# Patient Record
Sex: FEMALE | Race: BLACK OR AFRICAN AMERICAN | Employment: OTHER | URBAN - METROPOLITAN AREA
[De-identification: names, ages, dates, MRNs, and addresses within clinical notes are randomized per-mention and may not be internally consistent; named-entity substitution may affect disease eponyms.]

---

## 2017-01-11 ENCOUNTER — HOSPITAL ENCOUNTER (OUTPATIENT)
Dept: LAB | Age: 67
Discharge: HOME | End: 2017-01-11

## 2017-01-11 DIAGNOSIS — E78.1 HYPERTRIGLYCERIDEMIA WITHOUT HYPERCHOLESTEROLEMIA: ICD-10-CM

## 2017-01-11 PROCEDURE — 99001 SPECIMEN HANDLING PT-LAB: CPT | Performed by: NURSE PRACTITIONER

## 2017-01-12 LAB
CHOLEST SERPL-MCNC: 168 MG/DL (ref 100–199)
HDLC SERPL-MCNC: 46 MG/DL
INTERPRETATION, 910389: NORMAL
LDLC SERPL CALC-MCNC: 94 MG/DL (ref 0–99)
TRIGL SERPL-MCNC: 141 MG/DL (ref 0–149)
VLDLC SERPL CALC-MCNC: 28 MG/DL (ref 5–40)

## 2017-01-17 ENCOUNTER — OFFICE VISIT (OUTPATIENT)
Dept: INTERNAL MEDICINE CLINIC | Age: 67
End: 2017-01-17

## 2017-01-17 VITALS
SYSTOLIC BLOOD PRESSURE: 140 MMHG | HEIGHT: 65 IN | BODY MASS INDEX: 32.02 KG/M2 | DIASTOLIC BLOOD PRESSURE: 73 MMHG | TEMPERATURE: 98.7 F | OXYGEN SATURATION: 96 % | RESPIRATION RATE: 18 BRPM | HEART RATE: 74 BPM | WEIGHT: 192.2 LBS

## 2017-01-17 DIAGNOSIS — F32.A ANXIETY AND DEPRESSION: ICD-10-CM

## 2017-01-17 DIAGNOSIS — Z23 ENCOUNTER FOR IMMUNIZATION: ICD-10-CM

## 2017-01-17 DIAGNOSIS — Z13.820 ENCOUNTER FOR SCREENING FOR OSTEOPOROSIS: ICD-10-CM

## 2017-01-17 DIAGNOSIS — F41.9 ANXIETY AND DEPRESSION: ICD-10-CM

## 2017-01-17 DIAGNOSIS — Z12.11 COLON CANCER SCREENING: ICD-10-CM

## 2017-01-17 DIAGNOSIS — E78.1 HYPERTRIGLYCERIDEMIA WITHOUT HYPERCHOLESTEROLEMIA: Primary | ICD-10-CM

## 2017-01-17 DIAGNOSIS — I10 ESSENTIAL HYPERTENSION WITH GOAL BLOOD PRESSURE LESS THAN 140/90: ICD-10-CM

## 2017-01-17 RX ORDER — ALPRAZOLAM 1 MG/1
1 TABLET ORAL
Qty: 45 TAB | Refills: 0 | Status: SHIPPED | OUTPATIENT
Start: 2017-01-17 | End: 2017-02-17 | Stop reason: SDUPTHER

## 2017-01-17 NOTE — PROGRESS NOTES
Pt is here for   Chief Complaint   Patient presents with    Cholesterol Problem     follow up. .. pt states she had the labs done last tuesday and would like to go over the results    Medication Refill     orders pending    Referral Request     pt states she's been going to [facility] on [facility] and they are closed      Pt denies pain at this time. ..     1. Have you been to the ER, urgent care clinic since your last visit? Hospitalized since your last visit? No    2. Have you seen or consulted any other health care providers outside of the [address] [place] [address] since your last visit? Include any pap smears or colon screening.  No

## 2017-01-17 NOTE — PROGRESS NOTES
Maria Isabel Meredith is a 73 y.o. female and presents with Cholesterol Problem (follow up. .. pt states she had the labs done last tuesday and would like to go over the results); Medication Refill (orders pending); and Referral Request (pt states she's been going to the [facility] on [facility] and they are closed )    Subjective:  Pt presents to f/u elevated triglycerides. Changed diet as suggested last OV, eating more salads and baked food. Avoiding eggs and fried chicken. Still taking meds daily as prescribed. Also needs med refill of Xanax, went to Dr. Leon Sevilla office twice last week, but close without notice. Usually takes BID with Zoloft, but trying to space out to make meds last. Noticed hand shakes since. Very frustrated and feeling down since provider left, needs new referral for MH. Hypertension Review:  The patient has hypertension  Diet and Lifestyle: generally does try to follow a  low sodium diet, exercises sporadically   Home BP Monitoring: is not measured at home. Pertinent ROS: taking medications as instructed, no medication side effects noted. No TIA's, chest pain on exertion, dyspnea on exertion, or swelling of ankles.    BP Readings from Last 3 Encounters:   01/17/17 140/73   11/15/16 126/71   10/18/16 136/79     Review of Systems  Constitutional: negative for fevers, chills, anorexia and weight loss  Respiratory:  negative for hemoptysis, dyspnea, and wheezing  CV:   negative for chest pain, palpitations, and lower extremity edema  GI:   negative for nausea, vomiting, diarrhea, abdominal pain, and melena  Endo:               negative for polyuria,polydipsia,polyphagia, and heat intolerance  Genitourinary: negative for frequency, urgency, dysuria, retention, and hematuria  Integument:  negative for rash, ulcerations, and pruritus  Hematologic:  negative for easy bruising and bleeding  Musculoskel: negative for arthralgias, muscle weakness,and joint pain/swelling  Neurological:  negative for headaches, dizziness, vertigo,and memory/gait problems  Behavl/Psych: negative for feelings of suicide, and mood changes    Past Medical History   Diagnosis Date    Anxiety     Arthritis     Hypertension     Psychiatric disorder      History reviewed. No pertinent past surgical history. Social History     Social History    Marital status:      Spouse name: N/A    Number of children: N/A    Years of education: N/A     Social History Main Topics    Smoking status: Current Every Day Smoker     Packs/day: 0.50    Smokeless tobacco: None      Comment: down to 1/2 ppd from 1 ppd    Alcohol use No    Drug use: No    Sexual activity: Not Asked     Other Topics Concern    None     Social History Narrative     Family History   Problem Relation Age of Onset    No Known Problems Mother     No Known Problems Father      Current Outpatient Prescriptions   Medication Sig Dispense Refill    ALPRAZolam (XANAX) 1 mg tablet Take 1 Tab by mouth two (2) times daily as needed for Anxiety. Max Daily Amount: 2 mg. Indications: ANXIETY WITH DEPRESSION 45 Tab 0    pneumococcal 13 kyra conj dip (PREVNAR-13) 0.5 mL syrg injection 0.5 mL by IntraMUSCular route once for 1 dose. 0.5 mL 0    icosapent ethyl (VASCEPA) 1 gram capsule Take 2 Caps by mouth two (2) times a day. 120 Cap 11    amLODIPine-atorvastatin (CADUET) 10-80 mg per tablet Take 1 Tab by mouth daily. 30 Tab 11    sertraline (ZOLOFT) 25 mg tablet Take 1 Tab by mouth daily. 30 Tab 11    ipratropium (ATROVENT) 0.06 % nasal spray 2 Sprays by Both Nostrils route four (4) times daily. 15 mL 0    naproxen (NAPROSYN) 500 mg tablet Take 500 mg by mouth two (2) times daily (with meals).  aspirin delayed-release 81 mg tablet Take 1 Tab by mouth daily. 30 Tab 11    traZODone (DESYREL) 100 mg tablet Take 150 mg by mouth nightly.        Allergies   Allergen Reactions    Citalopram Other (comments)     Not effective    Prozac [Fluoxetine] Anxiety Irritable, restless       Objective:  Visit Vitals    /73 (BP 1 Location: Right arm, BP Patient Position: Sitting)    Pulse 74    Temp 98.7 °F (37.1 °C) (Oral)    Resp 18    Ht 5' 5\" (1.651 m)    Wt 192 lb 3.2 oz (87.2 kg)    SpO2 96%    BMI 31.98 kg/m2     Physical Exam:   General appearance - alert, well appearing, and in no distress. Mental status - A/O x 4, normal mood and affect. Neck -Supple ,normal CSP. FROM, non-tender. No significant adenopathy/thyromegaly. No JVD. Chest - CTA. Symmetric chest rise. No wheezing, rales or rhonchi. Heart - Normal rate, regular rhythm. Normal S1, S2. No MGR or clicks. Abdomen - Soft,non-distended. Normoactive BS in all quadrants. NT, no mass or HSM. Ext- Radial, DP pulses, 2+ bilaterally. No pedal edema, clubbing, or cyanosis. Skin-Warm and dry. No hyperpigmentation, ulcerations, or suspicious lesions. Neuro - Normal speech, no focal findings or movement disorder. Normal strength, gait, and muscle tone. Assessment/Plan:  The current medical regimen is effective;  continue present plan and medications. Dietary choices reviewed although pt doing much better. FOBT, DXA scan, and PCV 13 ordered. See below for other orders   Follow-up Disposition:  Return in about 3 months (around 4/17/2017) for 3 month f/u. ICD-10-CM ICD-9-CM    1. Hypertriglyceridemia without hypercholesterolemia E78.1 272.1    2. Anxiety and depression F41.9 300.00 ALPRAZolam (XANAX) 1 mg tablet    F32.9 311 REFERRAL TO PSYCHIATRY   3. Encounter for immunization Z23 V03.89 pneumococcal 13 kyra conj dip (PREVNAR-13) 0.5 mL syrg injection   4. Colon cancer screening Z12.11 V76.51 OCCULT BLOOD, IMMUNOASSAY (FIT)   5. Encounter for screening for osteoporosis  Z13.820 V82.81 DEXA BONE DENSITY STUDY AXIAL   6.  Essential hypertension with goal blood pressure less than 140/90 I10 401.9      Orders Placed This Encounter    DEXA BONE DENSITY STUDY AXIAL     Standing Status:   Future Standing Expiration Date:   2018     Order Specific Question:   Reason for Exam     Answer:   osteopenia, osteoporosis screening.  OCCULT BLOOD, IMMUNOASSAY (FIT)    REFERRAL TO PSYCHIATRY     Referral Priority:   Routine     Referral Type:   Behavioral Health     Referral Reason:   Specialty Services Required     Referred to Provider:   MD Laura Soto 1 mg tablet     Sig: Take 1 Tab by mouth two (2) times daily as needed for Anxiety. Max Daily Amount: 2 mg. Indications: ANXIETY WITH DEPRESSION     Dispense:  45 Tab     Refill:  0    pneumococcal 13 kyra conj dip (PREVNAR-13) 0.5 mL syrg injection     Si.5 mL by IntraMUSCular route once for 1 dose. Dispense:  0.5 mL     Refill:  0       Arabella Lieberman expressed understanding of plan. An After Visit Summary was offered/printed and given to the patient.

## 2017-01-17 NOTE — PATIENT INSTRUCTIONS
Pneumococcal Polysaccharide Vaccine: Care Instructions  Your Care Instructions  The pneumococcal polysaccharide vaccine (PPSV) can prevent some of the serious complications of pneumonia. This includes infection in the bloodstream (bacteremia) or throughout the body (septicemia). PPSV is recommended for people ages 72 years and older. People ages 3 to 59 who have a long-term illness should also get the vaccine. This includes people with diabetes, heart disease, liver disease, or lung disease. PPSV can also help people who have a weakened immune system. This includes cancer patients and people who don't have a spleen. The immune system helps your body fight infection and other illnesses. PPSV is given as a shot. It's usually given in the arm. Healthy older adults get the shot once. Other people may need to have a second dose. The shot may cause pain and redness at the site. It may also cause a mild fever for a short time. Follow-up care is a key part of your treatment and safety. Be sure to make and go to all appointments, and call your doctor if you are having problems. It's also a good idea to know your test results and keep a list of the medicines you take. How can you care for yourself at home? · Take an over-the-counter pain medicine, such as acetaminophen (Tylenol), ibuprofen (Advil, Motrin), or naproxen (Aleve), if your arm is sore after the shot. Be safe with medicines. Read and follow all instructions on the label. · Give acetaminophen (Tylenol) or ibuprofen (Advil, Motrin) to your child for pain or fussiness after the shot. Read and follow all instructions on the label. Do not give aspirin to anyone younger than 20. It has been linked to Reye syndrome, a serious illness. · Put ice or a cold pack on the sore area for 10 to 20 minutes at a time. Put a thin cloth between the ice and your skin. When should you call for help? Call 911 anytime you think you may need emergency care.  For example, call if:  · You have severe problems breathing or swallowing. · You have a seizure. Call your doctor now or seek immediate medical care if:  · You get hives. · You have a high fever. · You have any unusual reaction after getting the shot. Watch closely for changes in your health, and be sure to contact your doctor if you have any problems. Where can you learn more? Go to http://shraddha-giles.info/. Enter T225 in the search box to learn more about \"Pneumococcal Polysaccharide Vaccine: Care Instructions. \"  Current as of: February 9, 2016  Content Version: 11.1  © 3709-4858 [employer]. Care instructions adapted under license by [employer] (which disclaims liability or warranty for this information). If you have questions about a medical condition or this instruction, always ask your healthcare professional. [address] any warranty or liability for your use of this information. Mediterranean Diet: Care Instructions  Your Care Instructions  The Mediterranean diet features foods eaten in [place], [place], [place] and [place], and other countries that border the [facility]. It emphasizes eating a diet rich in fruits, vegetables, nuts, and high-fiber grains, and limits meat, cheese, and sweets. The Mediterranean diet may:  · Prevent heart disease and lower the risk of a heart attack or stroke. · Prevent type 2 diabetes. · Prevent Alzheimer's disease and other dementia. · Prevent depression. · Prevent Parkinson's disease. This diet contains more fat than other heart-healthy diets. But the fats are mainly from nuts, unsaturated oils, such as fish oils, olive oil, and certain nut or seed oils (such as canola, soybean, or flaxseed oil). These types of oils may help protect the heart and blood vessels. Follow-up care is a key part of your treatment and safety.  Be sure to make and go to all appointments, and call your doctor if you are having problems. It's also a good idea to know your test results and keep a list of the medicines you take. How can you care for yourself at home? What to eat  · Eat a variety of fruits and vegetables each day, such as grapes, blueberries, tomatoes, broccoli, peppers, figs, olives, spinach, eggplant, beans, lentils, and chickpeas. · Eat a variety of whole-grain foods each day, such as oats, brown rice, and whole wheat bread, pasta, and couscous. · Eat fish at least 2 times a week. Try tuna, salmon, mackerel, lake trout, herring, or sardines. · Eat moderate amounts of low-fat dairy products each day or weekly, such as milk, cheese, or yogurt. · Eat moderate amounts of poultry and eggs every 2 days or weekly. · Choose healthy (unsaturated) fats, such as nuts, olive oil and certain nut or seed oils like canola, soybean, and flaxseed. · Limit unhealthy (saturated) fats, such as butter, palm oil, and coconut oil. And limit fats found in animal products, such as meat and dairy products made with whole milk. Try to eat red meat only a few times a month in very small amounts. · Limit sweets and desserts to only a few times a week. This includes sugar-sweetened drinks like soda. The Mediterranean diet may also include red wine with your meal--1 glass each day for women and up to 2 glasses a day for men. Tips for changing your diet  · Dip bread in a mix of olive oil and fresh herbs instead of using butter. · Add avocado slices to your sandwich instead of luna. · Have fish for lunch or dinner instead of red meat. Brush the fish with olive oil, and broil or grill it. · Sprinkle your salad with seeds or nuts instead of cheese. · Cook with olive or canola oil instead of butter or oils that are high in saturated fat. · Switch from 2% milk or whole milk to 1% or fat-free milk.   · Dip raw vegetables in a vinaigrette dressing or hummus instead of dips made from mayonnaise or sour cream.  · Have a piece of fruit for dessert instead of a piece of cake. Try baked apples, or have some dried fruit. Part of the Mediterranean diet is being active. Get at least 30 minutes of exercise on most days of the week. Walking is a good choice. You also may want to do other activities, such as running, swimming, cycling, or playing tennis or team sports. Where can you learn more? Go to http://shraddha-giles.info/. Enter O407 in the search box to learn more about \"Mediterranean Diet: Care Instructions. \"  Current as of: July 26, 2016  Content Version: 11.1  © 6439-7315 [employer]. Care instructions adapted under license by [employer] (which disclaims liability or warranty for this information). If you have questions about a medical condition or this instruction, always ask your healthcare professional. [address] any warranty or liability for your use of this information. Dual-Energy X-Ray Absorptiometry (DXA) Test: About This Test  What is it? Dual-energy X-ray absorptiometry (DXA) is a test that uses two different X-ray beams to check bone thickness (density) in your spine and hip. This information is used to estimate the strength of your bones. Why is this test done? A DXA test is done to check for bone thinning and weakness, which can make it easier for you to break a bone. It is often done for:  · People who are at risk for osteoporosis, including:  ¨ Women who are age 72 and older. ¨ Older men. ¨ People who take some medications, such as corticosteroids. ¨ People who have certain medical conditions, such as hyperparathyroidism. · People who have osteoporosis, to see how well treatment is working. What happens before the test?  · Women who are pregnant should not have this test. Let your doctor know if you are or might be pregnant.   · You may be able to leave your clothes on for the test, but you will remove any metal buttons or sushil for the test.  What happens during the test?  · You will lie down on your back on a padded table. · You may need to lie with your legs straight or with your lower legs resting on a platform built into the table. · The machine will scan your bones and measure the amount of radiation they absorb. During this test you are exposed to a very low dose of radiation. How long does the test take? · The test will take about 20 minutes. What happens after the test?  · You will probably be able to go home right away. · You can go back to your usual activities right away. Follow-up care is a key part of your treatment and safety. Be sure to make and go to all appointments, and call your doctor if you are having problems. It's also a good idea to keep a list of the medicines you take. Ask your doctor when you can expect to have your test results. Where can you learn more? Go to http://shraddha-giles.info/. Enter L817 in the search box to learn more about \"Dual-Energy X-Ray Absorptiometry (DXA) Test: About This Test.\"  Current as of: August 4, 2016  Content Version: 11.1  © 3069-0138 [employer]. Care instructions adapted under license by [employer] (which disclaims liability or warranty for this information). If you have questions about a medical condition or this instruction, always ask your healthcare professional. [address] any warranty or liability for your use of this information.

## 2017-01-17 NOTE — MR AVS SNAPSHOT
Visit Information Date & Time Provider Department Dept. Phone Encounter #  
 1/17/2017  9:00 AM Hang Piper NP [address] [facility] [phone] [id number] Follow-up Instructions Return in about 3 months (around 4/17/2017) for 3 month f/u. Upcoming Health Maintenance Date Due  
 BREAST CANCER SCRN MAMMOGRAM 6/1/2017 MEDICARE YEARLY EXAM 6/2/2017 GLAUCOMA SCREENING Q2Y 7/1/2017 Pneumococcal 65+ Low/Medium Risk (2 of 2 - PPSV23) 1/17/2018 FOBT Q 1 YEAR AGE 50-75 1/17/2018 DTaP/Tdap/Td series (2 - Td) 6/26/2025 Allergies as of 1/17/2017  Review Complete On: 1/17/2017 By: Jay Hartman. RJ Rosales Severity Noted Reaction Type Reactions Citalopram  04/20/2016   Intolerance Other (comments) Not effective Prozac [Fluoxetine]  04/20/2016   Intolerance Anxiety Irritable, restless Current Immunizations  Never Reviewed No immunizations on file. Not reviewed this visit You Were Diagnosed With   
  
 Codes Comments Hypertriglyceridemia without hypercholesterolemia    -  Primary ICD-10-CM: E78.1 ICD-9-CM: 272.1 Anxiety and depression     ICD-10-CM: F41.9, F32.9 ICD-9-CM: 300.00, 311 Encounter for immunization     ICD-10-CM: H50 ICD-9-CM: V03.89 Colon cancer screening     ICD-10-CM: Z12.11 ICD-9-CM: V76.51 Encounter for screening for osteoporosis     ICD-10-CM: Z13.820 ICD-9-CM: V82.81 Essential hypertension with goal blood pressure less than 140/90     ICD-10-CM: I10 
ICD-9-CM: 401.9 Vitals BP Pulse Temp Resp Height(growth percentile) Weight(growth percentile) 140/73 (BP 1 Location: Right arm, BP Patient Position: Sitting) 74 98.7 °F (37.1 °C) (Oral) 18 5' 5\" (1.651 m) 192 lb 3.2 oz (87.2 kg) SpO2 BMI OB Status Smoking Status 96% 31.98 kg/m2 Postmenopausal Current Every Day Smoker Vitals History BMI and BSA Data Body Mass Index Body Surface Area  31.98 kg/m 2 2 m 2  
  
 Preferred Pharmacy Pharmacy Name Phone [facility] [email or web address] [facility], [address] [phone] Your Updated Medication List  
  
   
This list is accurate as of: 17 10:01 AM.  Always use your most recent med list.  
  
  
  
  
 ALPRAZolam 1 mg tablet Commonly known as:  Melene Reels Take 1 Tab by mouth two (2) times daily as needed for Anxiety. Max Daily Amount: 2 mg. Indications: ANXIETY WITH DEPRESSION  
  
 amLODIPine-atorvastatin 10-80 mg per tablet Commonly known as:  CADUET Take 1 Tab by mouth daily. aspirin delayed-release 81 mg tablet Take 1 Tab by mouth daily. icosapent ethyl 1 gram capsule Commonly known as:  VASCEPA Take 2 Caps by mouth two (2) times a day. ipratropium 0.06 % nasal spray Commonly known as:  ATROVENT  
2 Sprays by Both Nostrils route four (4) times daily. naproxen 500 mg tablet Commonly known as:  NAPROSYN Take 500 mg by mouth two (2) times daily (with meals). pneumococcal 13 kyra conj dip 0.5 mL Syrg injection Commonly known as:  PREVNAR-13  
0.5 mL by IntraMUSCular route once for 1 dose. sertraline 25 mg tablet Commonly known as:  ZOLOFT Take 1 Tab by mouth daily. traZODone 100 mg tablet Commonly known as:  Ankur Merary Take 150 mg by mouth nightly. Prescriptions Printed Refills ALPRAZolam (XANAX) 1 mg tablet 0 Sig: Take 1 Tab by mouth two (2) times daily as needed for Anxiety. Max Daily Amount: 2 mg. Indications: ANXIETY WITH DEPRESSION Class: Print Route: Oral  
  
Prescriptions Sent to Pharmacy Refills  
 pneumococcal 13 kyra conj dip (PREVNAR-13) 0.5 mL syrg injection 0 Si.5 mL by IntraMUSCular route once for 1 dose. Class: Normal  
 Pharmacy: [email or web address] [place], [address] Ph #: [phone] Route: IntraMUSCular We Performed the Following OCCULT BLOOD, IMMUNOASSAY (FIT) [id number] CPT(R)] REFERRAL TO PSYCHIATRY [REF91 Custom] Comments:  
 Please evaluate patient for YULY and depression. Follow-up Instructions Return in about 3 months (around 4/17/2017) for 3 month f/u. To-Do List   
 01/17/2017 Imaging:  DEXA BONE DENSITY STUDY AXIAL Referral Information Referral ID Referred By Referred To  
  
 [id number] Siddhartha Mckeon MD   
   [address] [facility] [address], [address] Phone: [phone] Fax: [phone] Visits Status Start Date End Date 1 New Request 1/17/17 1/17/18 If your referral has a status of pending review or denied, additional information will be sent to support the outcome of this decision. Patient Instructions Pneumococcal Polysaccharide Vaccine: Care Instructions Your Care Instructions The pneumococcal polysaccharide vaccine (PPSV) can prevent some of the serious complications of pneumonia. This includes infection in the bloodstream (bacteremia) or throughout the body (septicemia). PPSV is recommended for people ages 72 years and older. People ages 3 to 59 who have a long-term illness should also get the vaccine. This includes people with diabetes, heart disease, liver disease, or lung disease. PPSV can also help people who have a weakened immune system. This includes cancer patients and people who don't have a spleen. The immune system helps your body fight infection and other illnesses. PPSV is given as a shot. It's usually given in the arm. Healthy older adults get the shot once. Other people may need to have a second dose. The shot may cause pain and redness at the site. It may also cause a mild fever for a short time. Follow-up care is a key part of your treatment and safety. Be sure to make and go to all appointments, and call your doctor if you are having problems. It's also a good idea to know your test results and keep a list of the medicines you take. How can you care for yourself at home? · Take an over-the-counter pain medicine, such as acetaminophen (Tylenol), ibuprofen (Advil, Motrin), or naproxen (Aleve), if your arm is sore after the shot. Be safe with medicines. Read and follow all instructions on the label. · Give acetaminophen (Tylenol) or ibuprofen (Advil, Motrin) to your child for pain or fussiness after the shot. Read and follow all instructions on the label. Do not give aspirin to anyone younger than 20. It has been linked to Reye syndrome, a serious illness. · Put ice or a cold pack on the sore area for 10 to 20 minutes at a time. Put a thin cloth between the ice and your skin. When should you call for help? Call 911 anytime you think you may need emergency care. For example, call if: 
· You have severe problems breathing or swallowing. · You have a seizure. Call your doctor now or seek immediate medical care if: 
· You get hives. · You have a high fever. · You have any unusual reaction after getting the shot. Watch closely for changes in your health, and be sure to contact your doctor if you have any problems. Where can you learn more? Go to http://shraddha-giles.info/. Enter T225 in the search box to learn more about \"Pneumococcal Polysaccharide Vaccine: Care Instructions. \" Current as of: February 9, 2016 Content Version: 11.1 © 0949-1133 [employer]Senas[employer]. Care instructions adapted under license by [employer] (which disclaims liability or warranty for this information). If you have questions about a medical condition or this instruction, always ask your healthcare professional. [place] [address] any warranty or liability for your use of this information. Mediterranean Diet: Care Instructions Your Care Instructions The Mediterranean diet features foods eaten in [place], [place], [place] and [place], and other countries that border the [facility]. It emphasizes eating a diet rich in fruits, vegetables, nuts, and high-fiber grains, and limits meat, cheese, and sweets. The Mediterranean diet may: · Prevent heart disease and lower the risk of a heart attack or stroke. · Prevent type 2 diabetes. · Prevent Alzheimer's disease and other dementia. · Prevent depression. · Prevent Parkinson's disease. This diet contains more fat than other heart-healthy diets. But the fats are mainly from nuts, unsaturated oils, such as fish oils, olive oil, and certain nut or seed oils (such as canola, soybean, or flaxseed oil). These types of oils may help protect the heart and blood vessels. Follow-up care is a key part of your treatment and safety. Be sure to make and go to all appointments, and call your doctor if you are having problems. It's also a good idea to know your test results and keep a list of the medicines you take. How can you care for yourself at home? What to eat · Eat a variety of fruits and vegetables each day, such as grapes, blueberries, tomatoes, broccoli, peppers, figs, olives, spinach, eggplant, beans, lentils, and chickpeas. · Eat a variety of whole-grain foods each day, such as oats, brown rice, and whole wheat bread, pasta, and couscous. · Eat fish at least 2 times a week. Try tuna, salmon, mackerel, lake trout, herring, or sardines. · Eat moderate amounts of low-fat dairy products each day or weekly, such as milk, cheese, or yogurt. · Eat moderate amounts of poultry and eggs every 2 days or weekly. · Choose healthy (unsaturated) fats, such as nuts, olive oil and certain nut or seed oils like canola, soybean, and flaxseed. · Limit unhealthy (saturated) fats, such as butter, palm oil, and coconut oil. And limit fats found in animal products, such as meat and dairy products made with whole milk. Try to eat red meat only a few times a month in very small amounts. · Limit sweets and desserts to only a few times a week.  This includes sugar-sweetened drinks like soda. The Mediterranean diet may also include red wine with your meal1 glass each day for women and up to 2 glasses a day for men. Tips for changing your diet · Dip bread in a mix of olive oil and fresh herbs instead of using butter. · Add avocado slices to your sandwich instead of luna. · Have fish for lunch or dinner instead of red meat. Brush the fish with olive oil, and broil or grill it. · Sprinkle your salad with seeds or nuts instead of cheese. · Cook with olive or canola oil instead of butter or oils that are high in saturated fat. · Switch from 2% milk or whole milk to 1% or fat-free milk. · Dip raw vegetables in a vinaigrette dressing or hummus instead of dips made from mayonnaise or sour cream. 
· Have a piece of fruit for dessert instead of a piece of cake. Try baked apples, or have some dried fruit. Part of the Mediterranean diet is being active. Get at least 30 minutes of exercise on most days of the week. Walking is a good choice. You also may want to do other activities, such as running, swimming, cycling, or playing tennis or team sports. Where can you learn more? Go to http://shraddha-giles.info/. Enter O407 in the search box to learn more about \"Mediterranean Diet: Care Instructions. \" Current as of: July 26, 2016 Content Version: 11.1 © 2584-9895 Whotever. Care instructions adapted under license by [employer] (which disclaims liability or warranty for this information). If you have questions about a medical condition or this instruction, always ask your healthcare professional. [place] [address] any warranty or liability for your use of this information. Dual-Energy X-Ray Absorptiometry (DXA) Test: About This Test 
What is it? Dual-energy X-ray absorptiometry (DXA) is a test that uses two different X-ray beams to check bone thickness (density) in your spine and hip.  This information is used to estimate the strength of your bones. Why is this test done? A DXA test is done to check for bone thinning and weakness, which can make it easier for you to break a bone. It is often done for: 
· People who are at risk for osteoporosis, including: ¨ Women who are age 72 and older. ¨ Older men. ¨ People who take some medications, such as corticosteroids. ¨ People who have certain medical conditions, such as hyperparathyroidism. · People who have osteoporosis, to see how well treatment is working. What happens before the test? 
· Women who are pregnant should not have this test. Let your doctor know if you are or might be pregnant. · You may be able to leave your clothes on for the test, but you will remove any metal buttons or sushil for the test. 
What happens during the test? 
· You will lie down on your back on a padded table. · You may need to lie with your legs straight or with your lower legs resting on a platform built into the table. · The machine will scan your bones and measure the amount of radiation they absorb. During this test you are exposed to a very low dose of radiation. How long does the test take? · The test will take about 20 minutes. What happens after the test? 
· You will probably be able to go home right away. · You can go back to your usual activities right away. Follow-up care is a key part of your treatment and safety. Be sure to make and go to all appointments, and call your doctor if you are having problems. It's also a good idea to keep a list of the medicines you take. Ask your doctor when you can expect to have your test results. Where can you learn more? Go to http://shraddha-giles.info/. Enter L643 in the search box to learn more about \"Dual-Energy X-Ray Absorptiometry (DXA) Test: About This Test.\" Current as of: August 4, 2016 Content Version: 11.1 © 2606-0348 [employer].  Care instructions adapted under license by [address] (which disclaims liability or warranty for this information). If you have questions about a medical condition or this instruction, always ask your healthcare professional. [address] any warranty or liability for your use of this information. Introducing \[facility] [place] [facility]\"" & HEALTH SERVICES! Fariha Anderson introduces Medialets patient portal. Now you can access parts of your medical record, email your doctor's office, and request medication refills online. 1. In your internet browser, go to [email or web address] 2. Click on the First Time User? Click Here link in the Sign In box. You will see the New Member Sign Up page. 3. Enter your Medialets Access Code exactly as it appears below. You will not need to use this code after youve completed the sign-up process. If you do not sign up before the expiration date, you must request a new code. · Medialets Access Code: [email or web address] Expires: 2/13/2017 10:59 AM 
 
4. Enter the last four digits of your Social Security Number (xxxx) and Date of Birth (mm/dd/yyyy) as indicated and click Submit. You will be taken to the next sign-up page. 5. Create a Medialets ID. This will be your Medialets login ID and cannot be changed, so think of one that is secure and easy to remember. 6. Create a Medialets password. You can change your password at any time. 7. Enter your Password Reset Question and Answer. This can be used at a later time if you forget your password. 8. Enter your e-mail address. You will receive e-mail notification when new information is available in [address] 9. Click Sign Up. You can now view and download portions of your medical record. 10. Click the Download Summary menu link to download a portable copy of your medical information. If you have questions, please visit the Frequently Asked Questions section of the Medialets website.  Remember, Medialets is NOT to be used for urgent needs. For medical emergencies, dial 911. Now available from your iPhone and Android! Please provide this summary of care documentation to your next provider. Your primary care clinician is listed as CHRISTEN Portillo. If you have any questions after today's visit, please call [phone].

## 2017-02-08 ENCOUNTER — TELEPHONE (OUTPATIENT)
Dept: INTERNAL MEDICINE CLINIC | Age: 67
End: 2017-02-08

## 2017-02-08 NOTE — TELEPHONE ENCOUNTER
PT STATES  She is scheduled for bone density test  2/15/17 @ 10:30 a m  @ [facility]. She has [employer] ins.  Pt #  [phone]

## 2017-02-14 ENCOUNTER — TELEPHONE (OUTPATIENT)
Dept: INTERNAL MEDICINE CLINIC | Age: 67
End: 2017-02-14

## 2017-02-14 NOTE — TELEPHONE ENCOUNTER
Ashley Macias @ [employer] states the dx code for the bone density is not passing with medicare. She could not give me  another code. You have to put another one in.  The pt is scheduled for tomorrow @ 19:89XT. Rosalee's # [phone]

## 2017-02-15 ENCOUNTER — TELEPHONE (OUTPATIENT)
Dept: INTERNAL MEDICINE CLINIC | Age: 67
End: 2017-02-15

## 2017-02-15 DIAGNOSIS — Z91.89 AT RISK FOR OSTEOPOROSIS/OSTEOPENIA: Primary | ICD-10-CM

## 2017-02-15 NOTE — TELEPHONE ENCOUNTER
Gianfranco See states you have to put another order in   with the selected dx code please so she can reschedule the dexa scan

## 2017-02-15 NOTE — TELEPHONE ENCOUNTER
They can try Z91.89, at risk for osteopenia/osteoporosis instead. Just as an FYI, jus, via the ABN tab MADE me select an alternate diagnosis and the one chosen was from the list of APPROVED diagnosis for DEXA scan.

## 2017-02-17 ENCOUNTER — OFFICE VISIT (OUTPATIENT)
Dept: INTERNAL MEDICINE CLINIC | Age: 67
End: 2017-02-17

## 2017-02-17 VITALS
RESPIRATION RATE: 18 BRPM | BODY MASS INDEX: 31.16 KG/M2 | DIASTOLIC BLOOD PRESSURE: 65 MMHG | HEIGHT: 65 IN | OXYGEN SATURATION: 97 % | TEMPERATURE: 98.3 F | WEIGHT: 187 LBS | SYSTOLIC BLOOD PRESSURE: 130 MMHG | HEART RATE: 74 BPM

## 2017-02-17 DIAGNOSIS — E78.1 HYPERTRIGLYCERIDEMIA WITHOUT HYPERCHOLESTEROLEMIA: ICD-10-CM

## 2017-02-17 DIAGNOSIS — F32.A ANXIETY AND DEPRESSION: Primary | ICD-10-CM

## 2017-02-17 DIAGNOSIS — F41.9 ANXIETY AND DEPRESSION: Primary | ICD-10-CM

## 2017-02-17 LAB
CHOLEST SERPL-MCNC: 183 MG/DL
HDLC SERPL-MCNC: 60 MG/DL
LDL CHOLESTEROL POC: NORMAL
NON-HDL GOAL (POC): 123
TCHOL/HDL RATIO (POC): 3.1
TRIGL SERPL-MCNC: 540 MG/DL

## 2017-02-17 RX ORDER — ALPRAZOLAM 1 MG/1
1 TABLET ORAL
Qty: 45 TAB | Refills: 1 | Status: SHIPPED | OUTPATIENT
Start: 2017-02-17 | End: 2017-05-15 | Stop reason: SDUPTHER

## 2017-02-17 NOTE — PROGRESS NOTES
Lu Dao is a 78 y.o. female and presents with Medication Refill (orders pending)    Subjective:  Patient is seen for followup of anxiety and depression. Treatment includes Zoloft, Trazodone, and Xanax and no other therapies. Ongoing symptoms include depressed mood, insomnia, psychomotor agitation, hand shaking, fatigue, difficulty concentrating and hopelessness. Concerned for 's condition with Alzheimer's, recently became aggressive, seen at [facility] psych x 6-7 days. Nervous now since unsure if he will become aggressive, wishing to harm anyone again. The patient denies recurrent thoughts of death and suicidal thoughts without plan. The patient experiences the following side effects from the treatment: none. PHQ 2 / 9, over the last two weeks 2/17/2017   Little interest or pleasure in doing things Not at all   Feeling down, depressed or hopeless Several days   Total Score PHQ 2 1      Review of Systems  Constitutional: negative for fevers, chills, anorexia and weight loss  Respiratory:  negative for hemoptysis, dyspnea, and wheezing  CV:   + lipids. negative for chest pain, palpitations, and lower extremity edema  GI:   negative for nausea, vomiting, diarrhea, abdominal pain, and melena  Endo:               negative for polyuria,polydipsia,polyphagia, and heat intolerance  Genitourinary: negative for frequency, urgency, dysuria, retention, and hematuria  Integument:  negative for rash, ulcerations, and pruritus  Hematologic:  negative for easy bruising and bleeding  Musculoskel: negative for arthralgias, muscle weakness,and joint pain/swelling  Neurological:  negative for headaches, dizziness, vertigo,and memory/gait problems  Behavl/Psych: negative for feelings of suicide, and mood changes    Past Medical History   Diagnosis Date    Anxiety     Arthritis     Hypertension     Psychiatric disorder      History reviewed. No pertinent past surgical history.   Social History     Social History    Marital status:      Spouse name: N/A    Number of children: N/A    Years of education: N/A     Social History Main Topics    Smoking status: Current Every Day Smoker     Packs/day: 0.50    Smokeless tobacco: None      Comment: down to 1/2 ppd from 1 ppd    Alcohol use No    Drug use: No    Sexual activity: Not Asked     Other Topics Concern    None     Social History Narrative     Family History   Problem Relation Age of Onset    No Known Problems Mother     No Known Problems Father      Current Outpatient Prescriptions   Medication Sig Dispense Refill    ALPRAZolam (XANAX) 1 mg tablet Take 1 Tab by mouth two (2) times daily as needed for Anxiety. Max Daily Amount: 2 mg. Indications: ANXIETY WITH DEPRESSION 45 Tab 1    icosapent ethyl (VASCEPA) 1 gram capsule Take 2 Caps by mouth two (2) times a day. 120 Cap 11    amLODIPine-atorvastatin (CADUET) 10-80 mg per tablet Take 1 Tab by mouth daily. 30 Tab 11    sertraline (ZOLOFT) 25 mg tablet Take 1 Tab by mouth daily. 30 Tab 11    ipratropium (ATROVENT) 0.06 % nasal spray 2 Sprays by Both Nostrils route four (4) times daily. 15 mL 0    naproxen (NAPROSYN) 500 mg tablet Take 500 mg by mouth two (2) times daily (with meals).  aspirin delayed-release 81 mg tablet Take 1 Tab by mouth daily. 30 Tab 11    traZODone (DESYREL) 100 mg tablet Take 150 mg by mouth nightly. Allergies   Allergen Reactions    Citalopram Other (comments)     Not effective    Prozac [Fluoxetine] Anxiety     Irritable, restless       Objective:  Visit Vitals    /65 (BP 1 Location: Right arm, BP Patient Position: Sitting)    Pulse 74    Temp 98.3 °F (36.8 °C) (Oral)    Resp 18    Ht 5' 5\" (1.651 m)    Wt 187 lb (84.8 kg)    SpO2 97%    BMI 31.12 kg/m2     Physical Exam:   General appearance - alert, well appearing, and in no distress. Mental status - A/O x 4, normal mood and affect. Neck -Supple ,normal CSP. FROM, non-tender.  No significant adenopathy/thyromegaly. No JVD. Chest - CTA. Symmetric chest rise. No wheezing, rales or rhonchi. Heart - Normal rate, regular rhythm. Normal S1, S2. No MGR or clicks. Abdomen - Soft,non-distended. Normoactive BS in all quadrants. NT, no mass or HSM. Ext- Radial, DP pulses, 2+ bilaterally. No pedal edema, clubbing, or cyanosis. Skin-Warm and dry. No hyperpigmentation, ulcerations, or suspicious lesions. Neuro - Normal speech, no focal findings or movement disorder. Normal strength, gait, and muscle tone. Assessment/Plan:  Anxiety- Refilled Xanax (#45 tabs).  was reviewed while planning for pain management, no indications of drug diversion suspected. Prescription history is not suspicious for controlled substance overuse. See below for other orders   Follow-up Disposition:  Return for keep appt as scheduled for lipids. ICD-10-CM ICD-9-CM    1. Anxiety and depression F41.9 300.00 ALPRAZolam (XANAX) 1 mg tablet    F32.9 311    2. Hypertriglyceridemia without hypercholesterolemia E78.1 272.1 AMB POC LIPID PROFILE     Orders Placed This Encounter    AMB POC LIPID PROFILE    ALPRAZolam (XANAX) 1 mg tablet     Sig: Take 1 Tab by mouth two (2) times daily as needed for Anxiety. Max Daily Amount: 2 mg. Indications: ANXIETY WITH DEPRESSION     Dispense:  39 Tab     Refill:  1       Adithya Leung expressed understanding of plan. An After Visit Summary was offered/printed and given to the patient.

## 2017-02-17 NOTE — PROGRESS NOTES
1. Have you been to the ER, urgent care clinic since your last visit? Hospitalized since your last visit? No    2. Have you seen or consulted any other health care providers outside of the [address] [place] [address] since your last visit? Include any pap smears or colon screening. No     Pt is here for   Chief Complaint   Patient presents with    Medication Refill     orders pending     Pt denies pain at this time. ...

## 2017-02-17 NOTE — MR AVS SNAPSHOT
Visit Information Date & Time Provider Department Dept. Phone Encounter #  
 2/17/2017 10:40 AM Angelo Hermosillo NP [address] [facility] [phone] [id number] Follow-up Instructions Return for keep appt as scheduled for lipids. Your Appointments 4/10/2017  9:30 AM  
New Patient with Kurt Carrera MD  
[facility] Appt Note: new pt for anxiety and depression referred by Angelo Hermosillo NP  
 [address] [facility] [address] [facility] [address]  
  
   
 [address] [facility] [address] [address] 4/17/2017  9:00 AM  
ROUTINE CARE with Angelo Hermosillo NP  
[address] [facility]) Appt Note: 3 month fu  
 [address] [address]  
[phone]  
  
   
 [address] [facility] Upcoming Health Maintenance Date Due  
 BREAST CANCER SCRN MAMMOGRAM 6/1/2017 MEDICARE YEARLY EXAM 6/2/2017 GLAUCOMA SCREENING Q2Y 7/1/2017 Pneumococcal 65+ Low/Medium Risk (2 of 2 - PPSV23) 1/17/2018 FOBT Q 1 YEAR AGE 50-75 1/17/2018 DTaP/Tdap/Td series (2 - Td) 6/26/2025 Allergies as of 2/17/2017  Review Complete On: 2/17/2017 By: Angelo Hermosillo NP Severity Noted Reaction Type Reactions Citalopram  04/20/2016   Intolerance Other (comments) Not effective Prozac [Fluoxetine]  04/20/2016   Intolerance Anxiety Irritable, restless Current Immunizations  Never Reviewed No immunizations on file. Not reviewed this visit You Were Diagnosed With   
  
 Codes Comments Anxiety and depression    -  Primary ICD-10-CM: F41.9, F32.9 ICD-9-CM: 300.00, 311 Hypertriglyceridemia without hypercholesterolemia     ICD-10-CM: E78.1 ICD-9-CM: 272.1 Vitals BP Pulse Temp Resp Height(growth percentile) Weight(growth percentile)  130/65 (BP 1 Location: Right arm, BP Patient Position: Sitting) 74 98.3 °F (36.8 °C) (Oral) 18 5' 5\" (1.651 m) 187 lb (84.8 kg) SpO2 BMI OB Status Smoking Status 97% 31.12 kg/m2 Postmenopausal Current Every Day Smoker Vitals History BMI and BSA Data Body Mass Index Body Surface Area  
 31.12 kg/m 2 1.97 m 2 Preferred Pharmacy Pharmacy Name Phone  [facility] [address] [address] [facility] [phone] Your Updated Medication List  
  
   
This list is accurate as of: 2/17/17 11:39 AM.  Always use your most recent med list.  
  
  
  
  
 ALPRAZolam 1 mg tablet Commonly known as:  Melene Reels Take 1 Tab by mouth two (2) times daily as needed for Anxiety. Max Daily Amount: 2 mg. Indications: ANXIETY WITH DEPRESSION  
  
 amLODIPine-atorvastatin 10-80 mg per tablet Commonly known as:  CADUET Take 1 Tab by mouth daily. aspirin delayed-release 81 mg tablet Take 1 Tab by mouth daily. icosapent ethyl 1 gram capsule Commonly known as:  VASCEPA Take 2 Caps by mouth two (2) times a day. ipratropium 0.06 % nasal spray Commonly known as:  ATROVENT  
2 Sprays by Both Nostrils route four (4) times daily. naproxen 500 mg tablet Commonly known as:  NAPROSYN Take 500 mg by mouth two (2) times daily (with meals). sertraline 25 mg tablet Commonly known as:  ZOLOFT Take 1 Tab by mouth daily. traZODone 100 mg tablet Commonly known as:  Ankur Merary Take 150 mg by mouth nightly. Prescriptions Printed Refills ALPRAZolam (XANAX) 1 mg tablet 1 Sig: Take 1 Tab by mouth two (2) times daily as needed for Anxiety. Max Daily Amount: 2 mg. Indications: ANXIETY WITH DEPRESSION Class: Print Route: Oral  
  
We Performed the Following AMB POC LIPID PROFILE [77713 CPT(R)] Follow-up Instructions Return for keep appt as scheduled for lipids. Patient Instructions Anxiety Disorder: Care Instructions Your Care Instructions Anxiety is a normal reaction to stress. Difficult situations can cause you to have symptoms such as sweaty palms and a nervous feeling. In an anxiety disorder, the symptoms are far more severe. Constant worry, muscle tension, trouble sleeping, nausea and diarrhea, and other symptoms can make normal daily activities difficult or impossible. These symptoms may occur for no reason, and they can affect your work, school, or social life. Medicines, counseling, and self-care can all help. Follow-up care is a key part of your treatment and safety. Be sure to make and go to all appointments, and call your doctor if you are having problems. It's also a good idea to know your test results and keep a list of the medicines you take. How can you care for yourself at home? · Take medicines exactly as directed. Call your doctor if you think you are having a problem with your medicine. · Go to your counseling sessions and follow-up appointments. · Recognize and accept your anxiety. Then, when you are in a situation that makes you anxious, say to yourself, \"This is not an emergency. I feel uncomfortable, but I am not in danger. I can keep going even if I feel anxious. \" · Be kind to your body: ¨ Relieve tension with exercise or a massage. ¨ Get enough rest. 
¨ Avoid alcohol, caffeine, nicotine, and illegal drugs. They can increase your anxiety level and cause sleep problems. ¨ Learn and do relaxation techniques. See below for more about these techniques. · Engage your mind. Get out and do something you enjoy. Go to a funny movie, or take a walk or hike. Plan your day. Having too much or too little to do can make you anxious. · Keep a record of your symptoms. Discuss your fears with a good friend or family member, or join a support group for people with similar problems. Talking to others sometimes relieves stress. · Get involved in social groups, or volunteer to help others.  Being alone sometimes makes things seem worse than they are. · Get at least 30 minutes of exercise on most days of the week to relieve stress. Walking is a good choice. You also may want to do other activities, such as running, swimming, cycling, or playing tennis or team sports. Relaxation techniques Do relaxation exercises 10 to 20 minutes a day. You can play soothing, relaxing music while you do them, if you wish. · Tell others in your house that you are going to do your relaxation exercises. Ask them not to disturb you. · Find a comfortable place, away from all distractions and noise. · Lie down on your back, or sit with your back straight. · Focus on your breathing. Make it slow and steady. · Breathe in through your nose. Breathe out through either your nose or mouth. · Breathe deeply, filling up the area between your navel and your rib cage. Breathe so that your belly goes up and down. · Do not hold your breath. · Breathe like this for 5 to 10 minutes. Notice the feeling of calmness throughout your whole body. As you continue to breathe slowly and deeply, relax by doing the following for another 5 to 10 minutes: · Tighten and relax each muscle group in your body. You can begin at your toes and work your way up to your head. · Imagine your muscle groups relaxing and becoming heavy. · Empty your mind of all thoughts. · Let yourself relax more and more deeply. · Become aware of the state of calmness that surrounds you. · When your relaxation time is over, you can bring yourself back to alertness by moving your fingers and toes and then your hands and feet and then stretching and moving your entire body. Sometimes people fall asleep during relaxation, but they usually wake up shortly afterward. · Always give yourself time to return to full alertness before you drive a car or do anything that might cause an accident if you are not fully alert. Never play a relaxation tape while you drive a car. When should you call for help? Call 911 anytime you think you may need emergency care. For example, call if: 
· You feel you cannot stop from hurting yourself or someone else. Keep the numbers for these national suicide hotlines: 9-978-416-TALK (5-703.697.2537) and 9-575-FCLABQY (2-624.501.6188). If you or someone you know talks about suicide or feeling hopeless, get help right away. Watch closely for changes in your health, and be sure to contact your doctor if: 
· You have anxiety or fear that affects your life. · You have symptoms of anxiety that are new or different from those you had before. Where can you learn more? Go to http://shraddha-giles.info/. Enter P754 in the search box to learn more about \"Anxiety Disorder: Care Instructions. \" Current as of: July 26, 2016 Content Version: 11.1 © 0277-5500 [employer]. Care instructions adapted under license by [employer] (which disclaims liability or warranty for this information). If you have questions about a medical condition or this instruction, always ask your healthcare professional. [address] any warranty or liability for your use of this information. Introducing [facility]! Rubén Mccloud introduces makerSQR patient portal. Now you can access parts of your medical record, email your doctor's office, and request medication refills online. 1. In your internet browser, go to [email or web address] 2. Click on the First Time User? Click Here link in the Sign In box. You will see the New Member Sign Up page. 3. Enter your makerSQR Access Code exactly as it appears below. You will not need to use this code after youve completed the sign-up process. If you do not sign up before the expiration date, you must request a new code. · makerSQR Access Code: [email or web address] Expires: 5/15/2017 11:16 AM 
 
 4. Enter the last four digits of your Social Security Number (xxxx) and Date of Birth (mm/dd/yyyy) as indicated and click Submit. You will be taken to the next sign-up page. 5. Create a Algramo ID. This will be your Algramo login ID and cannot be changed, so think of one that is secure and easy to remember. 6. Create a Algramo password. You can change your password at any time. 7. Enter your Password Reset Question and Answer. This can be used at a later time if you forget your password. 8. Enter your e-mail address. You will receive e-mail notification when new information is available in [address] 9. Click Sign Up. You can now view and download portions of your medical record. 10. Click the Download Summary menu link to download a portable copy of your medical information. If you have questions, please visit the Frequently Asked Questions section of the Algramo website. Remember, Algramo is NOT to be used for urgent needs. For medical emergencies, dial 911. Now available from your iPhone and Android! Please provide this summary of care documentation to your next provider. Your primary care clinician is listed as CHRISTEN Samson. If you have any questions after today's visit, please call [phone].

## 2017-02-17 NOTE — PATIENT INSTRUCTIONS

## 2017-03-02 DIAGNOSIS — Z12.39 SCREENING FOR MALIGNANT NEOPLASM OF BREAST: Primary | ICD-10-CM

## 2017-04-10 ENCOUNTER — OFFICE VISIT (OUTPATIENT)
Dept: BEHAVIORAL/MENTAL HEALTH CLINIC | Age: 67
End: 2017-04-10

## 2017-04-10 VITALS
HEIGHT: 65 IN | WEIGHT: 190 LBS | OXYGEN SATURATION: 98 % | HEART RATE: 71 BPM | DIASTOLIC BLOOD PRESSURE: 84 MMHG | SYSTOLIC BLOOD PRESSURE: 138 MMHG | BODY MASS INDEX: 31.65 KG/M2

## 2017-04-10 DIAGNOSIS — F41.8 DEPRESSION WITH ANXIETY: Primary | ICD-10-CM

## 2017-04-10 RX ORDER — BUPROPION HYDROCHLORIDE 150 MG/1
150 TABLET ORAL
Qty: 30 TAB | Refills: 1 | Status: SHIPPED | OUTPATIENT
Start: 2017-04-10 | End: 2017-05-15 | Stop reason: SDUPTHER

## 2017-04-11 NOTE — PROGRESS NOTES
Ambulatory Initial Psychiatric Evaluation     Chief Complaint:   Chief Complaint   Patient presents with    New Patient     New pt for anxiety and depression, Referred by NP Kiko Miranda        History of Present Illness: Bob Méndez is a 78 y.o. , AA female who presents with long h/o MDD, Anxiety, remote h/o THC use and multiple medical problems was referrec by her primary care NP to establish her [place] [address] care here as her last psychiatrist, Dr. Pop Milton retired recently. Pt is currently stable on trazodone and Xanax for about a year. Her NP gave her Zoloft recently which she did not tolerate and is not taking it. Pt reports that she got into [place] [address] treatment long time ago with Dr. Sidra Franco, whom she saw for years here in [place]. After him she saw Dr. Amauri Denny for years and after his halfway few years ago she saw Dr. Pop Milton. In the past she was on lorazepam for years which was changed to Xanax by Dr. Amauri Denny and has worked well. She does not recall any other psych medications. She has never been hospitalized for MH reasons. Denies any long term illicit drugs or alcohol use d/o. Pt's current stressors include 's dementia related aggressive behaviors towards her. He has been suffering from dementia for last few years and also has multiple medical problems. She has some support from her cousin and grand son in providing 24/7 care for him. Pt reports she stays depressed, anxious and tired all the time. Her smoking has gone up. Sleep is OK. Eating fairly well. Denies any SI or HI. No psychosis or arlene. Denies any nightmares or flashbacks. Denies any obsession or compulsions. She is obese ( BMI=32) and has stable BP. MOCA: 28/30  GDS:5/15     Past Psychiatric History:     Previous psychiatric care: admits  As per HPI    Previous suicide attempts: none    Previous Hospitalizations: denies  none    Previous psychiatric medications/ECT/TMS: admits  As per HPI.  No ECT or TMS          History of rehab, detox, withdrawal: none    Social History:   Social History     Social History    Marital status:      Spouse name: N/A    Number of children: N/A    Years of education: N/A     Social History Main Topics    Smoking status: Current Every Day Smoker     Packs/day: 0.50    Smokeless tobacco: None      Comment: down to 1/2 ppd from 1 ppd    Alcohol use No    Drug use: No    Sexual activity: Not Asked     Other Topics Concern    None     Social History Narrative        Ethnic:   Relationship Status:  x 51 years. Has 2 adult children  Living Situation: With spouse   Employment: retired  Tobacco/Caffeine/Alchol use: smokes 1/2 ppd  Illicit Drug Social History:  Remote h/o THC use  Hobbies:  TV  Sexual:  not sexually active    Family History:   Family History   Problem Relation Age of Onset    No Known Problems Mother     No Known Problems Father         Past Medical History:   Past Medical History:   Diagnosis Date    Anxiety     Arthritis     Hypertension     Psychiatric disorder          Allergies: Allergies   Allergen Reactions    Citalopram Other (comments)     Not effective    Prozac [Fluoxetine] Anxiety     Irritable, restless        Medication List:   Current Outpatient Prescriptions   Medication Sig Dispense Refill    buPROPion XL (WELLBUTRIN XL) 150 mg tablet Take 1 Tab by mouth daily (after breakfast). Indications: ANXIETY WITH DEPRESSION 30 Tab 1    ALPRAZolam (XANAX) 1 mg tablet Take 1 Tab by mouth two (2) times daily as needed for Anxiety. Max Daily Amount: 2 mg. Indications: ANXIETY WITH DEPRESSION 45 Tab 1    icosapent ethyl (VASCEPA) 1 gram capsule Take 2 Caps by mouth two (2) times a day. 120 Cap 11    amLODIPine-atorvastatin (CADUET) 10-80 mg per tablet Take 1 Tab by mouth daily. 30 Tab 11    ipratropium (ATROVENT) 0.06 % nasal spray 2 Sprays by Both Nostrils route four (4) times daily.  15 mL 0    naproxen (NAPROSYN) 500 mg tablet Take 500 mg by mouth two (2) times daily (with meals).  aspirin delayed-release 81 mg tablet Take 1 Tab by mouth daily. 30 Tab 11    traZODone (DESYREL) 100 mg tablet Take 150 mg by mouth nightly.  sertraline (ZOLOFT) 25 mg tablet Take 1 Tab by mouth daily. 30 Tab 11      ROS:  Constitutional: positive for obese  Respiratory: negative for sputum or wheezing  Cardiovascular: negative for chest pain, palpitations  Gastrointestinal: negative for reflux symptoms and constipation  Genitourinary:negative for dysuria and urinary incontinence  Neurological: negative for seizures and memory problems  Behavioral/Psych: positive for anxiety and depression, negative for SI/HI    Psychiatric/Mental Status Examination:     MENTAL STATUS EXAM:  Sensorium  oriented to time, place and person   Orientation person, place, time/date, situation, day of week, month of year and year   Relations cooperative and passive   Eye Contact appropriate   Appearance:  age appropriate, casually dressed and overweight   Motor Behavior:  within normal limits   Speech:  normal pitch and normal volume   Vocabulary average   Thought Process: goal directed and logical   Thought Content free of delusions and free of hallucinations   Suicidal ideations none   Homicidal ideations none   Mood:  euthymic   Affect:  anxious   Memory recent  adequate   Memory remote:  adequate   Concentration:  adequate   Abstraction:  concrete   Insight:  good   Reliability fair   Judgment:  fair       Assessment & Diagnoses: This is a 78years old MBF with long h/o depression and anxiety is currently overwhelmed by her caregiver status for her  with dementia related behavioral problems.      Axis I: Depression with Anxiety, Chronic Adjustment d/o   Axis II: Deferred  Axis III: Arthritis, Hypertension, Obesity  Axis IV: Problems with primary support group, Problems related to social environment and Other psychosocial or environmental problems  Axis V:51-60 moderate symptoms    Treatment Plan:   1. Medication: begin Wellbutrin , 1 PO daily, continue Xanax and trazodone in the current dosages. 2. Discussed: the potential medication side effects  dry mouth, GI disturbance, insomnia, weight loss, somnolence  patient given opportunity to ask questions  3. Psychotherapy: none recommended at this time  4. Medical: with PCP  5. Return to Clinic: Follow-up Disposition:  Return in about 4 weeks (around 5/8/2017). 6. Discussed smoking cessation as she was started on Wellbutrin. The risk versus benefits of treatment were discussed and side effects explained. Patient agreed with plan. Patient instructed to call with any side effects.      Time spent with Patient:  30 to 74 minutes    Radha [place] MD  4/11/2017

## 2017-04-25 ENCOUNTER — OFFICE VISIT (OUTPATIENT)
Dept: INTERNAL MEDICINE CLINIC | Age: 67
End: 2017-04-25

## 2017-04-25 VITALS
DIASTOLIC BLOOD PRESSURE: 79 MMHG | HEART RATE: 92 BPM | WEIGHT: 190 LBS | OXYGEN SATURATION: 97 % | HEIGHT: 65 IN | SYSTOLIC BLOOD PRESSURE: 131 MMHG | BODY MASS INDEX: 31.65 KG/M2 | TEMPERATURE: 98.2 F | RESPIRATION RATE: 18 BRPM

## 2017-04-25 DIAGNOSIS — Z71.89 ADVANCE CARE PLANNING: ICD-10-CM

## 2017-04-25 DIAGNOSIS — I10 ESSENTIAL HYPERTENSION WITH GOAL BLOOD PRESSURE LESS THAN 140/90: ICD-10-CM

## 2017-04-25 DIAGNOSIS — F41.8 DEPRESSION WITH ANXIETY: ICD-10-CM

## 2017-04-25 DIAGNOSIS — F17.210 CIGARETTE NICOTINE DEPENDENCE WITHOUT COMPLICATION: ICD-10-CM

## 2017-04-25 DIAGNOSIS — Z00.00 MEDICARE ANNUAL WELLNESS VISIT, SUBSEQUENT: ICD-10-CM

## 2017-04-25 DIAGNOSIS — E78.1 HYPERTRIGLYCERIDEMIA WITHOUT HYPERCHOLESTEROLEMIA: Primary | ICD-10-CM

## 2017-04-25 NOTE — PROGRESS NOTES
Mack Velasquez is a 78 y.o. female and presents with Cholesterol Problem (follow up)    Subjective:  Pt presents to f/u elevated triglycerides. Continues modified diet, avoiding eggs and fried chicken. Taking meds daily as prescribed. Denies myalgias, slurring speech, unilateral weakness, and chest pain. A repeat non-fasting lipid profile was done. The patient does NOT use medications that may worsen dyslipidemias (corticosteroids, progestins, anabolic steroids, diuretics, beta-blockers, amiodarone, cyclosporine, olanzapine). The patient does try to exercise regularly. The patient is NOT known to have coexisting coronary artery disease. Taking Aspirin daily as prescribed/advised    Hypertension Review:  The patient has hypertension  Diet and Lifestyle: generally does try to follow a  low sodium diet, exercises sporadically   Home BP Monitoring: is not measured at home. Pertinent ROS: taking medications as instructed, no medication side effects noted. No TIA's, chest pain on exertion, dyspnea on exertion, or swelling of ankles. Smoking more lately due to increased stress r/t 's neck surgery and recovery. He also has alzheimers with agitation while at NH reported. BP Readings from Last 3 Encounters:   04/25/17 131/79   04/10/17 138/84   02/17/17 130/65     Also seen and treated by Dr. Jacinta Alfonso for anxiety and depression. Taking wellbutrin at this time. No reported side effects. Still with awakening early mornings to clean and feeling anxious about 's health, since he is NOT cooperating well. Tries to visit him as often as possible, when she is able to secure transportation.      Review of Systems  Constitutional: negative for fevers, chills, anorexia and weight loss  Respiratory:  negative for hemoptysis, dyspnea, and wheezing  CV:   negative for chest pain, palpitations, and lower extremity edema  GI:   negative for nausea, vomiting, diarrhea, abdominal pain, and melena  Endo:               negative for polyuria,polydipsia,polyphagia, and heat intolerance  Genitourinary: negative for frequency, urgency, dysuria, retention, and hematuria  Integument:  negative for rash, ulcerations, and pruritus  Hematologic:  negative for easy bruising and bleeding  Musculoskel: negative for arthralgias, muscle weakness,and joint pain/swelling  Neurological:  negative for headaches, dizziness, vertigo,and memory/gait problems  Behavl/Psych: negative for feelings of suicide, and mood changes    Past Medical History:   Diagnosis Date    Anxiety     Arthritis     Hypertension     Psychiatric disorder      History reviewed. No pertinent surgical history. Social History     Social History    Marital status:      Spouse name: N/A    Number of children: N/A    Years of education: N/A     Social History Main Topics    Smoking status: Current Every Day Smoker     Packs/day: 0.50    Smokeless tobacco: None      Comment: down to 1/2 ppd from 1 ppd    Alcohol use No    Drug use: No    Sexual activity: Not Asked     Other Topics Concern    None     Social History Narrative     Family History   Problem Relation Age of Onset    No Known Problems Mother     No Known Problems Father      Current Outpatient Prescriptions   Medication Sig Dispense Refill    buPROPion XL (WELLBUTRIN XL) 150 mg tablet Take 1 Tab by mouth daily (after breakfast). Indications: ANXIETY WITH DEPRESSION 30 Tab 1    ALPRAZolam (XANAX) 1 mg tablet Take 1 Tab by mouth two (2) times daily as needed for Anxiety. Max Daily Amount: 2 mg. Indications: ANXIETY WITH DEPRESSION 45 Tab 1    icosapent ethyl (VASCEPA) 1 gram capsule Take 2 Caps by mouth two (2) times a day. 120 Cap 11    amLODIPine-atorvastatin (CADUET) 10-80 mg per tablet Take 1 Tab by mouth daily. 30 Tab 11    ipratropium (ATROVENT) 0.06 % nasal spray 2 Sprays by Both Nostrils route four (4) times daily.  15 mL 0    naproxen (NAPROSYN) 500 mg tablet Take 500 mg by mouth two (2) times daily (with meals).  aspirin delayed-release 81 mg tablet Take 1 Tab by mouth daily. 30 Tab 11    traZODone (DESYREL) 100 mg tablet Take 150 mg by mouth nightly. Allergies   Allergen Reactions    Citalopram Other (comments)     Not effective    Prozac [Fluoxetine] Anxiety     Irritable, restless       Objective:  Visit Vitals    /79 (BP 1 Location: Left arm, BP Patient Position: Sitting)    Pulse 92    Temp 98.2 °F (36.8 °C) (Oral)    Resp 18    Ht 5' 5\" (1.651 m)    Wt 190 lb (86.2 kg)    SpO2 97%    BMI 31.62 kg/m2     Physical Exam:   General appearance - alert, well appearing, and in no distress. Mental status - A/O x 4, normal mood and affect. Neck -Supple ,normal CSP. FROM, non-tender. No significant adenopathy/thyromegaly. No JVD. Chest - CTA. Symmetric chest rise. No wheezing, rales or rhonchi. Heart - Normal rate, regular rhythm. Normal S1, S2. No MGR or clicks. Abdomen - Soft,non-distended. Normoactive BS in all quadrants. NT, no mass or HSM. Ext- Radial, DP pulses, 2+ bilaterally. No pedal edema, clubbing, or cyanosis. Skin-Warm and dry. No hyperpigmentation, ulcerations, or suspicious lesions. Neuro - Normal speech, no focal findings or movement disorder. Normal strength, gait, and muscle tone. Assessment of cognitive impairment: Alert and oriented x 4    Depression Screen:   PHQ 2 / 9, over the last two weeks 2/17/2017   Little interest or pleasure in doing things Not at all   Feeling down, depressed or hopeless Several days   Total Score PHQ 2 1       Fall Risk Assessment:    Fall Risk Assessment, last 12 mths 4/25/2017   Able to walk? Yes   Fall in past 12 months? No       Abuse Assessment: Patient NOT domesticly abuse (verbal, physical, and financial)    Current Alcohol use: no   reports that she does not drink alcohol. Functional Ability:   Does the patient exhibit a steady gait?  Yes   How long did it take the patient to get up and walk from a sitting position? 4 seconds   Is the patient self reliant?  (ie can do own laundry, meals, household chores) yes     Does the patient handle his/her own medications? yes     Does the patient handle his/her own money? Yes     Is the patients home safe (ie good lighting, handrails on stairs and bath, etc.)? Yes   Did you notice or did patient express any hearing difficulties? no   Did you notice of did patient express any vision difficulties? No, wears glasses   Were distance and reading eye charts used? n/a     Advance Care Planning:   Patient was offered the opportunity to discuss advance care planning:  Yes   Does patient have an Advance Directive: No   If no, did you provide information and forms? yes     Health Maintenance   Topic Date Due    BREAST CANCER SCRN MAMMOGRAM  06/01/2017    GLAUCOMA SCREENING Q2Y  07/01/2017    Pneumococcal 65+ Low/Medium Risk (2 of 2 - PPSV23) 01/17/2018    FOBT Q 1 YEAR AGE 50-75  01/17/2018    MEDICARE YEARLY EXAM  04/26/2018    DTaP/Tdap/Td series (2 - Td) 06/26/2025    Hepatitis C Screening  Completed    OSTEOPOROSIS SCREENING (DEXA)  Completed    ZOSTER VACCINE AGE 60>  Addressed    INFLUENZA AGE 9 TO ADULT  Addressed     Assessment/Plan:  The current medical regimen is effective;  continue present plan and medications. Labs ordered. See below for other orders   Follow-up Disposition:  Return in about 3 months (around 7/25/2017) for lipids, HTN.      ICD-10-CM ICD-9-CM    1. Hypertriglyceridemia without hypercholesterolemia E78.1 272.1 LIPID PANEL   2. Advance care planning Z71.89 V65.49    3. Medicare annual wellness visit, subsequent Z00.00 V70.0    4. Essential hypertension with goal blood pressure less than 140/90 I10 401.9    5. Cigarette nicotine dependence without complication O78.113 300.1    6. Depression with anxiety F41.8 300.4      Orders Placed This Encounter   Hina Wall expressed understanding of plan.  An After Visit Summary was offered/printed and given to the patient.

## 2017-04-25 NOTE — PATIENT INSTRUCTIONS
Advance Directives: Care Instructions  Your Care Instructions  An advance directive is a legal way to state your wishes at the end of your life. It tells your family and your doctor what to do if you can no longer say what you want. There are two main types of advance directives. You can change them any time that your wishes change. · A living will tells your family and your doctor your wishes about life support and other treatment. · A durable power of  for health care lets you name a person to make treatment decisions for you when you can't speak for yourself. This person is called a health care agent. If you do not have an advance directive, decisions about your medical care may be made by a doctor or a  who doesn't know you. It may help to think of an advance directive as a gift to the people who care for you. If you have one, they won't have to make tough decisions by themselves. Follow-up care is a key part of your treatment and safety. Be sure to make and go to all appointments, and call your doctor if you are having problems. It's also a good idea to know your test results and keep a list of the medicines you take. How can you care for yourself at home? · Discuss your wishes with your loved ones and your doctor. This way, there are no surprises. · Many states have a unique form. Or you might use a universal form that has been approved by many states. This kind of form can sometimes be completed and stored online. Your electronic copy will then be available wherever you have a connection to the Internet. In most cases, doctors will respect your wishes even if you have a form from a different state. · You don't need a  to do an advance directive. But you may want to get legal advice. · Think about these questions when you prepare an advance directive:  ¨ Who do you want to make decisions about your medical care if you are not able to?  Many people choose a family member or close friend. ¨ Do you know enough about life support methods that might be used? If not, talk to your doctor so you understand. ¨ What are you most afraid of that might happen? You might be afraid of having pain, losing your independence, or being kept alive by machines. ¨ Where would you prefer to die? Choices include your home, a hospital, or a nursing home. ¨ Would you like to have information about hospice care to support you and your family? ¨ Do you want to donate organs when you die? ¨ Do you want certain [employer] practices performed before you die? If so, put your wishes in the advance directive. · Read your advance directive every year, and make changes as needed. When should you call for help? Be sure to contact your doctor if you have any questions. Where can you learn more? Go to http://shraddha-giles.info/. Enter R264 in the search box to learn more about \"Advance Directives: Care Instructions. \"  Current as of: November 17, 2016  Content Version: 11.2  © 1606-9052 [employer]. Care instructions adapted under license by [employer] (which disclaims liability or warranty for this information). If you have questions about a medical condition or this instruction, always ask your healthcare professional. [address] any warranty or liability for your use of this information. Learning About High Cholesterol  What is high cholesterol? Cholesterol is a type of fat in your blood. It is needed for many body functions, such as making new cells. Cholesterol is made by your body. It also comes from food you eat. If you have too much cholesterol, it starts to build up in your arteries. This is called hardening of the arteries, or atherosclerosis. High cholesterol raises your risk of a heart attack and stroke. There are different types of cholesterol. LDL is the \"bad\" cholesterol.  High LDL can raise your risk for heart disease, heart attack, and stroke. HDL is the \"good\" cholesterol. High HDL is linked with a lower risk for heart disease, heart attack, and stroke. Your cholesterol levels help your doctor find out your risk for having a heart attack or stroke. How can you prevent high cholesterol? A heart-healthy lifestyle can help you prevent high cholesterol. This lifestyle helps lower your risk for a heart attack and stroke. · Eat heart-healthy foods. ¨ Eat fruits, vegetables, whole grains (like oatmeal), dried beans and peas, nuts and seeds, soy products (like tofu), and fat-free or low-fat dairy products. ¨ Replace butter, margarine, and hydrogenated or partially hydrogenated oils with olive and canola oils. (Canola oil margarine without trans fat is fine.)  ¨ Replace red meat with fish, poultry, and soy protein (like tofu). ¨ Limit processed and packaged foods like chips, crackers, and cookies. · Be active. Exercise can improve your cholesterol level. Get at least 30 minutes of exercise on most days of the week. Walking is a good choice. You also may want to do other activities, such as running, swimming, cycling, or playing tennis or team sports. · Stay at a healthy weight. Lose weight if you need to. · Don't smoke. If you need help quitting, talk to your doctor about stop-smoking programs and medicines. These can increase your chances of quitting for good. How is high cholesterol treated? The goal of treatment is to reduce your chances of having a heart attack or stroke. The goal is not to lower your cholesterol numbers only. · You may make lifestyle changes, such as eating healthy foods, not smoking, losing weight, and being more active. · You may have to take medicine. Follow-up care is a key part of your treatment and safety. Be sure to make and go to all appointments, and call your doctor if you are having problems. It's also a good idea to know your test results and keep a list of the medicines you take.   Where can you learn more? Go to http://shraddha-giles.info/. Enter O590 in the search box to learn more about \"Learning About High Cholesterol. \"  Current as of: January 27, 2016  Content Version: 11.2  © 9669-5984 [employer]. Care instructions adapted under license by [employer] (which disclaims liability or warranty for this information). If you have questions about a medical condition or this instruction, always ask your healthcare professional. [place] [address] any warranty or liability for your use of this information.

## 2017-04-25 NOTE — MR AVS SNAPSHOT
Visit Information Date & Time Provider Department Dept. Phone Encounter #  
 4/25/2017  8:40 AM Jairon Grace NP [address] [facility] [phone] [id number] Follow-up Instructions Return in about 3 months (around 7/25/2017) for lipids, HTN. Your Appointments 5/15/2017  9:20 AM  
ESTABLISHED PATIENT with Jasen Hunter MD  
[facility]) Appt Note: 4 week follow-up [address] [facility] Rusuhail Salazar [address]  
  
   
 [address] [facility] [address] [address] Upcoming Health Maintenance Date Due  
 BREAST CANCER SCRN MAMMOGRAM 6/1/2017 MEDICARE YEARLY EXAM 6/2/2017 GLAUCOMA SCREENING Q2Y 7/1/2017 Pneumococcal 65+ Low/Medium Risk (2 of 2 - PPSV23) 1/17/2018 FOBT Q 1 YEAR AGE 50-75 1/17/2018 DTaP/Tdap/Td series (2 - Td) 6/26/2025 Allergies as of 4/25/2017  Review Complete On: 4/25/2017 By: Mita Desai. RJ Rosales Severity Noted Reaction Type Reactions Citalopram  04/20/2016   Intolerance Other (comments) Not effective Prozac [Fluoxetine]  04/20/2016   Intolerance Anxiety Irritable, restless Current Immunizations  Never Reviewed No immunizations on file. Not reviewed this visit You Were Diagnosed With   
  
 Codes Comments Hypertriglyceridemia without hypercholesterolemia    -  Primary ICD-10-CM: E78.1 ICD-9-CM: 272.1 Advance care planning     ICD-10-CM: Z71.89 ICD-9-CM: V65.49 Medicare annual wellness visit, subsequent     ICD-10-CM: Z00.00 ICD-9-CM: V70.0 Vitals BP Pulse Temp Resp Height(growth percentile) Weight(growth percentile) 131/79 (BP 1 Location: Left arm, BP Patient Position: Sitting) 92 98.2 °F (36.8 °C) (Oral) 18 5' 5\" (1.651 m) 190 lb (86.2 kg) SpO2 BMI OB Status Smoking Status 97% 31.62 kg/m2 Postmenopausal Current Every Day Smoker Vitals History BMI and BSA Data Body Mass Index Body Surface Area  
 31.62 kg/m 2 1.99 m 2 Preferred Pharmacy Pharm[facility] Name Phone [facility] [address] [facility] [phone] Your Updated Medication List  
  
   
This list is accurate as of: 4/25/17  9:23 AM.  Always use your most recent med list.  
  
  
  
  
 ALPRAZolam 1 mg tablet Commonly known as:  Naomy Mannheim Take 1 Tab by mouth two (2) times daily as needed for Anxiety. Max Daily Amount: 2 mg. Indications: ANXIETY WITH DEPRESSION  
  
 amLODIPine-atorvastatin 10-80 mg per tablet Commonly known as:  CADUET Take 1 Tab by mouth daily. aspirin delayed-release 81 mg tablet Take 1 Tab by mouth daily. buPROPion  mg tablet Commonly known as:  [place] Stammer Take 1 Tab by mouth daily (after breakfast). Indications: ANXIETY WITH DEPRESSION  
  
 icosapent ethyl 1 gram capsule Commonly known as:  VASCEPA Take 2 Caps by mouth two (2) times a day. ipratropium 0.06 % nasal spray Commonly known as:  ATROVENT  
2 Sprays by Both Nostrils route four (4) times daily. naproxen 500 mg tablet Commonly known as:  NAPROSYN Take 500 mg by mouth two (2) times daily (with meals). sertraline 25 mg tablet Commonly known as:  ZOLOFT Take 1 Tab by mouth daily. traZODone 100 mg tablet Commonly known as:  Merleen Cables Take 150 mg by mouth nightly. We Performed the Following LIPID PANEL [16048 CPT(R)] Follow-up Instructions Return in about 3 months (around 7/25/2017) for lipids, HTN. Patient Instructions Advance Directives: Care Instructions Your Care Instructions An advance directive is a legal way to state your wishes at the end of your life. It tells your family and your doctor what to do if you can no longer say what you want. There are two main types of advance directives. You can change them any time that your wishes change. · A living will tells your family and your doctor your wishes about life support and other treatment. · A durable power of  for health care lets you name a person to make treatment decisions for you when you can't speak for yourself. This person is called a health care agent. If you do not have an advance directive, decisions about your medical care may be made by a doctor or a  who doesn't know you. It may help to think of an advance directive as a gift to the people who care for you. If you have one, they won't have to make tough decisions by themselves. Follow-up care is a key part of your treatment and safety. Be sure to make and go to all appointments, and call your doctor if you are having problems. It's also a good idea to know your test results and keep a list of the medicines you take. How can you care for yourself at home? · Discuss your wishes with your loved ones and your doctor. This way, there are no surprises. · Many states have a unique form. Or you might use a universal form that has been approved by many states. This kind of form can sometimes be completed and stored online. Your electronic copy will then be available wherever you have a connection to the Internet. In most cases, doctors will respect your wishes even if you have a form from a different state. · You don't need a  to do an advance directive. But you may want to get legal advice. · Think about these questions when you prepare an advance directive: ¨ Who do you want to make decisions about your medical care if you are not able to? Many people choose a family member or close friend. ¨ Do you know enough about life support methods that might be used? If not, talk to your doctor so you understand. ¨ What are you most afraid of that might happen? You might be afraid of having pain, losing your independence, or being kept alive by machines. ¨ Where would you prefer to die? Choices include your home, a hospital, or a nursing home. ¨ Would you like to have information about hospice care to support you and your family? ¨ Do you want to donate organs when you die? ¨ Do you want certain Mormonism practices performed before you die? If so, put your wishes in the advance directive. · Read your advance directive every year, and make changes as needed. When should you call for help? Be sure to contact your doctor if you have any questions. Where can you learn more? Go to http://shraddha-giles.info/. Enter R264 in the search box to learn more about \"Advance Directives: Care Instructions. \" Current as of: November 17, 2016 Content Version: 11.2 © 0440-4467 [employer]. Care instructions adapted under license by [employer] (which disclaims liability or warranty for this information). If you have questions about a medical condition or this instruction, always ask your healthcare professional. [place] [address] any warranty or liability for your use of this information. Learning About High Cholesterol What is high cholesterol? Cholesterol is a type of fat in your blood. It is needed for many body functions, such as making new cells. Cholesterol is made by your body. It also comes from food you eat. If you have too much cholesterol, it starts to build up in your arteries. This is called hardening of the arteries, or atherosclerosis. High cholesterol raises your risk of a heart attack and stroke. There are different types of cholesterol. LDL is the \"bad\" cholesterol. High LDL can raise your risk for heart disease, heart attack, and stroke. HDL is the \"good\" cholesterol. High HDL is linked with a lower risk for heart disease, heart attack, and stroke. Your cholesterol levels help your doctor find out your risk for having a heart attack or stroke. How can you prevent high cholesterol? A heart-healthy lifestyle can help you prevent high cholesterol. This lifestyle helps lower your risk for a heart attack and stroke. · Eat heart-healthy foods. ¨ Eat fruits, vegetables, whole grains (like oatmeal), dried beans and peas, nuts and seeds, soy products (like tofu), and fat-free or low-fat dairy products. ¨ Replace butter, margarine, and hydrogenated or partially hydrogenated oils with olive and canola oils. (Canola oil margarine without trans fat is fine.) ¨ Replace red meat with fish, poultry, and soy protein (like tofu). ¨ Limit processed and packaged foods like chips, crackers, and cookies. · Be active. Exercise can improve your cholesterol level. Get at least 30 minutes of exercise on most days of the week. Walking is a good choice. You also may want to do other activities, such as running, swimming, cycling, or playing tennis or team sports. · Stay at a healthy weight. Lose weight if you need to. · Don't smoke. If you need help quitting, talk to your doctor about stop-smoking programs and medicines. These can increase your chances of quitting for good. How is high cholesterol treated? The goal of treatment is to reduce your chances of having a heart attack or stroke. The goal is not to lower your cholesterol numbers only. · You may make lifestyle changes, such as eating healthy foods, not smoking, losing weight, and being more active. · You may have to take medicine. Follow-up care is a key part of your treatment and safety. Be sure to make and go to all appointments, and call your doctor if you are having problems. It's also a good idea to know your test results and keep a list of the medicines you take. Where can you learn more? Go to http://shraddha-giles.info/. Enter D208 in the search box to learn more about \"Learning About High Cholesterol. \" Current as of: January 27, 2016 Content Version: 11.2 © 5815-8746 Healthwise[employer] Incorporated. Care instructions adapted under license by [employer] (which disclaims liability or warranty for this information). If you have questions about a medical condition or this instruction, always ask your healthcare professional. [address] any warranty or liability for your use of this information. Introducing [facility]! [facility] introduces [employer] patient portal. Now you can access parts of your medical record, email your doctor's office, and request medication refills online. 1. In your internet browser, go to [email or web address] 2. Click on the First Time User? Click Here link in the Sign In box. You will see the New Member Sign Up page. 3. Enter your [employer] Access Code exactly as it appears below. You will not need to use this code after youve completed the sign-up process. If you do not sign up before the expiration date, you must request a new code. · [employer] Access Code: [email or web address] Expires: 5/15/2017 12:16 PM 
 
4. Enter the last four digits of your Social Security Number (xxxx) and Date of Birth (mm/dd/yyyy) as indicated and click Submit. You will be taken to the next sign-up page. 5. Create a Om[employer] ID. This will be your [employer] login ID and cannot be changed, so think of one that is secure and easy to remember. 6. Create a [employer] password. You can change your password at any time. 7. Enter your Password Reset Question and Answer. This can be used at a later time if you forget your password. 8. Enter your e-mail address. You will receive e-mail notification when new information is available in [address] 9. Click Sign Up. You can now view and download portions of your medical record. 10. Click the Download Summary menu link to download a portable copy of your medical information.  
 
If you have questions, please visit the Frequently Asked Questions section of the [employer]. Remember, boarding passhart is NOT to be used for urgent needs. For medical emergencies, dial 911. Now available from your iPhone and Android! Please provide this summary of care documentation to your next provider. Your primary care clinician is listed as CHRISTEN Luque. If you have any questions after today's visit, please call [phone].

## 2017-04-25 NOTE — PROGRESS NOTES
Pt is here for   Chief Complaint   Patient presents with    Cholesterol Problem     follow up     Pt denies pain at this time. ... 1. Have you been to the ER, urgent care clinic since your last visit? Hospitalized since your last visit? No    2. Have you seen or consulted any other health care providers outside of the [facility] since your last visit? Include any pap smears or colon screening.  No    Pt states mammogram is scheduled for June 17th

## 2017-04-25 NOTE — ACP (ADVANCE CARE PLANNING)
Advanced care planning- discussed Advance directive, Medical POA, and life sustaining options. Given/Mailing honoring [employer] paper work and contact info for Vashit  to complete paperwork in office. Advance Care Planning (ACP) Provider Conversation Snapshot    Date of ACP Conversation: 04/25/17  Persons included in Conversation:  Patient/family  Length of ACP Conversation in minutes:  5-10 minutes    Authorized Decision Maker (if patient is incapable of making informed decisions): This person is:   Healthcare Agent/Medical Power of  under Advance Directive  Interested in [facility] serving as primary [place]. For Patients with Decision Making Capacity:   Values/Goals: Exploration of values, goals, and preferences if recovery is not expected, even with continued medical treatment in the event of:  Severe, permanent brain injury  \"In these circumstances, what matters most to you? \"  Care focused more on comfort or quality of life.     Conversation Outcomes / Follow-Up Plan:   Recommended completion of Advance Directive form after review of ACP materials and conversation with prospective healthcare agent   Referral made for ACP follow-up assistance to:  Nurse navigator

## 2017-05-01 ENCOUNTER — HOSPITAL ENCOUNTER (OUTPATIENT)
Dept: LAB | Age: 67
Discharge: HOME | End: 2017-05-01

## 2017-05-01 PROCEDURE — 99001 SPECIMEN HANDLING PT-LAB: CPT | Performed by: NURSE PRACTITIONER

## 2017-05-02 LAB
CHOLEST SERPL-MCNC: 179 MG/DL (ref 100–199)
HDLC SERPL-MCNC: 50 MG/DL
INTERPRETATION, 910389: NORMAL
LDLC SERPL CALC-MCNC: 105 MG/DL (ref 0–99)
TRIGL SERPL-MCNC: 120 MG/DL (ref 0–149)
VLDLC SERPL CALC-MCNC: 24 MG/DL (ref 5–40)

## 2017-05-15 ENCOUNTER — OFFICE VISIT (OUTPATIENT)
Dept: BEHAVIORAL/MENTAL HEALTH CLINIC | Age: 67
End: 2017-05-15

## 2017-05-15 ENCOUNTER — TELEPHONE (OUTPATIENT)
Dept: BEHAVIORAL/MENTAL HEALTH CLINIC | Age: 67
End: 2017-05-15

## 2017-05-15 VITALS
DIASTOLIC BLOOD PRESSURE: 78 MMHG | SYSTOLIC BLOOD PRESSURE: 135 MMHG | OXYGEN SATURATION: 98 % | HEIGHT: 65 IN | HEART RATE: 72 BPM | WEIGHT: 190 LBS | BODY MASS INDEX: 31.65 KG/M2

## 2017-05-15 DIAGNOSIS — F17.210 CIGARETTE NICOTINE DEPENDENCE WITHOUT COMPLICATION: ICD-10-CM

## 2017-05-15 DIAGNOSIS — F32.A ANXIETY AND DEPRESSION: Primary | ICD-10-CM

## 2017-05-15 DIAGNOSIS — F41.9 ANXIETY AND DEPRESSION: Primary | ICD-10-CM

## 2017-05-15 PROBLEM — F41.8 DEPRESSION WITH ANXIETY: Status: RESOLVED | Noted: 2017-04-10 | Resolved: 2017-05-15

## 2017-05-15 RX ORDER — BUPROPION HYDROCHLORIDE 150 MG/1
150 TABLET ORAL
Qty: 30 TAB | Refills: 2 | Status: SHIPPED | OUTPATIENT
Start: 2017-05-15 | End: 2017-07-19 | Stop reason: SDUPTHER

## 2017-05-15 RX ORDER — ALPRAZOLAM 1 MG/1
1 TABLET ORAL
Qty: 45 TAB | Refills: 2 | Status: SHIPPED | OUTPATIENT
Start: 2017-05-15 | End: 2017-07-19 | Stop reason: SDUPTHER

## 2017-05-15 RX ORDER — TRAZODONE HYDROCHLORIDE 100 MG/1
150 TABLET ORAL
Qty: 30 TAB | Refills: 2 | Status: SHIPPED | OUTPATIENT
Start: 2017-05-15 | End: 2017-06-14

## 2017-05-15 NOTE — PROGRESS NOTES
Psychiatric Outpatient Progress Note    Account Number:  [id number]  Name: Nicole Kendrick    SUBJECTIVE:   CHIEF COMPLAINT:  Nicole Kendrick is a 78 y.o. , AA female and was seen today for first follow-up of psychiatric condition and psychotropic medication management. HPI:    Tawanna Shah reports the following psychiatric symptoms:  depression, anxiety and caregiver stress. The symptoms have been present for years and are of moderate severity. The symptoms occur daily. Pt reports that she is doing better with the addition of Wellbutrin and has been able to handle stress of caring for her  with dementia at home. He requires total care and demands her constant attention. Eating too much and wants food all the time. She gets overwhelmed often and tries to get out some when family help is available. Continues to smoke 1/2 ppd. Denies any psychosis or arlene. Contributing factors include: caregiver burden. Patient denies SI/HI/SIB. Side Effects:  none      Fam/Soc Hx (from [place] with updates):       REVIEW OF SYSTEMS:  Psychiatric:  depression, anxiety, stress  Appetite:good   Sleep: good       OBJECTIVE:                 Mental Status exam: WNL except for      Sensorium  oriented to time, place and person   Relations cooperative    Eye Contact    appropriate   Appearance:  age appropriate and casually dressed   Motor Behavior/Gait:  within normal limits   Speech:  normal pitch and normal volume   Thought Process: goal directed and logical   Thought Content free of delusions and free of hallucinations   Suicidal ideations none   Homicidal ideations none   Mood:  euthymic   Affect:  anxious   Memory recent  adequate   Memory remote:  adequate   Concentration:  adequate   Abstraction:  concrete   Insight:  fair   Reliability fair   Judgment:  fair       MEDICAL DECISION MAKING  Data: pertinent labs, imaging, medical records and diagnostic tests reviewed and incorporated in diagnosis and treatment plan    Allergies   Allergen Reactions    Citalopram Other (comments)     Not effective    Prozac [Fluoxetine] Anxiety     Irritable, restless        Current Outpatient Prescriptions   Medication Sig Dispense Refill    traZODone (DESYREL) 100 mg tablet Take 1.5 Tabs by mouth nightly for 30 days. 30 Tab 2    ALPRAZolam (XANAX) 1 mg tablet Take 1 Tab by mouth two (2) times daily as needed for Anxiety. Max Daily Amount: 2 mg. Indications: ANXIETY WITH DEPRESSION 45 Tab 2    buPROPion XL (WELLBUTRIN XL) 150 mg tablet Take 1 Tab by mouth daily (after breakfast). Indications: ANXIETY WITH DEPRESSION 30 Tab 2    icosapent ethyl (VASCEPA) 1 gram capsule Take 2 Caps by mouth two (2) times a day. 120 Cap 11    amLODIPine-atorvastatin (CADUET) 10-80 mg per tablet Take 1 Tab by mouth daily. 30 Tab 11    ipratropium (ATROVENT) 0.06 % nasal spray 2 Sprays by Both Nostrils route four (4) times daily. 15 mL 0    naproxen (NAPROSYN) 500 mg tablet Take 500 mg by mouth two (2) times daily (with meals).  aspirin delayed-release 81 mg tablet Take 1 Tab by mouth daily. 30 Tab 11        Visit Vitals    /78 (BP 1 Location: Left arm, BP Patient Position: Sitting)    Pulse 72    Ht 5' 5\" (1.651 m)    Wt 86.2 kg (190 lb)    SpO2 98%    BMI 31.62 kg/m2         Problems addressed today:    ICD-10-CM ICD-9-CM    1. Anxiety and depression F41.9 300.00 ALPRAZolam (XANAX) 1 mg tablet    F32.9 311    2. Cigarette nicotine dependence without complication Y11.868 325.8        Assessment:   Lesley Eden  is a 78 y.o.  female  is responding to treatment. Symptoms are improving. Patient denies SI/HI/SIB. No evidence of AH/VH or delusions. Risk Scoring- chronic illnesses and prescription drug management    Treatment Plan:  1. Medications:          Medication Changes/Adjustments: Recommended to take extra PRN Xanax during days of overwhelmning stress of caring for .   Continue Wellbutrin, trazodone and PRN Xanax    Current Outpatient Prescriptions   Medication Sig Dispense Refill    traZODone (DESYREL) 100 mg tablet Take 1.5 Tabs by mouth nightly for 30 days. 30 Tab 2    ALPRAZolam (XANAX) 1 mg tablet Take 1 Tab by mouth two (2) times daily as needed for Anxiety. Max Daily Amount: 2 mg. Indications: ANXIETY WITH DEPRESSION 45 Tab 2    buPROPion XL (WELLBUTRIN XL) 150 mg tablet Take 1 Tab by mouth daily (after breakfast). Indications: ANXIETY WITH DEPRESSION 30 Tab 2    icosapent ethyl (VASCEPA) 1 gram capsule Take 2 Caps by mouth two (2) times a day. 120 Cap 11    amLODIPine-atorvastatin (CADUET) 10-80 mg per tablet Take 1 Tab by mouth daily. 30 Tab 11    ipratropium (ATROVENT) 0.06 % nasal spray 2 Sprays by Both Nostrils route four (4) times daily. 15 mL 0    naproxen (NAPROSYN) 500 mg tablet Take 500 mg by mouth two (2) times daily (with meals).  aspirin delayed-release 81 mg tablet Take 1 Tab by mouth daily. 27 Tab 11                  The following regarding medications was addressed:    (The risks and benefits of the proposed medication; the potential medication side effects ie    dry mouth, weight gain, GI upset, headache; patient given opportunity to ask questions)       2. Counseling and coordination of care including instructions for treatment, risks/benefits, risk factor reduction and patient/family education. She agrees with the plan. Patient instructed to call with any side effects, questions or issues. 3.  Follow-up Disposition:  Return in about 2 months (around 7/15/2017). 4. Educated on smoking cessation    5. Educated on getting more family assistance in caring for her  with dementia. PSYCHOTHERAPY:  approx 20 minutes  Type:  Supportive/Solution Focused psychotherapy provided  Focus:     Current problems:   Caregiver burden   Medical issues     Psychoeducation provided on psych medications.     Treatment plan reviewed with patient-including diagnosis and medications      Cindi Shanell is slowly progressing.     Jasen Hunter MD  5/15/2017

## 2017-06-19 ENCOUNTER — TELEPHONE (OUTPATIENT)
Dept: INTERNAL MEDICINE CLINIC | Age: 67
End: 2017-06-19

## 2017-06-19 NOTE — TELEPHONE ENCOUNTER
Pt needs [employer] ins referral to see Dr. Brenda Coronel for knee pain @ [place]  [address] Pt # [phone]  Dr. Bhavya Grey fax # [phone]

## 2017-06-20 NOTE — TELEPHONE ENCOUNTER
Done! Insurance Authorization Received. Authorization number is [id number] starting 06/19/17 until 6/14/18 for 12 visits. Faxed to Dr. Kianna Pulido office Attn: Referral Coordinator. Fax confirmation received.

## 2017-07-19 ENCOUNTER — OFFICE VISIT (OUTPATIENT)
Dept: BEHAVIORAL/MENTAL HEALTH CLINIC | Age: 67
End: 2017-07-19

## 2017-07-19 ENCOUNTER — TELEPHONE (OUTPATIENT)
Dept: BEHAVIORAL/MENTAL HEALTH CLINIC | Age: 67
End: 2017-07-19

## 2017-07-19 VITALS
SYSTOLIC BLOOD PRESSURE: 133 MMHG | OXYGEN SATURATION: 97 % | HEART RATE: 81 BPM | DIASTOLIC BLOOD PRESSURE: 76 MMHG | WEIGHT: 191 LBS | BODY MASS INDEX: 31.82 KG/M2 | HEIGHT: 65 IN

## 2017-07-19 DIAGNOSIS — F17.210 CIGARETTE NICOTINE DEPENDENCE WITHOUT COMPLICATION: ICD-10-CM

## 2017-07-19 DIAGNOSIS — F32.A ANXIETY AND DEPRESSION: Primary | ICD-10-CM

## 2017-07-19 DIAGNOSIS — F41.9 ANXIETY AND DEPRESSION: Primary | ICD-10-CM

## 2017-07-19 RX ORDER — TRAZODONE HYDROCHLORIDE 100 MG/1
100 TABLET ORAL
Qty: 30 TAB | Refills: 2 | Status: SHIPPED | OUTPATIENT
Start: 2017-07-19 | End: 2017-10-18 | Stop reason: SDUPTHER

## 2017-07-19 RX ORDER — BUPROPION HYDROCHLORIDE 300 MG/1
300 TABLET ORAL
Qty: 30 TAB | Refills: 2 | Status: SHIPPED | OUTPATIENT
Start: 2017-07-19 | End: 2017-10-18 | Stop reason: SDUPTHER

## 2017-07-19 RX ORDER — ALPRAZOLAM 1 MG/1
1 TABLET ORAL
Qty: 60 TAB | Refills: 2 | Status: SHIPPED | OUTPATIENT
Start: 2017-07-19 | End: 2017-10-18 | Stop reason: SDUPTHER

## 2017-07-19 RX ORDER — TRAZODONE HYDROCHLORIDE 100 MG/1
100 TABLET ORAL
COMMUNITY
End: 2017-07-19 | Stop reason: SDUPTHER

## 2017-07-25 ENCOUNTER — OFFICE VISIT (OUTPATIENT)
Dept: INTERNAL MEDICINE CLINIC | Age: 67
End: 2017-07-25

## 2017-07-25 VITALS
TEMPERATURE: 98.4 F | HEIGHT: 65 IN | HEART RATE: 75 BPM | BODY MASS INDEX: 31.72 KG/M2 | SYSTOLIC BLOOD PRESSURE: 114 MMHG | RESPIRATION RATE: 18 BRPM | DIASTOLIC BLOOD PRESSURE: 75 MMHG | WEIGHT: 190.4 LBS | OXYGEN SATURATION: 97 %

## 2017-07-25 DIAGNOSIS — E78.1 HYPERTRIGLYCERIDEMIA WITHOUT HYPERCHOLESTEROLEMIA: Primary | ICD-10-CM

## 2017-07-25 DIAGNOSIS — F32.A ANXIETY AND DEPRESSION: ICD-10-CM

## 2017-07-25 DIAGNOSIS — Z13.31 DEPRESSION SCREENING: ICD-10-CM

## 2017-07-25 DIAGNOSIS — I10 ESSENTIAL HYPERTENSION WITH GOAL BLOOD PRESSURE LESS THAN 140/90: ICD-10-CM

## 2017-07-25 DIAGNOSIS — F17.210 CIGARETTE NICOTINE DEPENDENCE WITHOUT COMPLICATION: ICD-10-CM

## 2017-07-25 DIAGNOSIS — F41.9 ANXIETY AND DEPRESSION: ICD-10-CM

## 2017-07-25 DIAGNOSIS — R09.81 NASAL CONGESTION: ICD-10-CM

## 2017-07-25 RX ORDER — BUPROPION HYDROCHLORIDE 150 MG/1
TABLET ORAL
COMMUNITY
Start: 2017-07-05 | End: 2017-07-25 | Stop reason: DRUGHIGH

## 2017-07-25 RX ORDER — IPRATROPIUM BROMIDE 42 UG/1
2 SPRAY, METERED NASAL 4 TIMES DAILY
Qty: 15 ML | Refills: 0 | Status: SHIPPED | OUTPATIENT
Start: 2017-07-25 | End: 2019-01-30 | Stop reason: SDUPTHER

## 2017-07-25 RX ORDER — AMLODIPINE BESYLATE AND ATORVASTATIN CALCIUM 10; 80 MG/1; MG/1
1 TABLET, FILM COATED ORAL DAILY
Qty: 30 TAB | Refills: 11 | Status: SHIPPED | OUTPATIENT
Start: 2017-07-25 | End: 2018-07-30 | Stop reason: SDUPTHER

## 2017-07-25 RX ORDER — ICOSAPENT ETHYL 1000 MG/1
2 CAPSULE ORAL 2 TIMES DAILY
Qty: 120 CAP | Refills: 11 | Status: SHIPPED | OUTPATIENT
Start: 2017-07-25 | End: 2018-07-30 | Stop reason: SDUPTHER

## 2017-07-25 NOTE — PROGRESS NOTES
Dianna Collier is a 78 y.o. female and presents with Hypertension (follow up) and Cholesterol Problem (follow up)    Subjective:  Pt presents to f/u elevated triglycerides. Continues modified diet, avoiding eggs and fried chicken. Taking meds daily as prescribed. Denies myalgias, slurring speech, unilateral weakness, and chest pain. A repeat fasting lipid profile was ordered. The patient does NOT use medications that may worsen dyslipidemias (corticosteroids, progestins, anabolic steroids, diuretics, beta-blockers, amiodarone, cyclosporine, olanzapine). The patient does try to exercise regularly. The patient is NOT known to have coexisting coronary artery disease. Taking Aspirin daily as prescribed/advised    Hypertension Review:  The patient has hypertension  Diet and Lifestyle: generally does try to follow a  low sodium diet, exercises sporadically   Home BP Monitoring: is not measured at home. Pertinent ROS: taking medications as instructed, no medication side effects noted. No TIA's, chest pain on exertion, dyspnea on exertion, or swelling of ankles. BP Readings from Last 3 Encounters:   07/25/17 114/75   07/19/17 133/76   05/15/17 135/78     Also seen and treated by Dr. Jai Espino for anxiety and depression. Wellbutrin INCREASED and prozac stopped. Trazodone started also. PHQ over the last two weeks 7/25/2017   PHQ Not Done -   Little interest or pleasure in doing things Not at all   Feeling down, depressed or hopeless Several days   Total Score PHQ 2 1     Smoking cessation Review:  Patient presents to discuss smoking cessation. The patient currently is smoking 2-3 cigs/day. She has smoked for multiple  years. She has tried to quit multiple times in the past using cold turkey. Hopes wellbutrin will help her quit.     Review of Systems  Constitutional: negative for fevers, chills, anorexia and weight loss  Respiratory:  negative for hemoptysis, dyspnea, and wheezing  CV:   negative for chest pain, palpitations, and lower extremity edema  GI:   negative for nausea, vomiting, diarrhea, abdominal pain, and melena  Endo:               negative for polyuria,polydipsia,polyphagia, and heat intolerance  Genitourinary: negative for frequency, urgency, dysuria, retention, and hematuria  Integument:  negative for rash, ulcerations, and pruritus  Hematologic:  negative for easy bruising and bleeding  Musculoskel: negative for arthralgias, muscle weakness,and joint pain/swelling  Neurological:  negative for headaches, dizziness, vertigo,and memory/gait problems  Behavl/Psych: negative for feelings of suicide, and mood changes    Past Medical History:   Diagnosis Date    Anxiety     Arthritis     Hypertension     Psychiatric disorder      History reviewed. No pertinent surgical history. Social History     Social History    Marital status:      Spouse name: N/A    Number of children: N/A    Years of education: N/A     Social History Main Topics    Smoking status: Current Every Day Smoker     Packs/day: 0.50    Smokeless tobacco: Never Used      Comment: down to 1/2 ppd from 1 ppd    Alcohol use No    Drug use: No    Sexual activity: Not Asked     Other Topics Concern    None     Social History Narrative     Family History   Problem Relation Age of Onset    No Known Problems Mother     No Known Problems Father      Current Outpatient Prescriptions   Medication Sig Dispense Refill    ipratropium (ATROVENT) 0.06 % nasal spray 2 Sprays by Both Nostrils route four (4) times daily. 15 mL 0    amLODIPine-atorvastatin (CADUET) 10-80 mg per tablet Take 1 Tab by mouth daily. 30 Tab 11    icosapent ethyl (VASCEPA) 1 gram capsule Take 2 Caps by mouth two (2) times a day. 120 Cap 11    traZODone (DESYREL) 100 mg tablet Take 1 Tab by mouth nightly. 30 Tab 2    ALPRAZolam (XANAX) 1 mg tablet Take 1 Tab by mouth two (2) times daily as needed for Anxiety. Max Daily Amount: 2 mg.  Indications: ANXIETY WITH DEPRESSION 60 Tab 2    buPROPion XL (WELLBUTRIN XL) 300 mg XL tablet Take 1 Tab by mouth daily (after breakfast). Indications: ANXIETY WITH DEPRESSION 30 Tab 2    aspirin delayed-release 81 mg tablet Take 1 Tab by mouth daily. 30 Tab 11     Allergies   Allergen Reactions    Citalopram Other (comments)     Not effective    Prozac [Fluoxetine] Anxiety     Irritable, restless       Objective:  Visit Vitals    /75 (BP 1 Location: Right arm, BP Patient Position: Sitting)    Pulse 75    Temp 98.4 °F (36.9 °C) (Oral)    Resp 18    Ht 5' 5\" (1.651 m)    Wt 190 lb 6.4 oz (86.4 kg)    SpO2 97%    BMI 31.68 kg/m2     Physical Exam:   General appearance - alert, well appearing, and in no distress. Mental status - A/O x 4, normal mood and affect. Neck -Supple ,normal CSP. FROM, non-tender. No significant adenopathy/thyromegaly. No JVD. Chest - CTA. Symmetric chest rise. No wheezing, rales or rhonchi. Heart - Normal rate, regular rhythm. Normal S1, S2. No MGR or clicks. Abdomen - Soft,non-distended. Normoactive BS in all quadrants. NT, no mass or HSM. Ext- Radial, DP pulses, 2+ bilaterally. No pedal edema, clubbing, or cyanosis. Skin-Warm and dry. No hyperpigmentation, ulcerations, or suspicious lesions. Neuro - Normal speech, no focal findings or movement disorder. Normal strength, gait, and muscle tone. Assessment/Plan:  The current medical regimen is effective;  continue present plan and medications. Labs ordered. Smoking cessation discussed for 3-10 minutes, pt planning to quit with Wellbutrin, no further meds today. See below for other orders   Follow-up Disposition:  Return in about 6 months (around 1/18/2018) for HTN, lipids 6 month f/u. ICD-10-CM ICD-9-CM    1. Hypertriglyceridemia without hypercholesterolemia E78.1 272.1 amLODIPine-atorvastatin (CADUET) 10-80 mg per tablet      icosapent ethyl (VASCEPA) 1 gram capsule      LIPID PANEL   2.  Depression screening Z13.89 V79.0    3. Essential hypertension with goal blood pressure less than 140/90 I10 401.9    4. Anxiety and depression F41.9 300.00     F32.9 311    5. Nasal congestion R09.81 478.19 ipratropium (ATROVENT) 0.06 % nasal spray   6. Cigarette nicotine dependence without complication B91.303 087.3      Orders Placed This Encounter    LIPID PANEL    DISCONTD: buPROPion XL (WELLBUTRIN XL) 150 mg tablet    ipratropium (ATROVENT) 0.06 % nasal spray     Si Sprays by Both Nostrils route four (4) times daily. Dispense:  15 mL     Refill:  0    amLODIPine-atorvastatin (CADUET) 10-80 mg per tablet     Sig: Take 1 Tab by mouth daily. Dispense:  30 Tab     Refill:  11    icosapent ethyl (VASCEPA) 1 gram capsule     Sig: Take 2 Caps by mouth two (2) times a day. Dispense:  120 Cap     Refill:  [address] expressed understanding of plan. An After Visit Summary was offered/printed and given to the patient.

## 2017-07-25 NOTE — PATIENT INSTRUCTIONS
Learning About the [facility] Diet  What is the Mediterranean diet? The Mediterranean diet is a style of eating rather than a diet plan. It features foods eaten in [place], [place], [place] and [place], and other countries along the [facility]. It emphasizes eating foods like fish, fruits, vegetables, beans, high-fiber breads and whole grains, nuts, and olive oil. This style of eating includes limited red meat, cheese, and sweets. Why choose the Mediterranean diet? A Mediterranean-style diet may improve heart health. It contains more fat than other heart-healthy diets. But the fats are mainly from nuts, unsaturated oils (such as fish oils and olive oil), and certain nut or seed oils (such as canola, soybean, or flaxseed oil). These fats may help protect the heart and blood vessels. How can you get started on the Mediterranean diet? Here are some things you can do to switch to a more Mediterranean way of eating. What to eat  · Eat a variety of fruits and vegetables each day, such as grapes, blueberries, tomatoes, broccoli, peppers, figs, olives, spinach, eggplant, beans, lentils, and chickpeas. · Eat a variety of whole-grain foods each day, such as oats, brown rice, and whole wheat bread, pasta, and couscous. · Eat fish at least 2 times a week. Try tuna, salmon, mackerel, lake trout, herring, or sardines. · Eat moderate amounts of low-fat dairy products, such as milk, cheese, or yogurt. · Eat moderate amounts of poultry and eggs. · Choose healthy (unsaturated) fats, such as nuts, olive oil, and certain nut or seed oils like canola, soybean, and flaxseed. · Limit unhealthy (saturated) fats, such as butter, palm oil, and coconut oil. And limit fats found in animal products, such as meat and dairy products made with whole milk. Try to eat red meat only a few times a month in very small amounts. · Limit sweets and desserts to only a few times a week.  This includes sugar-sweetened drinks like soda. The Mediterranean diet may also include red wine with your meal--1 glass each day for women and up to 2 glasses a day for men. Tips for eating at home  · Use herbs, spices, garlic, lemon zest, and citrus juice instead of salt to add flavor to foods. · Add avocado slices to your sandwich instead of luna. · Have fish for lunch or dinner instead of red meat. Brush the fish with olive oil, and broil or grill it. · Sprinkle your salad with seeds or nuts instead of cheese. · Cook with olive or canola oil instead of butter or oils that are high in saturated fat. · Switch from 2% milk or whole milk to 1% or fat-free milk. · Dip raw vegetables in a vinaigrette dressing or hummus instead of dips made from mayonnaise or sour cream.  · Have a piece of fruit for dessert instead of a piece of cake. Try baked apples, or have some dried fruit. Tips for eating out  · Try broiled, grilled, baked, or poached fish instead of having it fried or breaded. · Ask your  to have your meals prepared with olive oil instead of butter. · Order dishes made with marinara sauce or sauces made from olive oil. Avoid sauces made from cream or mayonnaise. · Choose whole-grain breads, whole wheat pasta and pizza crust, brown rice, beans, and lentils. · Cut back on butter or margarine on bread. Instead, you can dip your bread in a small amount of olive oil. · Ask for a side salad or grilled vegetables instead of french fries or chips. Where can you learn more? Go to http://shraddha-giles.info/. Enter 145-095-4050 in the search box to learn more about \"Learning About the Mediterranean Diet. \"  Current as of: December 29, 2016  Content Version: 11.3  © 0214-5626 [employer]. Care instructions adapted under license by [employer] (which disclaims liability or warranty for this information).  If you have questions about a medical condition or this instruction, always ask your healthcare professional. [address] any warranty or liability for your use of this information. Learning About Benefits From Quitting Smoking  How does quitting smoking make you healthier? If you're thinking about quitting smoking, you may have a few reasons to be smoke-free. Your health may be one of them. · When you quit smoking, you lower your risks for cancer, lung disease, heart attack, stroke, blood vessel disease, and blindness from macular degeneration. · When you're smoke-free, you get sick less often, and you heal faster. You are less likely to get colds, flu, bronchitis, and pneumonia. · As a nonsmoker, you may find that your mood is better and you are less stressed. When and how will you feel healthier? Quitting has real health benefits that start from day 1 of being smoke-free. And the longer you stay smoke-free, the healthier you get and the better you feel. The first hours  · After just 20 minutes, your blood pressure and heart rate go down. That means there's less stress on your heart and blood vessels. · Within 12 hours, the level of carbon monoxide in your blood drops back to normal. That makes room for more oxygen. With more oxygen in your body, you may notice that you have more energy than when you smoked. After 2 weeks  · Your lungs start to work better. · Your risk of heart attack starts to drop. After 1 month  · When your lungs are clear, you cough less and breathe deeper, so it's easier to be active. · Your sense of taste and smell return. That means you can enjoy food more than you have since you started smoking. Over the years  · After 1 year, your risk of heart disease is half what it would be if you kept smoking. · After 5 years, your risk of stroke starts to shrink. Within a few years after that, it's about the same as if you'd never smoked. · After 10 years, your risk of dying from lung cancer is cut by about half.  And your risk for many other types of cancer is lower too. How would quitting help others in your life? When you quit smoking, you improve the health of everyone who now breathes in your smoke. · Their heart, lung, and cancer risks drop, much like yours. · They are sick less. For babies and small children, living smoke-free means they're less likely to have ear infections, pneumonia, and bronchitis. · If you're a woman who is or will be pregnant someday, quitting smoking means a healthier . · Children who are close to you are less likely to become adult smokers. Where can you learn more? Go to http://shraddhaDana-Farber Cancer Institutegiles.info/. Enter [phone] 72 11 in the search box to learn more about \"Learning About Benefits From Quitting Smoking. \"  Current as of: 2017  Content Version: 11.3  © 8947-8746 "[employer]. Care instructions adapted under license by [employer] (which disclaims liability or warranty for this information). If you have questions about a medical condition or this instruction, always ask your healthcare professional. [place] [address] any warranty or liability for your use of this information.

## 2017-07-25 NOTE — MR AVS SNAPSHOT
Visit Information Date & Time Provider Department Dept. Phone Encounter #  
 7/25/2017  9:00 AM Ashley Lerma NP [address] [facility] [phone] [id number] Follow-up Instructions Return in about 6 months (around 1/18/2018) for HTN, lipids 6 month f/u. Your Appointments 10/18/2017  9:40 AM  
ESTABLISHED PATIENT with Pamela Lala MD  
[facility]) Appt Note: 3 month follow-up [address] [facility] [address] [facility] [address]  
[phone]  
  
   
 [address] [facility] [address] [address]  
  
    
 4/26/2018  9:20 AM  
Medicare Physical with Ashley Lerma NP  
[address] [facility]) Appt Note: ANNUAL MEDICARE WELLNESS  
 [address] [facility] [id number]  
[phone]  
  
   
 [address] [facility] Upcoming Health Maintenance Date Due  
 BREAST CANCER SCRN MAMMOGRAM 6/1/2017 GLAUCOMA SCREENING Q2Y 7/1/2017 INFLUENZA AGE 9 TO ADULT 8/1/2017 Pneumococcal 65+ Low/Medium Risk (2 of 2 - PPSV23) 1/17/2018 FOBT Q 1 YEAR AGE 50-75 1/17/2018 MEDICARE YEARLY EXAM 4/26/2018 DTaP/Tdap/Td series (2 - Td) 6/26/2025 Allergies as of 7/25/2017  Review Complete On: 7/25/2017 By: Peggy Martinez. RJ Rosales Severity Noted Reaction Type Reactions Citalopram  04/20/2016   Intolerance Other (comments) Not effective Prozac [Fluoxetine]  04/20/2016   Intolerance Anxiety Irritable, restless Current Immunizations  Never Reviewed No immunizations on file. Not reviewed this visit You Were Diagnosed With   
  
 Codes Comments Hypertriglyceridemia without hypercholesterolemia    -  Primary ICD-10-CM: E78.1 ICD-9-CM: 272.1 Depression screening     ICD-10-CM: Z13.89 ICD-9-CM: V79.0 Essential hypertension with goal blood pressure less than 140/90     ICD-10-CM: I10 
ICD-9-CM: 401.9 Anxiety and depression     ICD-10-CM: F41.9, F32.9 ICD-9-CM: 300.00, 311 Nasal congestion     ICD-10-CM: R09.81 ICD-9-CM: 478.19 Cigarette nicotine dependence without complication     [facility]-23-PW: F17.210 ICD-9-CM: 305.1 Vitals BP Pulse Temp Resp Height(growth percentile) Weight(growth percentile) 114/75 (BP 1 Location: Right arm, BP Patient Position: Sitting) 75 98.4 °F (36.9 °C) (Oral) 18 5' 5\" (1.651 m) 190 lb 6.4 oz (86.4 kg) SpO2 BMI OB Status Smoking Status 97% 31.68 kg/m2 Postmenopausal Current Every Day Smoker Vitals History BMI and BSA Data Body Mass Index Body Surface Area  
 31.68 kg/m 2 1.99 m 2 Preferred Pharmacy Pharmacy Name Phone Franco Paget [address] [facility] [phone] Your Updated Medication List  
  
   
This list is accurate as of: 7/25/17  9:29 AM.  Always use your most recent med list.  
  
  
  
  
 ALPRAZolam 1 mg tablet Commonly known as:  Evelin Diaz Take 1 Tab by mouth two (2) times daily as needed for Anxiety. Max Daily Amount: 2 mg. Indications: ANXIETY WITH DEPRESSION  
  
 amLODIPine-atorvastatin 10-80 mg per tablet Commonly known as:  CADUET Take 1 Tab by mouth daily. aspirin delayed-release 81 mg tablet Take 1 Tab by mouth daily. * buPROPion  mg tablet Commonly known as:  WELLBUTRIN XL  
  
 * buPROPion  mg XL tablet Commonly known as:  Albertina Buckle Take 1 Tab by mouth daily (after breakfast). Indications: ANXIETY WITH DEPRESSION  
  
 icosapent ethyl 1 gram capsule Commonly known as:  VASCEPA Take 2 Caps by mouth two (2) times a day. ipratropium 0.06 % nasal spray Commonly known as:  ATROVENT  
2 Sprays by Both Nostrils route four (4) times daily. traZODone 100 mg tablet Commonly known as:  Mark Hendersonon Take 1 Tab by mouth nightly. * Notice: This list has 2 medication(s) that are the same as other medications prescribed for you. Read the directions carefully, and ask your doctor or other care provider to review them with you. Prescriptions Sent to Pharmacy Refills  
 ipratropium (ATROVENT) 0.06 % nasal spray 0 Si Sprays by Both Nostrils route four (4) times daily. Class: Normal  
 Pharmacy: [address] Ph #: [phone] Route: Both Nostrils  
 amLODIPine-atorvastatin (CADUET) 10-80 mg per tablet 11 Sig: Take 1 Tab by mouth daily. Class: Normal  
 Pharmacy: [address] Ph #: [phone] Route: Oral  
 icosapent ethyl (VASCEPA) 1 gram capsule 11 Sig: Take 2 Caps by mouth two (2) times a day. Class: Normal  
 Pharmacy: [address] Ph #: [phone] Route: Oral  
  
We Performed the Following LIPID PANEL [71133 CPT(R)] Follow-up Instructions Return in about 6 months (around 2018) for HTN, lipids 6 month f/u. Patient Instructions Learning About the [facility] [address] Diet What is the Mediterranean diet? The Mediterranean diet is a style of eating rather than a diet plan. It features foods eaten in [place], [place], [place] and [place], and other countries along the [facility]. It emphasizes eating foods like fish, fruits, vegetables, beans, high-fiber breads and whole grains, nuts, and olive oil. This style of eating includes limited red meat, cheese, and sweets. Why choose the Mediterranean diet? A Mediterranean-style diet may improve heart health. It contains more fat than other heart-healthy diets.  But the fats are mainly from nuts, unsaturated oils (such as fish oils and olive oil), and certain nut or seed oils (such as canola, soybean, or flaxseed oil). These fats may help protect the heart and blood vessels. How can you get started on the Mediterranean diet? Here are some things you can do to switch to a more Mediterranean way of eating. What to eat · Eat a variety of fruits and vegetables each day, such as grapes, blueberries, tomatoes, broccoli, peppers, figs, olives, spinach, eggplant, beans, lentils, and chickpeas. · Eat a variety of whole-grain foods each day, such as oats, brown rice, and whole wheat bread, pasta, and couscous. · Eat fish at least 2 times a week. Try tuna, salmon, mackerel, lake trout, herring, or sardines. · Eat moderate amounts of low-fat dairy products, such as milk, cheese, or yogurt. · Eat moderate amounts of poultry and eggs. · Choose healthy (unsaturated) fats, such as nuts, olive oil, and certain nut or seed oils like canola, soybean, and flaxseed. · Limit unhealthy (saturated) fats, such as butter, palm oil, and coconut oil. And limit fats found in animal products, such as meat and dairy products made with whole milk. Try to eat red meat only a few times a month in very small amounts. · Limit sweets and desserts to only a few times a week. This includes sugar-sweetened drinks like soda. The Mediterranean diet may also include red wine with your meal1 glass each day for women and up to 2 glasses a day for men. Tips for eating at home · Use herbs, spices, garlic, lemon zest, and citrus juice instead of salt to add flavor to foods. · Add avocado slices to your sandwich instead of luna. · Have fish for lunch or dinner instead of red meat. Brush the fish with olive oil, and broil or grill it. · Sprinkle your salad with seeds or nuts instead of cheese. · Cook with olive or canola oil instead of butter or oils that are high in saturated fat. · Switch from 2% milk or whole milk to 1% or fat-free milk. · Dip raw vegetables in a vinaigrette dressing or hummus instead of dips made from mayonnaise or sour cream. 
· Have a piece of fruit for dessert instead of a piece of cake. Try baked apples, or have some dried fruit. Tips for eating out · Try broiled, grilled, baked, or poached fish instead of having it fried or breaded. · Ask your  to have your meals prepared with olive oil instead of butter. · Order dishes made with marinara sauce or sauces made from olive oil. Avoid sauces made from cream or mayonnaise. · Choose whole-grain breads, whole wheat pasta and pizza crust, brown rice, beans, and lentils. · Cut back on butter or margarine on bread. Instead, you can dip your bread in a small amount of olive oil. · Ask for a side salad or grilled vegetables instead of french fries or chips. Where can you learn more? Go to http://shraddha-giles.info/. Enter [phone] in the search box to learn more about \"Learning About the Mediterranean Diet. \" Current as of: December 29, 2016 Content Version: 11.3 © 1277-8076 [employer]. Care instructions adapted under license by [employer] (which disclaims liability or warranty for this information). If you have questions about a medical condition or this instruction, always ask your healthcare professional. [place] [address] any warranty or liability for your use of this information. Learning About Benefits From Quitting Smoking How does quitting smoking make you healthier? If you're thinking about quitting smoking, you may have a few reasons to be smoke-free. Your health may be one of them. · When you quit smoking, you lower your risks for cancer, lung disease, heart attack, stroke, blood vessel disease, and blindness from macular degeneration. · When you're smoke-free, you get sick less often, and you heal faster. You are less likely to get colds, flu, bronchitis, and pneumonia. · As a nonsmoker, you may find that your mood is better and you are less stressed. When and how will you feel healthier? Quitting has real health benefits that start from day 1 of being smoke-free. And the longer you stay smoke-free, the healthier you get and the better you feel. The first hours · After just 20 minutes, your blood pressure and heart rate go down. That means there's less stress on your heart and blood vessels. · Within 12 hours, the level of carbon monoxide in your blood drops back to normal. That makes room for more oxygen. With more oxygen in your body, you may notice that you have more energy than when you smoked. After 2 weeks · Your lungs start to work better. · Your risk of heart attack starts to drop. After 1 month · When your lungs are clear, you cough less and breathe deeper, so it's easier to be active. · Your sense of taste and smell return. That means you can enjoy food more than you have since you started smoking. Over the years · After 1 year, your risk of heart disease is half what it would be if you kept smoking. · After 5 years, your risk of stroke starts to shrink. Within a few years after that, it's about the same as if you'd never smoked. · After 10 years, your risk of dying from lung cancer is cut by about half. And your risk for many other types of cancer is lower too. How would quitting help others in your life? When you quit smoking, you improve the health of everyone who now breathes in your smoke. · Their heart, lung, and cancer risks drop, much like yours. · They are sick less. For babies and small children, living smoke-free means they're less likely to have ear infections, pneumonia, and bronchitis. · If you're a woman who is or will be pregnant someday, quitting smoking means a healthier . · Children who are close to you are less likely to become adult smokers. Where can you learn more? Go to http://shraddha-giles.info/. Enter [phone] in the search box to learn more about \"Learning About Benefits From Quitting Smoking. \" Current as of: March 20, 2017 Content Version: 11.3 © 7658-8220 [employer]. Care instructions adapted under license by [employer] (which disclaims liability or warranty for this information). If you have questions about a medical condition or this instruction, always ask your healthcare professional. [place] [address] any warranty or liability for your use of this information. Introducing [facility]! Alivia Murray introduces [employer] patient portal. Now you can access parts of your medical record, email your doctor's office, and request medication refills online. 1. In your internet browser, go to [email or web address] 2. Click on the First Time User? Click Here link in the Sign In box. You will see the New Member Sign Up page. 3. Enter your [employer] Access Code exactly as it appears below. You will not need to use this code after youve completed the sign-up process. If you do not sign up before the expiration date, you must request a new code. · [employer] Access Code: [email or web address] Expires: 10/23/2017  8:36 AM 
 
4. Enter the last four digits of your Social Security Number (xxxx) and Date of Birth (mm/dd/yyyy) as indicated and click Submit. You will be taken to the next sign-up page. 5. Create a [employer] ID. This will be your [employer] login ID and cannot be changed, so think of one that is secure and easy to remember. 6. Create a [employer] password. You can change your password at any time. 7. Enter your Password Reset Question and Answer. This can be used at a later time if you forget your password. 8. Enter your e-mail address. You will receive e-mail notification when new information is available in [address] 9. Click Sign Up. You can now view and download portions of your medical record. 10. Click the Download Summary menu link to download a portable copy of your medical information. If you have questions, please visit the Frequently Asked Questions section of the [employer] website. Remember, [employer] is NOT to be used for urgent needs. For medical emergencies, dial 911. Now available from your iPhone and Android! Please provide this summary of care documentation to your next provider. Your primary care clinician is listed as CHRISTEN Salinas. If you have any questions after today's visit, please call [phone].

## 2017-07-25 NOTE — PROGRESS NOTES
Pt is here for   Chief Complaint   Patient presents with    Hypertension     follow up    Cholesterol Problem     follow up     Pt denies pain at this time     1. Have you been to the ER, urgent care clinic since your last visit? Hospitalized since your last visit? No    2. Have you seen or consulted any other health care providers outside of the [address] [place] [address] since your last visit? Include any pap smears or colon screening.  No    Pt would like BP medications to be sent to [facility]

## 2017-08-17 ENCOUNTER — HOSPITAL ENCOUNTER (OUTPATIENT)
Dept: MAMMOGRAPHY | Age: 67
Discharge: HOME | End: 2017-08-17
Attending: NURSE PRACTITIONER
Payer: MEDICARE

## 2017-08-17 ENCOUNTER — HOSPITAL ENCOUNTER (OUTPATIENT)
Dept: LAB | Age: 67
Discharge: HOME | End: 2017-08-17

## 2017-08-17 DIAGNOSIS — Z12.31 VISIT FOR SCREENING MAMMOGRAM: ICD-10-CM

## 2017-08-17 PROCEDURE — 77067 SCR MAMMO BI INCL CAD: CPT

## 2017-08-17 PROCEDURE — 99001 SPECIMEN HANDLING PT-LAB: CPT | Performed by: NURSE PRACTITIONER

## 2017-08-18 LAB
CHOLEST SERPL-MCNC: 168 MG/DL (ref 100–199)
HDLC SERPL-MCNC: 52 MG/DL
INTERPRETATION, 910389: NORMAL
LDLC SERPL CALC-MCNC: 92 MG/DL (ref 0–99)
TRIGL SERPL-MCNC: 119 MG/DL (ref 0–149)
VLDLC SERPL CALC-MCNC: 24 MG/DL (ref 5–40)

## 2017-10-18 ENCOUNTER — OFFICE VISIT (OUTPATIENT)
Dept: BEHAVIORAL/MENTAL HEALTH CLINIC | Age: 67
End: 2017-10-18

## 2017-10-18 ENCOUNTER — TELEPHONE (OUTPATIENT)
Dept: BEHAVIORAL/MENTAL HEALTH CLINIC | Age: 67
End: 2017-10-18

## 2017-10-18 VITALS
HEIGHT: 65 IN | DIASTOLIC BLOOD PRESSURE: 93 MMHG | WEIGHT: 188 LBS | HEART RATE: 85 BPM | SYSTOLIC BLOOD PRESSURE: 152 MMHG | OXYGEN SATURATION: 98 % | BODY MASS INDEX: 31.32 KG/M2

## 2017-10-18 DIAGNOSIS — F17.210 CIGARETTE NICOTINE DEPENDENCE WITHOUT COMPLICATION: ICD-10-CM

## 2017-10-18 DIAGNOSIS — F41.9 ANXIETY AND DEPRESSION: Primary | ICD-10-CM

## 2017-10-18 DIAGNOSIS — F32.A ANXIETY AND DEPRESSION: Primary | ICD-10-CM

## 2017-10-18 RX ORDER — TRAZODONE HYDROCHLORIDE 100 MG/1
100 TABLET ORAL
Qty: 30 TAB | Refills: 2 | Status: SHIPPED | OUTPATIENT
Start: 2017-10-18 | End: 2018-01-16 | Stop reason: SDUPTHER

## 2017-10-18 RX ORDER — BUPROPION HYDROCHLORIDE 300 MG/1
300 TABLET ORAL
Qty: 30 TAB | Refills: 2 | Status: SHIPPED | OUTPATIENT
Start: 2017-10-18 | End: 2018-01-16 | Stop reason: SDUPTHER

## 2017-10-18 RX ORDER — ALPRAZOLAM 1 MG/1
1 TABLET ORAL
Qty: 60 TAB | Refills: 2 | Status: SHIPPED | OUTPATIENT
Start: 2017-10-18 | End: 2018-01-29 | Stop reason: SDUPTHER

## 2017-10-18 NOTE — PROGRESS NOTES
Psychiatric Outpatient Progress Note    Account Number:  [id number]  Name: Gretta Coy    SUBJECTIVE:   CHIEF COMPLAINT:  Marcia Stone a 78 y.o. , AA female and was seen today for first follow-up of psychiatric condition and psychotropic medication management.       HPI:    Coretta reports the following psychiatric symptoms:  depression, anxiety and caregiver stress.  The symptoms have been present for years and are of moderate severity. The symptoms occur daily. Pt reports that she is doing better after her medications were adjusted in the last visit. She is eating less and has lost weight. Sleep is Ok with trazodone. Denies any psychosis or arlene. Reports her stress of caring for her  with dementia at home is overwhelming her. He is having medical appointments frequently as he is now worked up for kidney failure. He requires total care and demands her constant attention. He is falling a lot and she cannot pick him up as he is very heavy. She gets some help from her family. Her sons are asking her to place her  in a nursing home. She is reluctant.       Continues to smoke 1/2 ppd. Weight is gone down by 3 lbs. BP is high today.      Contributing factors include: caregiver burden.    Patient denies SI/HI/SIB.        Side Effects:  none        Fam/Soc Hx (from Inial Eval with updates):        REVIEW OF SYSTEMS:  Psychiatric:  depression, anxiety, stress  Appetite: declining  Sleep: good       OBJECTIVE:                 Mental Status exam: WNL except for      Sensorium  oriented to time, place and person   Relations cooperative and passive    Eye Contact    appropriate   Appearance:  age appropriate and casually dressed   Motor Behavior/Gait:  within normal limits   Speech:  normal pitch and normal volume   Thought Process: goal directed and logical   Thought Content free of delusions and free of hallucinations   Suicidal ideations none   Homicidal ideations none   Mood:  depressed Affect:  anxious   Memory recent  adequate   Memory remote:  adequate   Concentration:  adequate   Abstraction:  concrete   Insight:  fair   Reliability fair   Judgment:  fair       MEDICAL DECISION MAKING  Data: pertinent labs, imaging, medical records and diagnostic tests reviewed and incorporated in diagnosis and treatment plan    Allergies   Allergen Reactions    Citalopram Other (comments)     Not effective    Prozac [Fluoxetine] Anxiety     Irritable, restless        Current Outpatient Prescriptions   Medication Sig Dispense Refill    traZODone (DESYREL) 100 mg tablet Take 1 Tab by mouth nightly. 30 Tab 2    ALPRAZolam (XANAX) 1 mg tablet Take 1 Tab by mouth two (2) times daily as needed for Anxiety. Max Daily Amount: 2 mg. Indications: ANXIETY WITH DEPRESSION 60 Tab 2    buPROPion XL (WELLBUTRIN XL) 300 mg XL tablet Take 1 Tab by mouth daily (after breakfast). Indications: ANXIETY WITH DEPRESSION 30 Tab 2    ipratropium (ATROVENT) 0.06 % nasal spray 2 Sprays by Both Nostrils route four (4) times daily. 15 mL 0    amLODIPine-atorvastatin (CADUET) 10-80 mg per tablet Take 1 Tab by mouth daily. 30 Tab 11    icosapent ethyl (VASCEPA) 1 gram capsule Take 2 Caps by mouth two (2) times a day. 120 Cap 11    aspirin delayed-release 81 mg tablet Take 1 Tab by mouth daily. 30 Tab 11        Visit Vitals    BP (!) 152/93 (BP 1 Location: Left arm, BP Patient Position: Sitting)    Pulse 85    Ht 5' 5\" (1.651 m)    Wt 85.3 kg (188 lb)    SpO2 98%    BMI 31.28 kg/m2         Problems addressed today:    ICD-10-CM ICD-9-CM    1. Anxiety and depression F41.8 300.00 ALPRAZolam (XANAX) 1 mg tablet     311    2. Cigarette nicotine dependence without complication I10.991 016.0        Assessment:   Bailee Sebastian  is a 78 y.o.  female  is responding to treatment. Symptoms are stable. Patient denies SI/HI/SIB. No evidence of AH/VH or delusions.     Risk Scoring- chronic illnesses and prescription drug management    Treatment Plan:  1. Medications:          Medication Changes/Adjustments: Continue current combination of Wellbutrin, Xanax and trazodone. Current Outpatient Prescriptions   Medication Sig Dispense Refill    traZODone (DESYREL) 100 mg tablet Take 1 Tab by mouth nightly. 30 Tab 2    ALPRAZolam (XANAX) 1 mg tablet Take 1 Tab by mouth two (2) times daily as needed for Anxiety. Max Daily Amount: 2 mg. Indications: ANXIETY WITH DEPRESSION 60 Tab 2    buPROPion XL (WELLBUTRIN XL) 300 mg XL tablet Take 1 Tab by mouth daily (after breakfast). Indications: ANXIETY WITH DEPRESSION 30 Tab 2    ipratropium (ATROVENT) 0.06 % nasal spray 2 Sprays by Both Nostrils route four (4) times daily. 15 mL 0    amLODIPine-atorvastatin (CADUET) 10-80 mg per tablet Take 1 Tab by mouth daily. 30 Tab 11    icosapent ethyl (VASCEPA) 1 gram capsule Take 2 Caps by mouth two (2) times a day. 120 Cap 11    aspirin delayed-release 81 mg tablet Take 1 Tab by mouth daily. 27 Tab 11                  The following regarding medications was addressed:    (The risks and benefits of the proposed medication; the potential medication side effects ie    dry mouth, weight gain, GI upset, headache; patient given opportunity to ask questions)       2. Counseling and coordination of care including instructions for treatment, risks/benefits, risk factor reduction and patient/family education. She agrees with the plan. Patient instructed to call with any side effects, questions or issues. 3.  Follow-up Disposition:  Return in about 3 months (around 1/18/2018). 4. Pt was provided supportive counseling for her caregiver burden. Option of NH placement versus home care with home health care discussed. Pt was also counseled on smoking cessation.      PSYCHOTHERAPY:  approx 20 minutes  Type:  Supportive/Solution Focused psychotherapy provided  Focus:     Current problems:   Caregiver burden              smoking   Medical issues     Psychoeducation provided on psych medications    Treatment plan reviewed with patient-including diagnosis and medications    Sukhjinder Purdy is slowly progressing.     Smita Dee MD  10/18/2017

## 2018-01-10 ENCOUNTER — OFFICE VISIT (OUTPATIENT)
Dept: BEHAVIORAL/MENTAL HEALTH CLINIC | Age: 68
End: 2018-01-10

## 2018-01-10 VITALS
WEIGHT: 192 LBS | BODY MASS INDEX: 31.99 KG/M2 | HEIGHT: 65 IN | DIASTOLIC BLOOD PRESSURE: 84 MMHG | OXYGEN SATURATION: 98 % | HEART RATE: 83 BPM | SYSTOLIC BLOOD PRESSURE: 144 MMHG

## 2018-01-10 DIAGNOSIS — F41.9 ANXIETY AND DEPRESSION: Primary | ICD-10-CM

## 2018-01-10 DIAGNOSIS — F32.A ANXIETY AND DEPRESSION: Primary | ICD-10-CM

## 2018-01-10 DIAGNOSIS — F17.210 CIGARETTE NICOTINE DEPENDENCE WITHOUT COMPLICATION: ICD-10-CM
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
## 2018-01-10 NOTE — MR AVS SNAPSHOT
Visit Information Date & Time Provider Department Dept. Phone Encounter #  
 1/10/2018 10:40 AM Alex Ruby MD [facility] [phone] [id number] Follow-up Instructions Return in about 3 months (around 4/10/2018). Your Appointments 1/10/2018 10:40 AM  
ESTABLISHED PATIENT with Alex Ruby MD  
[facility]) Appt Note: 3 month follow-up [address] [facility] [address]  
[phone]  
  
   
 [place] [address]  
  
    
 1/18/2018  8:40 AM  
ROUTINE CARE with Anand Sharp NP  
[address] [facility]) Appt Note: HTN, LIPIDS , 6 MONTH FU  
 [address] [facility] [address] [place] [address] Dimitris Fatima [facility]  
  
    
 4/26/2018  9:20 AM  
Medicare Physical with Anand Sharp NP  
[address] [facility]) Appt Note: ANNUAL MEDICARE WELLNESS  
 [address] [facility] [address] [address]  
[phone]  
  
   
 [address] [place] [address] Upcoming Health Maintenance Date Due  
 GLAUCOMA SCREENING Q2Y 7/1/2017 Influenza Age 5 to Adult 8/1/2017 FOBT Q 1 YEAR AGE 50-75 1/17/2018 Pneumococcal 65+ Low/Medium Risk (2 of 2 - PPSV23) 1/17/2018 MEDICARE YEARLY EXAM 4/26/2018 BREAST CANCER SCRN MAMMOGRAM 8/17/2019 DTaP/Tdap/Td series (2 - Td) 6/26/2025 Allergies as of 1/10/2018  Review Complete On: 1/10/2018 By: Alex Ruby MD  
  
 Severity Noted Reaction Type Reactions Citalopram  04/20/2016   Intolerance Other (comments) Not effective Prozac [Fluoxetine]  04/20/2016   Intolerance Anxiety Irritable, restless Current Immunizations  Never Reviewed No immunizations on file. Not reviewed this visit You Were Diagnosed With   
  
 Codes Comments Anxiety and depression    -  Primary ICD-10-CM: F41.8 ICD-9-CM: 300.00, 311 Cigarette nicotine dependence without complication     GJQ-75-OJ: F17.210 ICD-9-CM: 305.1 Vitals BP Pulse Height(growth percentile) Weight(growth percentile) SpO2 BMI  
 144/84 (BP 1 Location: Left arm, BP Patient Position: Sitting) 83 5' 5\" (1.651 m) 192 lb (87.1 kg) 98% 31.95 kg/m2 OB Status Smoking Status Postmenopausal Current Every Day Smoker Vitals History BMI and BSA Data Body Mass Index Body Surface Area 31.95 kg/m 2 2 m 2 Preferred Pharmacy Pharmacy Name Phone [facility] [address] [facility] [phone] Your Updated Medication List  
  
   
This list is accurate as of: 1/10/18  9:36 AM.  Always use your most recent med list.  
  
  
  
  
 ALPRAZolam 1 mg tablet Commonly known as:  Nadia Grandchild Take 1 Tab by mouth two (2) times daily as needed for Anxiety. Max Daily Amount: 2 mg. Indications: ANXIETY WITH DEPRESSION  
  
 amLODIPine-atorvastatin 10-80 mg per tablet Commonly known as:  CADUET Take 1 Tab by mouth daily. aspirin delayed-release 81 mg tablet Take 1 Tab by mouth daily. buPROPion  mg XL tablet Commonly known as:  Marylou Mast Take 1 Tab by mouth daily (after breakfast). Indications: ANXIETY WITH DEPRESSION  
  
 icosapent ethyl 1 gram capsule Commonly known as:  VASCEPA Take 2 Caps by mouth two (2) times a day. ipratropium 42 mcg (0.06 %) nasal spray Commonly known as:  ATROVENT  
2 Sprays by Both Nostrils route four (4) times daily. traZODone 100 mg tablet Commonly known as:  Valerie Cali Take 1 Tab by mouth nightly. Follow-up Instructions Return in about 3 months (around 4/10/2018). Introducing [facility]! Val Panchal introduces [employer] patient portal. Now you can access parts of your medical record, email your doctor's office, and request medication refills online. 1. In your internet browser, go to [email or web address] 2. Click on the First Time User? Click Here link in the Sign In box. You will see the New Member Sign Up page. 3. Enter your [employer] Access Code exactly as it appears below. You will not need to use this code after youve completed the sign-up process. If you do not sign up before the expiration date, you must request a new code. · [employer] Access Code: [email or web address] Expires: 4/10/2018  9:36 AM 
 
4. Enter the last four digits of your Social Security Number (xxxx) and Date of Birth (mm/dd/yyyy) as indicated and click Submit. You will be taken to the next sign-up page. 5. Create a [employer] ID. This will be your [employer]Warp login ID and cannot be changed, so think of one that is secure and easy to remember. 6. Create a [employer] password. You can change your password at any time. 7. Enter your Password Reset Question and Answer. This can be used at a later time if you forget your password. 8. Enter your e-mail address. You will receive e-mail notification when new information is available in [address] 9. Click Sign Up. You can now view and download portions of your medical record. 10. Click the Download Summary menu link to download a portable copy of your medical information. If you have questions, please visit the Frequently Asked Questions section of the [employer] website. Remember, [employer] is NOT to be used for urgent needs. For medical emergencies, dial 911. Now available from your iPhone and Android! Please provide this summary of care documentation to your next provider. Your primary care clinician is listed as CHRISTEN Jean. If you have any questions after today's visit, please call [phone].

## 2018-01-16 DIAGNOSIS — F32.A ANXIETY AND DEPRESSION: ICD-10-CM

## 2018-01-16 DIAGNOSIS — F41.9 ANXIETY AND DEPRESSION: ICD-10-CM

## 2018-01-16 RX ORDER — TRAZODONE HYDROCHLORIDE 100 MG/1
100 TABLET ORAL
Qty: 30 TAB | Refills: 2 | Status: SHIPPED | OUTPATIENT
Start: 2018-01-16 | End: 2018-04-04 | Stop reason: SDUPTHER

## 2018-01-16 RX ORDER — BUPROPION HYDROCHLORIDE 300 MG/1
300 TABLET ORAL
Qty: 30 TAB | Refills: 2 | Status: SHIPPED | OUTPATIENT
Start: 2018-01-16 | End: 2018-04-04 | Stop reason: SDUPTHER

## 2018-01-29 ENCOUNTER — TELEPHONE (OUTPATIENT)
Dept: BEHAVIORAL/MENTAL HEALTH CLINIC | Age: 68
End: 2018-01-29

## 2018-01-29 ENCOUNTER — OFFICE VISIT (OUTPATIENT)
Dept: INTERNAL MEDICINE CLINIC | Age: 68
End: 2018-01-29

## 2018-01-29 VITALS
DIASTOLIC BLOOD PRESSURE: 78 MMHG | HEART RATE: 80 BPM | WEIGHT: 188 LBS | RESPIRATION RATE: 20 BRPM | HEIGHT: 62 IN | OXYGEN SATURATION: 98 % | BODY MASS INDEX: 34.6 KG/M2 | TEMPERATURE: 98.4 F | SYSTOLIC BLOOD PRESSURE: 142 MMHG

## 2018-01-29 DIAGNOSIS — F32.A ANXIETY AND DEPRESSION: ICD-10-CM

## 2018-01-29 DIAGNOSIS — F17.210 CIGARETTE NICOTINE DEPENDENCE WITHOUT COMPLICATION: ICD-10-CM

## 2018-01-29 DIAGNOSIS — E78.1 HYPERTRIGLYCERIDEMIA WITHOUT HYPERCHOLESTEROLEMIA: Primary | ICD-10-CM

## 2018-01-29 DIAGNOSIS — I10 ESSENTIAL HYPERTENSION WITH GOAL BLOOD PRESSURE LESS THAN 140/90: ICD-10-CM

## 2018-01-29 DIAGNOSIS — F41.9 ANXIETY AND DEPRESSION: ICD-10-CM

## 2018-01-29 DIAGNOSIS — R74.8 ELEVATED LIVER ENZYMES: ICD-10-CM

## 2018-01-29 DIAGNOSIS — Z23 ENCOUNTER FOR IMMUNIZATION: ICD-10-CM

## 2018-01-29 DIAGNOSIS — Z12.11 COLON CANCER SCREENING: ICD-10-CM

## 2018-01-29 RX ORDER — ALPRAZOLAM 1 MG/1
1 TABLET ORAL
Qty: 60 TAB | Refills: 2 | Status: SHIPPED | OUTPATIENT
Start: 2018-01-29 | End: 2018-04-04 | Stop reason: SDUPTHER

## 2018-01-29 NOTE — PROGRESS NOTES
Ricky Fall is a 80 y.o. female and presents with Hypertension and Cholesterol Problem    Subjective:  Pt presents to f/u elevated triglycerides. Continues modified diet, avoiding eggs. Taking meds daily as prescribed. Denies myalgias, slurring speech, unilateral weakness, and chest pain. A repeat fasting lipid profile was ordered. The patient does NOT use medications that may worsen dyslipidemias (corticosteroids, progestins, anabolic steroids, diuretics, beta-blockers, amiodarone, cyclosporine, olanzapine). The patient does try to exercise regularly. The patient is NOT known to have coexisting coronary artery disease. Taking Aspirin daily as prescribed/advised    Hypertension Review:  The patient has hypertension  Diet and Lifestyle: generally does try to follow a  low sodium diet, exercises sporadically   Home BP Monitoring: is not measured at home. Pertinent ROS: taking medications as instructed, no medication side effects noted. No TIA's, chest pain on exertion, dyspnea on exertion, or swelling of ankles. BP Readings from Last 3 Encounters:   01/29/18 142/78   01/10/18 144/84   10/18/17 (!) 152/93     Smoking cessation Review:  Patient presents to discuss smoking cessation. The patient currently is smoking 4 cigs/day. She has smoked for multiple  years. She has tried to quit multiple times in the past using cold turkey. Taking Wellbutrin BID.      Review of Systems  Constitutional: negative for fevers, chills, anorexia and weight loss  Respiratory:  negative for hemoptysis, dyspnea, and wheezing  CV:   negative for chest pain, palpitations, and lower extremity edema  GI:   negative for nausea, vomiting, diarrhea, abdominal pain, and melena  Endo:               negative for polyuria,polydipsia,polyphagia, and heat intolerance  Genitourinary: negative for frequency, urgency, dysuria, retention, and hematuria  Integument:  negative for rash, ulcerations, and pruritus  Hematologic:  negative for easy bruising and bleeding  Musculoskel: negative for arthralgias, muscle weakness,and joint pain/swelling  Neurological:  negative for headaches, dizziness, vertigo,and memory/gait problems  Behavl/Psych: negative for feelings of suicide, and mood changes    Past Medical History:   Diagnosis Date    Anxiety     Arthritis     Hypertension     Psychiatric disorder      History reviewed. No pertinent surgical history. Social History     Social History    Marital status:      Spouse name: N/A    Number of children: N/A    Years of education: N/A     Social History Main Topics    Smoking status: Current Every Day Smoker     Packs/day: 0.25    Smokeless tobacco: Never Used      Comment: down to 1/4 ppd from 1/2 ppd    Alcohol use No    Drug use: No    Sexual activity: Not Asked     Other Topics Concern    None     Social History Narrative     Family History   Problem Relation Age of Onset    No Known Problems Mother     No Known Problems Father      Current Outpatient Prescriptions   Medication Sig Dispense Refill    buPROPion XL (WELLBUTRIN XL) 300 mg XL tablet Take 1 Tab by mouth daily (after breakfast). Indications: ANXIETY WITH DEPRESSION 30 Tab 2    traZODone (DESYREL) 100 mg tablet Take 1 Tab by mouth nightly. 30 Tab 2    ALPRAZolam (XANAX) 1 mg tablet Take 1 Tab by mouth two (2) times daily as needed for Anxiety. Max Daily Amount: 2 mg. Indications: ANXIETY WITH DEPRESSION 60 Tab 2    ipratropium (ATROVENT) 0.06 % nasal spray 2 Sprays by Both Nostrils route four (4) times daily. 15 mL 0    amLODIPine-atorvastatin (CADUET) 10-80 mg per tablet Take 1 Tab by mouth daily. 30 Tab 11    icosapent ethyl (VASCEPA) 1 gram capsule Take 2 Caps by mouth two (2) times a day. 120 Cap 11    aspirin delayed-release 81 mg tablet Take 1 Tab by mouth daily.  30 Tab 11     Allergies   Allergen Reactions    Citalopram Other (comments)     Not effective    Prozac [Fluoxetine] Anxiety     Irritable, restless Objective:  Visit Vitals    /78 (BP 1 Location: Left arm, BP Patient Position: Sitting)    Pulse 80    Temp 98.4 °F (36.9 °C) (Oral)    Resp 20    Ht 5' 2\" (1.575 m)    Wt 188 lb (85.3 kg)    SpO2 98%    BMI 34.39 kg/m2     Physical Exam:   General appearance - alert, well appearing, and in no distress. Mental status - A/O x 4, normal mood and affect. Neck -Supple ,normal CSP. FROM, non-tender. No significant adenopathy/thyromegaly. No JVD. Chest - CTA. Symmetric chest rise. No wheezing, rales or rhonchi. Heart - Normal rate, regular rhythm. Normal S1, S2. No MGR or clicks. Abdomen - Soft,non-distended. Normoactive BS in all quadrants. NT, no mass or HSM. Ext- Radial, DP pulses, 2+ bilaterally. No pedal edema, clubbing, or cyanosis. Skin-Warm and dry. No hyperpigmentation, ulcerations, or suspicious lesions. Neuro - Normal speech, no focal findings or movement disorder. Normal strength, gait, and muscle tone. Assessment/Plan:  Discussed the patient's BMI with her. The BMI follow up plan is as follows:     I have reviewed/discussed the above normal BMI (Body mass index is 34.39 kg/(m^2). ) with the patient. I have recommended the following interventions: encourage exercise, monitor weight, and dietary management education, guidance, and counseling. See below for other orders   Follow-up Disposition:  Return in about 6 months (around 7/29/2018) for CHOL, BMI, HTN.      ICD-10-CM ICD-9-CM    1. Hypertriglyceridemia without hypercholesterolemia E78.1 272.1 LIPID PANEL   2. Essential hypertension with goal blood pressure less than 140/90 I10 401.9 CBC WITH AUTOMATED DIFF      METABOLIC PANEL, COMPREHENSIVE   3. Cigarette nicotine dependence without complication R75.341 197.8    4. Elevated liver enzymes D56.9 211.4 METABOLIC PANEL, COMPREHENSIVE   5. Colon cancer screening Z12.11 V76.51 OCCULT BLOOD, IMMUNOASSAY (FIT)   6.  BMI 34.0-34.9,adult Z68.34 V85.34      Orders Placed This Encounter    CBC WITH AUTOMATED DIFF    LIPID PANEL    METABOLIC PANEL, COMPREHENSIVE    OCCULT BLOOD, IMMUNOASSAY (FIT)       Carmen Urbina expressed understanding of plan. An After Visit Summary was offered/printed and given to the patient.

## 2018-01-29 NOTE — TELEPHONE ENCOUNTER
states pt last filled RX on 12/31, 60 tabs for 30 days. Last seen on 1/10/18, next appt scheduled for 4/4/18.

## 2018-01-29 NOTE — PATIENT INSTRUCTIONS
Aim to eat 3 meals and 1 snack. Try eating every 4 hours also. Healthy food choices. Healthy snacks:  Fruit- 1/2 cup per serving  Vegetables-1 cup per serving  Sugar-Free or Low-Carb branded snacks  Lean protein- chicken, turkey, fish, deer, organic-fat free beef (in moderation)  Jerky-beef, chicken, or turkey  Oatmeal    Drink mostly water, aiming for 1 gallon a day, but at least 10 glasses/day. May add lemon, lime, cucumber, or citrus fruit to water to help with digestion. Learning About Low-Carbohydrate Diets for Weight Loss  What is a low-carbohydrate diet? Low-carb diets avoid foods that are high in carbohydrate. These high-carb foods include pasta, bread, rice, cereal, fruits, and starchy vegetables. Instead, these diets usually have you eat foods that are high in fat and protein. Many people lose weight quickly on a low-carb diet. But the early weight loss is water. People on this diet often gain the weight back after they start eating carbs again. Not all diet plans are safe or work well. A lot of the evidence shows that low-carb diets aren't healthy. That's because these diets often don't include healthy foods like fruits and vegetables. Losing weight safely means balancing protein, fat, and carbs with every meal and snack. And low-carb diets don't always provide the vitamins, minerals, and fiber you need. If you have a serious medical condition, talk to your doctor before you try any diet. These conditions include kidney disease, heart disease, type 2 diabetes, high cholesterol, and high blood pressure. If you are pregnant, it may not be safe for your baby if you are on a low-carb diet. How can you lose weight safely? You might have heard that a diet plan helped another person lose weight. But that doesn't mean that it will work for you. It is very hard to stay on a diet that includes lots of big changes in your eating habits.  If you want to get to a healthy weight and stay there, making healthy lifestyle changes will often work better than dieting. These steps can help. · Make a plan for change. Work with your doctor to create a plan that is right for you. · See a dietitian. He or she can show you how to make healthy changes in your eating habits. · Manage stress. If you have a lot of stress in your life, it can be hard to focus on making healthy changes to your daily habits. · Track your food and activity. You are likely to do better at losing weight if you keep track of what you eat and what you do. Follow-up care is a key part of your treatment and safety. Be sure to make and go to all appointments, and call your doctor if you are having problems. It's also a good idea to know your test results and keep a list of the medicines you take. Where can you learn more? Go to http://shraddha-giles.info/. Enter A121 in the search box to learn more about \"Learning About Low-Carbohydrate Diets for Weight Loss. \"  Current as of: May 12, 2017  Content Version: 11.4  © 2063-5215 [employer]wise, [employer]orporated. Care instructions adapted under license by [employer] (which disclaims liability or warranty for this information). If you have questions about a medical condition or this instruction, always ask your healthcare professional. [address] any warranty or liability for your use of this information.

## 2018-01-29 NOTE — PROGRESS NOTES
1. Have you been to the ER, urgent care clinic since your last visit? Hospitalized since your last visit? No    2. Have you seen or consulted any other health care providers outside of the [address] [place] [address] since your last visit? Include any pap smears or colon screening. No.. Pneumovax injection verbal order TRISTIAN Rodriguez /S. Perlita Vega is a 80 y.o. female who presents for routine immunizations. She denies any symptoms , reactions or allergies that would exclude them from being immunized today. Risks and adverse reactions were discussed and the VIS was given to them. All questions were addressed. She was observed for 15 min post injection. There were no reactions observed.     Antonietta Francis LPN

## 2018-01-29 NOTE — MR AVS SNAPSHOT
303 [facility] 
 
 
 [address] [facility] [address] [address] 
[phone] Patient: Vicki English MRN: [id number] WB:5/47/2179 Visit Information Date & Time Provider Department Dept. Phone Encounter #  
 1/29/2018  8:40 AM Ernie Franco NP 0149 [facility] [phone] [id number] Follow-up Instructions Return in about 6 months (around 7/29/2018) for CHOL, BMI, HTN. Your Appointments 4/4/2018 10:00 AM  
ESTABLISHED PATIENT with Bigg Saul MD  
[facility] Appt Note: 3 month follow-up [address] [address]  
[phone]  
  
   
 [address] [place] [id number]  
  
    
 4/26/2018  9:20 AM  
Medicare Physical with Ernie Franco NP  
65[facility] Appt Note: ANNUAL MEDICARE WELLNESS  
 3314 [facility] [address] [address]  
[phone]  
  
   
 [address] Upcoming Health Maintenance Date Due  
 GLAUCOMA SCREENING Q2Y 4/29/2018* FOBT Q 1 YEAR AGE 50-75 4/29/2018* MEDICARE YEARLY EXAM 4/26/2018 BREAST CANCER SCRN MAMMOGRAM 8/17/2019 DTaP/Tdap/Td series (2 - Td) 6/26/2025 *Topic was postponed. The date shown is not the original due date. Allergies as of 1/29/2018  Review Complete On: 1/29/2018 By: Eli Woodard LPN Severity Noted Reaction Type Reactions Citalopram  04/20/2016   Intolerance Other (comments) Not effective Prozac [Fluoxetine]  04/20/2016   Intolerance Anxiety Irritable, restless Current Immunizations  Never Reviewed No immunizations on file. Not reviewed this visit You Were Diagnosed With   
  
 Codes Comments Hypertriglyceridemia without hypercholesterolemia    -  Primary ICD-10-CM: E78.1 ICD-9-CM: 272.1  Essential hypertension with goal blood pressure less than 140/90 ICD-10-CM: I10 
ICD-9-CM: 401.9 Cigarette nicotine dependence without complication     UZL-90-FK: F17.210 ICD-9-CM: 305.1 Elevated liver enzymes     ICD-10-CM: R74.8 ICD-9-CM: 790.5 Colon cancer screening     ICD-10-CM: Z12.11 ICD-9-CM: V76.51 BMI 34.0-34.9,adult     ICD-10-CM: C83.83 
ICD-9-CM: V85.34 Vitals BP Pulse Temp Resp Height(growth percentile) Weight(growth percentile) 142/78 (BP 1 Location: Left arm, BP Patient Position: Sitting) 80 98.4 °F (36.9 °C) (Oral) 20 5' 2\" (1.575 m) 188 lb (85.3 kg) SpO2 BMI OB Status Smoking Status 98% 34.39 kg/m2 Postmenopausal Current Every Day Smoker Vitals History BMI and BSA Data Body Mass Index Body Surface Area  
 34.39 kg/m 2 1.93 m 2 Preferred Pharmacy Pharmacy Name Phone Nasim Stapleton [address] [facility] [phone] Your Updated Medication List  
  
   
This list is accurate as of: 1/29/18  9:11 AM.  Always use your most recent med list.  
  
  
  
  
 ALPRAZolam 1 mg tablet Commonly known as:  Dayla Bake Take 1 Tab by mouth two (2) times daily as needed for Anxiety. Max Daily Amount: 2 mg. Indications: ANXIETY WITH DEPRESSION  
  
 amLODIPine-atorvastatin 10-80 mg per tablet Commonly known as:  CADUET Take 1 Tab by mouth daily. aspirin delayed-release 81 mg tablet Take 1 Tab by mouth daily. buPROPion  mg XL tablet Commonly known as:  Le Roy Silk Take 1 Tab by mouth daily (after breakfast). Indications: ANXIETY WITH DEPRESSION  
  
 icosapent ethyl 1 gram capsule Commonly known as:  VASCEPA Take 2 Caps by mouth two (2) times a day. ipratropium 42 mcg (0.06 %) nasal spray Commonly known as:  ATROVENT  
2 Sprays by Both Nostrils route four (4) times daily. traZODone 100 mg tablet Commonly known as:  Vallarie Meghann Take 1 Tab by mouth nightly. We Performed the Following CBC WITH AUTOMATED DIFF [26793 CPT(R)] LIPID PANEL [71648 CPT(R)] METABOLIC PANEL, COMPREHENSIVE [64350 CPT(R)] OCCULT BLOOD, IMMUNOASSAY (FIT) [id number] CPT(R)] Follow-up Instructions Return in about 6 months (around 7/29/2018) for CHOL, BMI, HTN. Patient Instructions Aim to eat 3 meals and 1 snack. Try eating every 4 hours also. Healthy food choices. Healthy snacks: 
Fruit- 1/2 cup per serving Vegetables-1 cup per serving Sugar-Free or Low-Carb branded snacks Lean protein- chicken, turkey, fish, deer, organic-fat free beef (in moderation) Jerky-beef, chicken, or turkey Oatmeal 
 
Drink mostly water, aiming for 1 gallon a day, but at least 10 glasses/day. May add lemon, lime, cucumber, or citrus fruit to water to help with digestion. Learning About Low-Carbohydrate Diets for Weight Loss What is a low-carbohydrate diet? Low-carb diets avoid foods that are high in carbohydrate. These high-carb foods include pasta, bread, rice, cereal, fruits, and starchy vegetables. Instead, these diets usually have you eat foods that are high in fat and protein. Many people lose weight quickly on a low-carb diet. But the early weight loss is water. People on this diet often gain the weight back after they start eating carbs again. Not all diet plans are safe or work well. A lot of the evidence shows that low-carb diets aren't healthy. That's because these diets often don't include healthy foods like fruits and vegetables. Losing weight safely means balancing protein, fat, and carbs with every meal and snack. And low-carb diets don't always provide the vitamins, minerals, and fiber you need. If you have a serious medical condition, talk to your doctor before you try any diet. These conditions include kidney disease, heart disease, type 2 diabetes, high cholesterol, and high blood pressure. If you are pregnant, it may not be safe for your baby if you are on a low-carb diet. How can you lose weight safely? You might have heard that a diet plan helped another person lose weight. But that doesn't mean that it will work for you. It is very hard to stay on a diet that includes lots of big changes in your eating habits. If you want to get to a healthy weight and stay there, making healthy lifestyle changes will often work better than dieting. These steps can help. · Make a plan for change. Work with your doctor to create a plan that is right for you. · See a dietitian. He or she can show you how to make healthy changes in your eating habits. · Manage stress. If you have a lot of stress in your life, it can be hard to focus on making healthy changes to your daily habits. · Track your food and activity. You are likely to do better at losing weight if you keep track of what you eat and what you do. Follow-up care is a key part of your treatment and safety. Be sure to make and go to all appointments, and call your doctor if you are having problems. It's also a good idea to know your test results and keep a list of the medicines you take. Where can you learn more? Go to http://shraddha-giles.info/. Enter A121 in the search box to learn more about \"Learning About Low-Carbohydrate Diets for Weight Loss. \" Current as of: May 12, 2017 Content Version: 11.4 © 7329-9069 Healthwise, Incorporated. Care instructions adapted under license by [employer] (which disclaims liability or warranty for this information). If you have questions about a medical condition or this instruction, always ask your healthcare professional. [address] [address] any warranty or liability for your use of this information. Introducing [facility] & HEALTH SERVICES! [facility] introduces judo patient portal. Now you can access parts of your medical record, email your doctor's office, and request medication refills online.    
 
1. In your internet browser, go to [email or web address] 2. Click on the First Time User? Click Here link in the Sign In box. You will see the New Member Sign Up page. 3. Enter your Scan & Target Access Code exactly as it appears below. You will not need to use this code after youve completed the sign-up process. If you do not sign up before the expiration date, you must request a new code. · Scan & Target Access Code: [email or web address] Expires: 4/10/2018  9:36 AM 
 
4. Enter the last four digits of your Social Security Number (xxxx) and Date of Birth (mm/dd/yyyy) as indicated and click Submit. You will be taken to the next sign-up page. 5. Create a userADgentst ID. This will be your Scan & Target login ID and cannot be changed, so think of one that is secure and easy to remember. 6. Create a Scan & Target password. You can change your password at any time. 7. Enter your Password Reset Question and Answer. This can be used at a later time if you forget your password. 8. Enter your e-mail address. You will receive e-mail notification when new information is available in [address] 9. Click Sign Up. You can now view and download portions of your medical record. 10. Click the Download Summary menu link to download a portable copy of your medical information. If you have questions, please visit the Frequently Asked Questions section of the Scan & Target website. Remember, Scan & Target is NOT to be used for urgent needs. For medical emergencies, dial 911. Now available from your iPhone and Android! Please provide this summary of care documentation to your next provider. Your primary care clinician is listed as CHRISTEN Johnson. If you have any questions after today's visit, please call [phone].

## 2018-01-30 LAB
ALBUMIN SERPL-MCNC: 4.2 G/DL (ref 3.6–4.8)
ALBUMIN/GLOB SERPL: 1.4 {RATIO} (ref 1.2–2.2)
ALP SERPL-CCNC: 164 IU/L (ref 39–117)
ALT SERPL-CCNC: 9 IU/L (ref 0–32)
AST SERPL-CCNC: 12 IU/L (ref 0–40)
BASOPHILS # BLD AUTO: 0 X10E3/UL (ref 0–0.2)
BASOPHILS NFR BLD AUTO: 1 %
BILIRUB SERPL-MCNC: 0.3 MG/DL (ref 0–1.2)
BUN SERPL-MCNC: 10 MG/DL (ref 8–27)
BUN/CREAT SERPL: 12 (ref 12–28)
CALCIUM SERPL-MCNC: 9.9 MG/DL (ref 8.7–10.3)
CHLORIDE SERPL-SCNC: 104 MMOL/L (ref 96–106)
CHOLEST SERPL-MCNC: 187 MG/DL (ref 100–199)
CO2 SERPL-SCNC: 24 MMOL/L (ref 18–29)
CREAT SERPL-MCNC: 0.84 MG/DL (ref 0.57–1)
EOSINOPHIL # BLD AUTO: 0.1 X10E3/UL (ref 0–0.4)
EOSINOPHIL NFR BLD AUTO: 2 %
ERYTHROCYTE [DISTWIDTH] IN BLOOD BY AUTOMATED COUNT: 14.5 % (ref 12.3–15.4)
GFR SERPLBLD CREATININE-BSD FMLA CKD-EPI: 72 ML/MIN/1.73
GFR SERPLBLD CREATININE-BSD FMLA CKD-EPI: 83 ML/MIN/1.73
GLOBULIN SER CALC-MCNC: 3 G/DL (ref 1.5–4.5)
GLUCOSE SERPL-MCNC: 91 MG/DL (ref 65–99)
HCT VFR BLD AUTO: 38.9 % (ref 34–46.6)
HDLC SERPL-MCNC: 54 MG/DL
HGB BLD-MCNC: 12 G/DL (ref 11.1–15.9)
IMM GRANULOCYTES # BLD: 0 X10E3/UL (ref 0–0.1)
IMM GRANULOCYTES NFR BLD: 0 %
INTERPRETATION, 910389: NORMAL
LDLC SERPL CALC-MCNC: 107 MG/DL (ref 0–99)
LYMPHOCYTES # BLD AUTO: 2.8 X10E3/UL (ref 0.7–3.1)
LYMPHOCYTES NFR BLD AUTO: 46 %
MCH RBC QN AUTO: 27.5 PG (ref 26.6–33)
MCHC RBC AUTO-ENTMCNC: 30.8 G/DL (ref 31.5–35.7)
MCV RBC AUTO: 89 FL (ref 79–97)
MONOCYTES # BLD AUTO: 0.4 X10E3/UL (ref 0.1–0.9)
MONOCYTES NFR BLD AUTO: 6 %
NEUTROPHILS # BLD AUTO: 2.7 X10E3/UL (ref 1.4–7)
NEUTROPHILS NFR BLD AUTO: 45 %
PLATELET # BLD AUTO: 277 X10E3/UL (ref 150–379)
POTASSIUM SERPL-SCNC: 4.3 MMOL/L (ref 3.5–5.2)
PROT SERPL-MCNC: 7.2 G/DL (ref 6–8.5)
RBC # BLD AUTO: 4.36 X10E6/UL (ref 3.77–5.28)
SODIUM SERPL-SCNC: 146 MMOL/L (ref 134–144)
TRIGL SERPL-MCNC: 132 MG/DL (ref 0–149)
VLDLC SERPL CALC-MCNC: 26 MG/DL (ref 5–40)
WBC # BLD AUTO: 6.1 X10E3/UL (ref 3.4–10.8)

## 2018-03-25 LAB
HEMOCCULT STL QL IA: NEGATIVE
PLEASE NOTE:, 188601: NORMAL

## 2018-04-04 ENCOUNTER — OFFICE VISIT (OUTPATIENT)
Dept: BEHAVIORAL/MENTAL HEALTH CLINIC | Age: 68
End: 2018-04-04

## 2018-04-04 VITALS
HEIGHT: 62 IN | DIASTOLIC BLOOD PRESSURE: 85 MMHG | BODY MASS INDEX: 35.51 KG/M2 | SYSTOLIC BLOOD PRESSURE: 154 MMHG | WEIGHT: 193 LBS | HEART RATE: 75 BPM

## 2018-04-04 DIAGNOSIS — E66.01 SEVERE OBESITY (BMI 35.0-39.9) WITH COMORBIDITY (HCC): ICD-10-CM

## 2018-04-04 DIAGNOSIS — F17.210 CIGARETTE NICOTINE DEPENDENCE WITHOUT COMPLICATION: ICD-10-CM

## 2018-04-04 DIAGNOSIS — F41.9 ANXIETY AND DEPRESSION: Primary | ICD-10-CM

## 2018-04-04 DIAGNOSIS — F32.A ANXIETY AND DEPRESSION: Primary | ICD-10-CM

## 2018-04-04 RX ORDER — TRAZODONE HYDROCHLORIDE 100 MG/1
100 TABLET ORAL
Qty: 30 TAB | Refills: 2 | Status: SHIPPED | OUTPATIENT
Start: 2018-04-04 | End: 2018-07-02 | Stop reason: SDUPTHER

## 2018-04-04 RX ORDER — BUPROPION HYDROCHLORIDE 300 MG/1
300 TABLET ORAL
Qty: 30 TAB | Refills: 2 | Status: SHIPPED | OUTPATIENT
Start: 2018-04-04 | End: 2018-07-02 | Stop reason: SDUPTHER

## 2018-04-04 RX ORDER — ALPRAZOLAM 1 MG/1
1 TABLET ORAL
Qty: 60 TAB | Refills: 2 | Status: SHIPPED | OUTPATIENT
Start: 2018-04-28 | End: 2018-07-02 | Stop reason: SDUPTHER

## 2018-04-04 NOTE — PROGRESS NOTES
Psychiatric Outpatient Progress Note    Account Number:  [id number]  Name: Jayjay Rodrigues    SUBJECTIVE:   CHIEF COMPLAINT:  Marie Sahu a 80 y.o. , AA female and was seen today for 3 month follow-up of psychiatric condition and psychotropic medication management.       HPI:    Coretta reports the following psychiatric symptoms:  depression, anxiety and caregiver stress.  The symptoms have been present for years and are of moderate severity. The symptoms occur daily.      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 Sleep is Ok with trazodone. Denies any psychosis or arlene. Reports compliance with medications.      Today, she reports that her  has been diagnosed with end-stage renal disease and will require dialysis. He had his fistula done to started dialysis from next week. 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 His doctor showed her his brain scan which shows very small brain due to the substance use for 25 years. He continues to be very lazy and does not want to do anything and because her name all the time for help.       Continues to smoke 1/2-1 ppd. Weight is stably high. BMI = 35.  BP is high today.      Contributing factors include: caregiver burden.    Patient denies SI/HI/SIB.        Side Effects:  none        Fam/Soc Hx (from Inial Eval with updates):       REVIEW OF SYSTEMS:  Constitutional: positive for fatigue and weight gain  Eyes: positive for contacts/glasses  Ears, nose, mouth, throat, and face: negative for hearing loss and tinnitus  Respiratory: positive for cough  Neurological: negative for headaches and seizures  Behavioral/Psych: positive for anxiety, negative for SI or HI  Appetite: declining  Sleep: good    Visit Vitals    /85    Pulse 75    Ht 5' 2\" (1.575 m)    Wt 87.5 kg (193 lb)    BMI 35.3 kg/m2       OBJECTIVE:                 Mental Status exam: WNL except for      Sensorium  oriented to time, place and person   Relations cooperative and passive    Eye Contact    appropriate   Appearance:  age appropriate and casually dressed   Motor Behavior/Gait:  within normal limits   Speech:  normal pitch and normal volume   Thought Process: goal directed and logical   Thought Content free of delusions and free of hallucinations   Suicidal ideations none   Homicidal ideations none   Mood:  euthymic   Affect:  anxious   Memory recent  adequate   Memory remote:  adequate   Concentration:  impaired   Abstraction:  concrete   Insight:  fair   Reliability fair   Judgment:  fair       MEDICAL DECISION MAKING  Data: pertinent labs, imaging, medical records and diagnostic tests reviewed and incorporated in diagnosis and treatment plan    Allergies   Allergen Reactions    Citalopram Other (comments)     Not effective    Prozac [Fluoxetine] Anxiety     Irritable, restless        Current Outpatient Prescriptions   Medication Sig Dispense Refill    [START ON 4/28/2018] ALPRAZolam (XANAX) 1 mg tablet Take 1 Tab by mouth two (2) times daily as needed for Anxiety. Max Daily Amount: 2 mg. Indications: ANXIETY WITH DEPRESSION 60 Tab 2    buPROPion XL (WELLBUTRIN XL) 300 mg XL tablet Take 1 Tab by mouth daily (after breakfast). Indications: ANXIETY WITH DEPRESSION 30 Tab 2    traZODone (DESYREL) 100 mg tablet Take 1 Tab by mouth nightly. 30 Tab 2    ipratropium (ATROVENT) 0.06 % nasal spray 2 Sprays by Both Nostrils route four (4) times daily. 15 mL 0    amLODIPine-atorvastatin (CADUET) 10-80 mg per tablet Take 1 Tab by mouth daily. 30 Tab 11    icosapent ethyl (VASCEPA) 1 gram capsule Take 2 Caps by mouth two (2) times a day. 120 Cap 11    aspirin delayed-release 81 mg tablet Take 1 Tab by mouth daily. 30 Tab 11          Problems addressed today:    ICD-10-CM ICD-9-CM    1. Anxiety and depression F41.9 300.00 ALPRAZolam (XANAX) 1 mg tablet    F32.9 311    2. Cigarette nicotine dependence without complication T66.431 239.0    3. Severe obesity (BMI 35.0-39. 9) with comorbidity ([facility] E66.01 278.01        Assessment:   Fidel Gross  is a 80 y.o.  female  is responding to treatment. Symptoms are stable. Patient denies SI/HI/SIB. No evidence of AH/VH or delusions. Risk Scoring- chronic illnesses and prescription drug management    Treatment Plan:  1. Medications:          Medication Changes/Adjustments: Continue current combination of Wellbutrin, trazodone and Xanax. Current Outpatient Prescriptions   Medication Sig Dispense Refill    [START ON 4/28/2018] ALPRAZolam (XANAX) 1 mg tablet Take 1 Tab by mouth two (2) times daily as needed for Anxiety. Max Daily Amount: 2 mg. Indications: ANXIETY WITH DEPRESSION 60 Tab 2    buPROPion XL (WELLBUTRIN XL) 300 mg XL tablet Take 1 Tab by mouth daily (after breakfast). Indications: ANXIETY WITH DEPRESSION 30 Tab 2    traZODone (DESYREL) 100 mg tablet Take 1 Tab by mouth nightly. 30 Tab 2    ipratropium (ATROVENT) 0.06 % nasal spray 2 Sprays by Both Nostrils route four (4) times daily. 15 mL 0    amLODIPine-atorvastatin (CADUET) 10-80 mg per tablet Take 1 Tab by mouth daily. 30 Tab 11    icosapent ethyl (VASCEPA) 1 gram capsule Take 2 Caps by mouth two (2) times a day. 120 Cap 11    aspirin delayed-release 81 mg tablet Take 1 Tab by mouth daily. 27 Tab 11                  The following regarding medications was addressed:    (The risks and benefits of the proposed medication; the potential medication side effects ie    dry mouth, weight gain, GI upset, headache; patient given opportunity to ask questions)       2. Counseling and coordination of care including instructions for treatment, risks/benefits, risk factor reduction and patient/family education. She agrees with the plan. Patient instructed to call with any side effects, questions or issues.      3.  Patient was provided supportive counseling for her caregiver burden. She was also educated on smoking cessation. Patient was a strongly recommended to seek help from her family members now more than before as her  will need transportation 3 times a week for dialysis. PSYCHOTHERAPY:  approx 20 minutes  Type:  Supportive/Cognitive Behavioral/Solution Focused psychotherapy provided  Focus:     Current problems:   Caregiver burden   Medical issues     Psychoeducation provided:  Psych medications. Treatment plan reviewed with patient-including diagnosis and medications    Carine Marilou is progressing. Follow-up Disposition:  Return in about 3 months (around 7/4/2018).       Jase Anderson MD  4/4/2018

## 2018-04-04 NOTE — MR AVS SNAPSHOT
303 [facility] 
 
 
 [address] [facility] [address] [facility] 
[phone] Patient: Ruby Prater MRN: [id number] ZBJ:[id number] Visit Information Date & Time Provider Department Dept. Phone Encounter #  
 4/4/2018 10:00 AM Ivan Felder MD [facility] [phone] [id number] Follow-up Instructions Return in about 3 months (around 7/4/2018). Follow-up and Disposition History Your Appointments 4/26/2018  9:20 AM  
Medicare Physical with Mesha Henning NP  
[address]) Appt Note: ANNUAL MEDICARE WELLNESS  
 3314 [facility] 57  
[phone]  
  
   
 [address]  
  
    
 7/2/2018 11:00 AM  
ESTABLISHED PATIENT with Ivan Felder MD  
[facility] [address] [place] [address]) Appt Note: 3 month follow-up [address] [place] [address] [address] [facility] [address]  
[phone]  
  
   
 [address] [facility] [address]  
  
    
 7/30/2018  8:40 AM  
ROUTINE CARE with Mesha Henning NP  
[address]) Appt Note: cho, BMI and htn 3314 [facility] [address] [address]  
[phone]  
  
   
 [address] [facility] Upcoming Health Maintenance Date Due  
 GLAUCOMA SCREENING Q2Y 4/29/2018* MEDICARE YEARLY EXAM 4/26/2018 FOBT Q 1 YEAR AGE 50-75 3/23/2019 BREAST CANCER SCRN MAMMOGRAM 8/17/2019 DTaP/Tdap/Td series (2 - Td) 6/26/2025 *Topic was postponed. The date shown is not the original due date. Allergies as of 4/4/2018  Review Complete On: 4/4/2018 By: Ivan Felder MD  
  
 Severity Noted Reaction Type Reactions Citalopram  04/20/2016   Intolerance Other (comments) Not effective Prozac [Fluoxetine]  04/20/2016   Intolerance Anxiety Irritable, restless Current Immunizations  Reviewed on 1/29/2018 Name Date Pneumococcal Polysaccharide (PPSV-23) 1/29/2018 Not reviewed this visit You Were Diagnosed With   
  
 Codes Comments Anxiety and depression    -  Primary ICD-10-CM: F41.9, F32.9 ICD-9-CM: 300.00, 311 Cigarette nicotine dependence without complication     XYG-96-ZO: F17.210 ICD-9-CM: 305.1 Severe obesity (BMI 35.0-39. 9) with comorbidity ([facility]     ICD-10-CM: E66.01 
ICD-9-CM: 278.01 Vitals BP Pulse Height(growth percentile) Weight(growth percentile) BMI OB Status 154/85 75 5' 2\" (1.575 m) 193 lb (87.5 kg) 35.3 kg/m2 Postmenopausal  
 Smoking Status Current Every Day Smoker Vitals History BMI and BSA Data Body Mass Index Body Surface Area  
 35.3 kg/m 2 1.96 m 2 Preferred Pharmacy Pharmacy Name Phone [facility] [address] [facility] [phone] Your Updated Medication List  
  
   
This list is accurate as of 4/4/18 11:59 PM.  Always use your most recent med list.  
  
  
  
  
 ALPRAZolam 1 mg tablet Commonly known as:  Darus Breeding Take 1 Tab by mouth two (2) times daily as needed for Anxiety. Max Daily Amount: 2 mg. Indications: ANXIETY WITH DEPRESSION Start taking on:  4/28/2018  
  
 amLODIPine-atorvastatin 10-80 mg per tablet Commonly known as:  CADUET Take 1 Tab by mouth daily. aspirin delayed-release 81 mg tablet Take 1 Tab by mouth daily. buPROPion  mg XL tablet Commonly known as:  Berta Erm Take 1 Tab by mouth daily (after breakfast). Indications: ANXIETY WITH DEPRESSION  
  
 icosapent ethyl 1 gram capsule Commonly known as:  VASCEPA Take 2 Caps by mouth two (2) times a day. ipratropium 42 mcg (0.06 %) nasal spray Commonly known as:  ATROVENT  
2 Sprays by Both Nostrils route four (4) times daily. traZODone 100 mg tablet Commonly known as:   [place] Take 1 Tab by mouth nightly. Prescriptions Printed Refills ALPRAZolam (XANAX) 1 mg tablet 2 Starting on: 4/28/2018 Sig: Take 1 Tab by mouth two (2) times daily as needed for Anxiety. Max Daily Amount: 2 mg. Indications: ANXIETY WITH DEPRESSION Class: Print Route: Oral  
  
Prescriptions Sent to Pharmacy Refills buPROPion XL (WELLBUTRIN XL) 300 mg XL tablet 2 Sig: Take 1 Tab by mouth daily (after breakfast). Indications: ANXIETY WITH DEPRESSION Class: Normal  
 Pharmacy: [facility] [address] [facility] #: [phone] Route: Oral  
 traZODone (DESYREL) 100 mg tablet 2 Sig: Take 1 Tab by mouth nightly. Class: Normal  
 Pharmacy: [address] Ph #: [phone] Route: Oral  
  
Follow-up Instructions Return in about 3 months (around 7/4/2018). Introducing [facility]! Ishmael Padron introduces Tablo patient portal. Now you can access parts of your medical record, email your doctor's office, and request medication refills online. 1. In your internet browser, go to [email or web address] 2. Click on the First Time User? Click Here link in the Sign In box. You will see the New Member Sign Up page. 3. Enter your Tablo Access Code exactly as it appears below. You will not need to use this code after youve completed the sign-up process. If you do not sign up before the expiration date, you must request a new code. · Tablo Access Code: [email or web address] Expires: 4/10/2018 10:36 AM 
 
4. Enter the last four digits of your Social Security Number (xxxx) and Date of Birth (mm/dd/yyyy) as indicated and click Submit. You will be taken to the next sign-up page. 5. Create a Xconomyt ID. This will be your Tablo login ID and cannot be changed, so think of one that is secure and easy to remember. 6. Create a uShip password. You can change your password at any time. 7. Enter your Password Reset Question and Answer. This can be used at a later time if you forget your password. 8. Enter your e-mail address. You will receive e-mail notification when new information is available in [address] 9. Click Sign Up. You can now view and download portions of your medical record. 10. Click the Download Summary menu link to download a portable copy of your medical information. If you have questions, please visit the Frequently Asked Questions section of the uShip website. Remember, uShip is NOT to be used for urgent needs. For medical emergencies, dial 911. Now available from your iPhone and Android! Please provide this summary of care documentation to your next provider. Your primary care clinician is listed as CHRISTEN Kapadia. If you have any questions after today's visit, please call [phone].

## 2018-04-26 ENCOUNTER — OFFICE VISIT (OUTPATIENT)
Dept: INTERNAL MEDICINE CLINIC | Age: 68
End: 2018-04-26

## 2018-04-26 VITALS
SYSTOLIC BLOOD PRESSURE: 140 MMHG | OXYGEN SATURATION: 95 % | RESPIRATION RATE: 18 BRPM | HEIGHT: 62 IN | TEMPERATURE: 97.2 F | WEIGHT: 191.4 LBS | HEART RATE: 81 BPM | BODY MASS INDEX: 35.22 KG/M2 | DIASTOLIC BLOOD PRESSURE: 65 MMHG

## 2018-04-26 DIAGNOSIS — Z00.00 MEDICARE ANNUAL WELLNESS VISIT, SUBSEQUENT: ICD-10-CM

## 2018-04-26 DIAGNOSIS — Z71.89 ADVANCE CARE PLANNING: ICD-10-CM

## 2018-04-26 DIAGNOSIS — F17.210 CIGARETTE NICOTINE DEPENDENCE WITHOUT COMPLICATION: ICD-10-CM

## 2018-04-26 DIAGNOSIS — E66.01 SEVERE OBESITY (BMI 35.0-39.9) WITH COMORBIDITY (HCC): Primary | ICD-10-CM

## 2018-04-26 DIAGNOSIS — F41.9 ANXIETY AND DEPRESSION: ICD-10-CM

## 2018-04-26 DIAGNOSIS — E78.1 HYPERTRIGLYCERIDEMIA WITHOUT HYPERCHOLESTEROLEMIA: ICD-10-CM

## 2018-04-26 DIAGNOSIS — F32.A ANXIETY AND DEPRESSION: ICD-10-CM

## 2018-04-26 DIAGNOSIS — I10 ESSENTIAL HYPERTENSION WITH GOAL BLOOD PRESSURE LESS THAN 140/90: ICD-10-CM

## 2018-04-26 NOTE — PROGRESS NOTES
Chief Complaint   Patient presents with    Follow-up     Chol BMI HTN     1. Have you been to the ER, urgent care clinic since your last visit? Hospitalized since your last visit? No    2. Have you seen or consulted any other health care providers outside of the [facility] since your last visit? Include any pap smears or colon screening. No    Pt denies pain at this time.

## 2018-04-26 NOTE — PROGRESS NOTES
Yanet Avalos is a 80 y.o. female and presents with Follow-up (Chol BMI HTN)    Subjective:  Pt presents today for MWV and to f/u chol/htn/smoking cessation. H/o elevated triglycerides. Continues modified diet, avoiding eggs. Taking meds daily as prescribed. Denies myalgias, slurring speech, unilateral weakness, and chest pain. A repeat fasting lipid profile was NOT ordered. The patient does NOT use medications that may worsen dyslipidemias (corticosteroids, progestins, anabolic steroids, diuretics, beta-blockers, amiodarone, cyclosporine, olanzapine). The patient does try to exercise regularly. The patient is NOT known to have coexisting coronary artery disease. Taking Aspirin daily as prescribed/advised    Hypertension Review:  The patient has hypertension  Diet and Lifestyle: generally does try to follow a  low sodium diet, exercises by walking  Home BP Monitoring: is not measured at home. Pertinent ROS: taking medications as instructed, no medication side effects noted. No TIA's, chest pain on exertion, dyspnea on exertion, or swelling of ankles. Continues to smoke ~ 4 cigs/day, taking Wellbutrin.   BP Readings from Last 3 Encounters:   04/26/18 140/65   04/04/18 154/85   01/29/18 142/78     Review of Systems  Constitutional: negative for fevers, chills, anorexia and weight loss  Respiratory:  negative for hemoptysis, dyspnea, and wheezing  CV:   negative for chest pain, palpitations, and lower extremity edema  GI:   negative for nausea, vomiting, diarrhea, abdominal pain, and melena  Endo:               negative for polyuria,polydipsia,polyphagia, and heat intolerance  Genitourinary: negative for frequency, urgency, dysuria, retention, and hematuria  Integument:  negative for rash, ulcerations, and pruritus  Hematologic:  negative for easy bruising and bleeding  Musculoskel: negative for arthralgias, muscle weakness,and joint pain/swelling  Neurological:  negative for headaches, dizziness, vertigo,and memory/gait problems  Behavl/Psych: negative for feelings of suicide, and mood changes    Past Medical History:   Diagnosis Date    Anxiety     Arthritis     Hypertension     Psychiatric disorder      History reviewed. No pertinent surgical history. Social History     Social History    Marital status:      Spouse name: N/A    Number of children: N/A    Years of education: N/A     Social History Main Topics    Smoking status: Current Every Day Smoker     Packs/day: 0.25    Smokeless tobacco: Never Used      Comment: down to 1/4 ppd from 1/2 ppd    Alcohol use No    Drug use: No    Sexual activity: Not Asked     Other Topics Concern    None     Social History Narrative     Family History   Problem Relation Age of Onset    No Known Problems Mother     No Known Problems Father      Current Outpatient Prescriptions   Medication Sig Dispense Refill    [START ON 4/28/2018] ALPRAZolam (XANAX) 1 mg tablet Take 1 Tab by mouth two (2) times daily as needed for Anxiety. Max Daily Amount: 2 mg. Indications: ANXIETY WITH DEPRESSION 60 Tab 2    buPROPion XL (WELLBUTRIN XL) 300 mg XL tablet Take 1 Tab by mouth daily (after breakfast). Indications: ANXIETY WITH DEPRESSION 30 Tab 2    traZODone (DESYREL) 100 mg tablet Take 1 Tab by mouth nightly. 30 Tab 2    ipratropium (ATROVENT) 0.06 % nasal spray 2 Sprays by Both Nostrils route four (4) times daily. 15 mL 0    amLODIPine-atorvastatin (CADUET) 10-80 mg per tablet Take 1 Tab by mouth daily. 30 Tab 11    icosapent ethyl (VASCEPA) 1 gram capsule Take 2 Caps by mouth two (2) times a day. 120 Cap 11    aspirin delayed-release 81 mg tablet Take 1 Tab by mouth daily.  30 Tab 11     Allergies   Allergen Reactions    Citalopram Other (comments)     Not effective    Prozac [Fluoxetine] Anxiety     Irritable, restless       Objective:  Visit Vitals    /65 (BP 1 Location: Left arm, BP Patient Position: Sitting)    Pulse 81    Temp 97.2 °F (36.2 °C) (Oral)    Ht 5' 2\" (1.575 m)    Wt 191 lb 6.4 oz (86.8 kg)    BMI 35.01 kg/m2     Physical Exam:   General appearance - alert, well appearing, and in no distress. Mental status - A/O x 4, normal mood and affect. Neck -Supple ,normal CSP. FROM, non-tender. No significant adenopathy/thyromegaly. No JVD. Chest - CTA. Symmetric chest rise. No wheezing, rales or rhonchi. Heart - Normal rate, regular rhythm. Normal S1, S2. No MGR or clicks. Abdomen - Soft,non-distended. Normoactive BS in all quadrants. NT, no mass or HSM. Ext- Radial, DP pulses, 2+ bilaterally. No pedal edema, clubbing, or cyanosis. Skin-Warm and dry. No hyperpigmentation, ulcerations, or suspicious lesions. Neuro - Normal speech, no focal findings or movement disorder. Normal strength, gait, and muscle tone. Assessment of cognitive impairment: Alert and oriented x 4    Depression Screen:   PHQ over the last two weeks 4/26/2018   PHQ Not Done -   Little interest or pleasure in doing things Not at all   Feeling down, depressed or hopeless Not at all   Total Score PHQ 2 0       Fall Risk Assessment:    Fall Risk Assessment, last 12 mths 4/26/2018   Able to walk? Yes   Fall in past 12 months? No       Abuse Assessment: Patient NOT domesticly abuse (verbal, physical, and financial)    Current Alcohol use: no   reports that she does not drink alcohol. Functional Ability:   Does the patient exhibit a steady gait? Yes   How long did it take the patient to get up and walk from a sitting position? 4 seconds   Is the patient self reliant?  (ie can do own laundry, meals, household chores) yes     Does the patient handle his/her own medications? yes     Does the patient handle his/her own money? Yes     Is the patients home safe (ie good lighting, handrails on stairs and bath, etc.)? Yes   Did you notice or did patient express any hearing difficulties?    no   Did you notice of did patient express any vision difficulties?  no   Were distance and reading eye charts used? n/a     Advance Care Planning:   Patient was offered the opportunity to discuss advance care planning:  Yes   Does patient have an Advance Directive: No   If no, did you provide information and forms? yes     Health Maintenance   Topic Date Due    GLAUCOMA SCREENING Q2Y  04/29/2018 (Originally 7/1/2017)    FOBT Q 1 YEAR AGE 50-75  03/23/2019    MEDICARE YEARLY EXAM  04/27/2019    BREAST CANCER SCRN MAMMOGRAM  08/17/2019    DTaP/Tdap/Td series (2 - Td) 06/26/2025    Hepatitis C Screening  Completed    Bone Densitometry (Dexa) Screening  Completed    ZOSTER VACCINE AGE 60>  Addressed    Pneumococcal 65+ Low/Medium Risk  Completed    Influenza Age 5 to Adult  Addressed       Assessment/Plan:  Discussed the patient's BMI with her. The BMI follow up plan is as follows:     I have reviewed/discussed the above normal BMI (Body mass index is 35.01 kg/(m^2). ) with the patient. I have recommended the following interventions: encourage exercise, monitor weight, and dietary management education, guidance, and counseling. See below for other orders   Follow-up Disposition:  Return in about 3 months (around 7/26/2018) for CHOL labs, HTN, BMI. ICD-10-CM ICD-9-CM    1. Severe obesity (BMI 35.0-39. 9) with comorbidity ([facility] [address] E66.01 278.01    2. Medicare annual wellness visit, subsequent Z00.00 V70.0    3. Advance care planning Z71.89 V65.49    4. Anxiety and depression F41.9 300.00     F32.9 311    5. Cigarette nicotine dependence without complication J01.621 427.1    6. Hypertriglyceridemia without hypercholesterolemia E78.1 272.1    7. Essential hypertension with goal blood pressure less than 140/90 I10 401.9      No orders of the defined types were placed in this encounter. Taina Lizeth expressed understanding of plan. An After Visit Summary was offered/printed and given to the patient.

## 2018-04-26 NOTE — ACP (ADVANCE CARE PLANNING)
Advanced care planning- discussed Advance directive, Medical POA, and life sustaining options. Given/Mailing gonzales curiel paper work and contact info for [facility] facilitator/NN to complete paperwork in office. Advance Care Planning (ACP) Provider Conversation Snapshot    Date of ACP Conversation: 04/26/18  Persons included in Conversation:  Patient/family  Length of ACP Conversation in minutes:  5-10 minutes    Authorized Decision Maker (if patient is incapable of making informed decisions): This person is:   Healthcare Agent/Medical Power of  under Advance Directive            For Patients with Decision Making Capacity:   Values/Goals: Exploration of values, goals, and preferences if recovery is not expected, even with continued medical treatment in the event of:  Severe, permanent brain injury  \"In these circumstances, what matters most to you? \"  Care focused more on comfort or quality of life.     Conversation Outcomes / Follow-Up Plan:   Recommended completion of Advance Directive form after review of ACP materials and conversation with prospective healthcare agent   Referral made for ACP follow-up assistance to:  [facility] facilitator/ Nurse navigator

## 2018-04-26 NOTE — PATIENT INSTRUCTIONS
Advance Care Planning: Care Instructions  Your Care Instructions    It can be hard to live with an illness that cannot be cured. But if your health is getting worse, you may want to make decisions about end-of-life care. Planning for the end of your life does not mean that you are giving up. It is a way to make sure that your wishes are met. Clearly stating your wishes can make it easier for your loved ones. Making plans while you are still able may also ease your mind and make your final days less stressful and more meaningful. Follow-up care is a key part of your treatment and safety. Be sure to make and go to all appointments, and call your doctor if you are having problems. It's also a good idea to know your test results and keep a list of the medicines you take. What can you do to plan for the end of life? · You can bring these issues up with your doctor. You do not need to wait until your doctor starts the conversation. You might start with \"I would not be willing to live with . Elan Saunas Elan Saunas Elan Saunas \" When you complete this sentence it helps your doctor understand your wishes. · Talk openly and honestly with your doctor. This is the best way to understand the decisions you will need to make as your health changes. Know that you can always change your mind. · Ask your doctor about commonly used life-support measures. These include tube feedings, breathing machines, and fluids given through a vein (IV). Understanding these treatments will help you decide whether you want them. · You may choose to have these life-supporting treatments for a limited time. This allows a trial period to see whether they will help you. You may also decide that you want your doctor to take only certain measures to keep you alive. It is important to spell out these conditions so that your doctor and family understand them. · Talk to your doctor about how long you are likely to live.  He or she may be able to give you an idea of what usually happens with your specific illness. · Think about preparing papers that state your wishes. This way there will not be any confusion about what you want. You can change your instructions at any time. Which papers should you prepare? Advance directives are legal papers that tell doctors how you want to be cared for at the end of your life. You do not need a  to write these papers. Ask your doctor or your state health department for information on how to write your advance directives. They may have the forms for each of these types of papers. Make sure your doctor has a copy of these on file, and give a copy to a family member or close friend. · Consider a do-not-resuscitate order (DNR). This order asks that no extra treatments be done if your heart stops or you stop breathing. Extra treatments may include cardiopulmonary resuscitation (CPR), electrical shock to restart your heart, or a machine to breathe for you. If you decide to have a DNR order, ask your doctor to explain and write it. Place the order in your home where everyone can easily see it. · Consider a living will. A living will explains your wishes about life support and other treatments at the end of your life if you become unable to speak for yourself. Living courtney tell doctors to use or not use treatments that would keep you alive. You must have one or two witnesses or a notary present when you sign this form. · Consider a durable power of  for health care. This allows you to name a person to make decisions about your care if you are not able to. Most people ask a close friend or family member. Talk to this person about the kinds of treatments you want and those that you do not want. Make sure this person understands your wishes. These legal papers are simple to change. Tell your doctor what you want to change, and ask him or her to make a note in your medical file. Give your family updated copies of the papers.   Where can you learn more?  Go to http://shraddha-giles.info/. Enter P184 in the search box to learn more about \"Advance Care Planning: Care Instructions. \"  Current as of: September 24, 2016  Content Version: 11.4  © 6094-1429 [employer]. Care instructions adapted under license by [employer] (which disclaims liability or warranty for this information). If you have questions about a medical condition or this instruction, always ask your healthcare professional. [address] any warranty or liability for your use of this information. Advance Directives: Care Instructions  Your Care Instructions  An advance directive is a legal way to state your wishes at the end of your life. It tells your family and your doctor what to do if you can no longer say what you want. There are two main types of advance directives. You can change them any time that your wishes change. · A living will tells your family and your doctor your wishes about life support and other treatment. · A durable power of  for health care lets you name a person to make treatment decisions for you when you can't speak for yourself. This person is called a health care agent. If you do not have an advance directive, decisions about your medical care may be made by a doctor or a  who doesn't know you. It may help to think of an advance directive as a gift to the people who care for you. If you have one, they won't have to make tough decisions by themselves. Follow-up care is a key part of your treatment and safety. Be sure to make and go to all appointments, and call your doctor if you are having problems. It's also a good idea to know your test results and keep a list of the medicines you take. How can you care for yourself at home? · Discuss your wishes with your loved ones and your doctor. This way, there are no surprises. · Many states have a unique form.  Or you might use a universal form that has been approved by many states. This kind of form can sometimes be completed and stored online. Your electronic copy will then be available wherever you have a connection to the Internet. In most cases, doctors will respect your wishes even if you have a form from a different state. · You don't need a  to do an advance directive. But you may want to get legal advice. · Think about these questions when you prepare an advance directive:  ¨ Who do you want to make decisions about your medical care if you are not able to? Many people choose a family member or close friend. ¨ Do you know enough about life support methods that might be used? If not, talk to your doctor so you understand. ¨ What are you most afraid of that might happen? You might be afraid of having pain, losing your independence, or being kept alive by machines. ¨ Where would you prefer to die? Choices include your home, a hospital, or a nursing home. ¨ Would you like to have information about hospice care to support you and your family? ¨ Do you want to donate organs when you die? ¨ Do you want certain Mandaeism practices performed before you die? If so, put your wishes in the advance directive. · Read your advance directive every year, and make changes as needed. When should you call for help? Be sure to contact your doctor if you have any questions. Where can you learn more? Go to http://shraddha-giles.info/. Enter R264 in the search box to learn more about \"Advance Directives: Care Instructions. \"  Current as of: September 24, 2016  Content Version: 11.4  © 6921-1900 [employer] Care instructions adapted under license by [employer] (which disclaims liability or warranty for this information).  If you have questions about a medical condition or this instruction, always ask your healthcare professional. [address] any warranty or liability for your use of this information. Body Mass Index: Care Instructions  Your Care Instructions    Body mass index (BMI) can help you see if your weight is raising your risk for health problems. It uses a formula to compare how much you weigh with how tall you are. · A BMI lower than 18.5 is considered underweight. · A BMI between 18.5 and 24.9 is considered healthy. · A BMI between 25 and 29.9 is considered overweight. A BMI of 30 or higher is considered obese. If your BMI is in the normal range, it means that you have a lower risk for weight-related health problems. If your BMI is in the overweight or obese range, you may be at increased risk for weight-related health problems, such as high blood pressure, heart disease, stroke, arthritis or joint pain, and diabetes. If your BMI is in the underweight range, you may be at increased risk for health problems such as fatigue, lower protection (immunity) against illness, muscle loss, bone loss, hair loss, and hormone problems. BMI is just one measure of your risk for weight-related health problems. You may be at higher risk for health problems if you are not active, you eat an unhealthy diet, or you drink too much alcohol or use tobacco products. Follow-up care is a key part of your treatment and safety. Be sure to make and go to all appointments, and call your doctor if you are having problems. It's also a good idea to know your test results and keep a list of the medicines you take. How can you care for yourself at home? · Practice healthy eating habits. This includes eating plenty of fruits, vegetables, whole grains, lean protein, and low-fat dairy. · If your doctor recommends it, get more exercise. Walking is a good choice. Bit by bit, increase the amount you walk every day. Try for at least 30 minutes on most days of the week. · Do not smoke. Smoking can increase your risk for health problems.  If you need help quitting, talk to your doctor about stop-smoking programs and medicines. These can increase your chances of quitting for good. · Limit alcohol to 2 drinks a day for men and 1 drink a day for women. Too much alcohol can cause health problems. If you have a BMI higher than 25  · Your doctor may do other tests to check your risk for weight-related health problems. This may include measuring the distance around your waist. A waist measurement of more than 40 inches in men or 35 inches in women can increase the risk of weight-related health problems. · Talk with your doctor about steps you can take to stay healthy or improve your health. You may need to make lifestyle changes to lose weight and stay healthy, such as changing your diet and getting regular exercise. If you have a BMI lower than 18.5  · Your doctor may do other tests to check your risk for health problems. · Talk with your doctor about steps you can take to stay healthy or improve your health. You may need to make lifestyle changes to gain or maintain weight and stay healthy, such as getting more healthy foods in your diet and doing exercises to build muscle. Where can you learn more? Go to http://shraddha-giles.info/. Enter S176 in the search box to learn more about \"Body Mass Index: Care Instructions. \"  Current as of: October 13, 2016  Content Version: 11.4  © 2430-4051 Health[employer], [employer]. Care instructions adapted under license by [employer] (which disclaims liability or warranty for this information). If you have questions about a medical condition or this instruction, always ask your healthcare professional. [place] [address] any warranty or liability for your use of this information. Learning About Low-Carbohydrate Diets for Weight Loss  What is a low-carbohydrate diet? Low-carb diets avoid foods that are high in carbohydrate. These high-carb foods include pasta, bread, rice, cereal, fruits, and starchy vegetables.  Instead, these diets usually have you eat foods that are high in fat and protein. Many people lose weight quickly on a low-carb diet. But the early weight loss is water. People on this diet often gain the weight back after they start eating carbs again. Not all diet plans are safe or work well. A lot of the evidence shows that low-carb diets aren't healthy. That's because these diets often don't include healthy foods like fruits and vegetables. Losing weight safely means balancing protein, fat, and carbs with every meal and snack. And low-carb diets don't always provide the vitamins, minerals, and fiber you need. If you have a serious medical condition, talk to your doctor before you try any diet. These conditions include kidney disease, heart disease, type 2 diabetes, high cholesterol, and high blood pressure. If you are pregnant, it may not be safe for your baby if you are on a low-carb diet. How can you lose weight safely? You might have heard that a diet plan helped another person lose weight. But that doesn't mean that it will work for you. It is very hard to stay on a diet that includes lots of big changes in your eating habits. If you want to get to a healthy weight and stay there, making healthy lifestyle changes will often work better than dieting. These steps can help. · Make a plan for change. Work with your doctor to create a plan that is right for you. · See a dietitian. He or she can show you how to make healthy changes in your eating habits. · Manage stress. If you have a lot of stress in your life, it can be hard to focus on making healthy changes to your daily habits. · Track your food and activity. You are likely to do better at losing weight if you keep track of what you eat and what you do. Follow-up care is a key part of your treatment and safety. Be sure to make and go to all appointments, and call your doctor if you are having problems.  It's also a good idea to know your test results and keep a list of the medicines you take. Where can you learn more? Go to http://shraddha-giles.info/. Enter A121 in the search box to learn more about \"Learning About Low-Carbohydrate Diets for Weight Loss. \"  Current as of: May 12, 2017  Content Version: 11.4  © 2617-1397 Healthwise, "[employer]. Care instructions adapted under license by [employer] (which disclaims liability or warranty for this information). If you have questions about a medical condition or this instruction, always ask your healthcare professional. [address] any warranty or liability for your use of this information.

## 2018-04-26 NOTE — MR AVS SNAPSHOT
[facility] 
 
 
 [address] [facility] [address] [address] 
[phone] Patient: Otto Doe MRN: [id number]:[id number] Visit Information Date & Time Provider Department Dept. Phone Encounter #  
 4/26/2018  9:20 AM Arsenio Nelson NP [address] [facility] [phone] [id number] Follow-up Instructions Return in about 3 months (around 7/26/2018) for CHOL labs, HTN, BMI. Your Appointments 7/2/2018 11:00 AM  
ESTABLISHED PATIENT with Katy Vo MD  
[facility] [address] [place] [address]) Appt Note: 3 month follow-up [address] [address]  
[phone]  
  
   
 [address] [place] [address] [address] [id number]  
  
    
 7/30/2018  8:40 AM  
ROUTINE CARE with Arsenio Nelson NP  
[address]) Appt Note: cho, BMI and htn [address] [facility] [address] [address]  
[phone]  
  
   
 [address] [facility] Upcoming Health Maintenance Date Due  
 GLAUCOMA SCREENING Q2Y 4/29/2018* FOBT Q 1 YEAR AGE 50-75 3/23/2019 MEDICARE YEARLY EXAM 4/27/2019 BREAST CANCER SCRN MAMMOGRAM 8/17/2019 DTaP/Tdap/Td series (2 - Td) 6/26/2025 *Topic was postponed. The date shown is not the original due date. Allergies as of 4/26/2018  Review Complete On: 4/26/2018 By: Sonal Abdi LPN Severity Noted Reaction Type Reactions Citalopram  04/20/2016   Intolerance Other (comments) Not effective Prozac [Fluoxetine]  04/20/2016   Intolerance Anxiety Irritable, restless Current Immunizations  Reviewed on 1/29/2018 Name Date Pneumococcal Polysaccharide (PPSV-23) 1/29/2018 Not reviewed this visit You Were Diagnosed With   
  
 Codes Comments Severe obesity (BMI 35.0-39. 9) with comorbidity ([facility]    -  Primary ICD-10-CM: E66.01 
ICD-9-CM: 278.01   
 Medicare annual wellness visit, subsequent     ICD-10-CM: Z00.00 ICD-9-CM: V70.0 Advance care planning     ICD-10-CM: Z71.89 ICD-9-CM: V65.49 Anxiety and depression     ICD-10-CM: F41.9, F32.9 ICD-9-CM: 300.00, 311 Cigarette nicotine dependence without complication     Z-92-FC: F17.210 ICD-9-CM: 305.1 Hypertriglyceridemia without hypercholesterolemia     ICD-10-CM: E78.1 ICD-9-CM: 272.1 Essential hypertension with goal blood pressure less than 140/90     ICD-10-CM: I10 
ICD-9-CM: 401.9 Vitals BP Pulse Temp Height(growth percentile) Weight(growth percentile) BMI  
 140/65 (BP 1 Location: Left arm, BP Patient Position: Sitting) 81 97.2 °F (36.2 °C) (Oral) 5' 2\" (1.575 m) 191 lb 6.4 oz (86.8 kg) 35.01 kg/m2 OB Status Smoking Status Postmenopausal Current Every Day Smoker Vitals History BMI and BSA Data Body Mass Index Body Surface Area 35.01 kg/m 2 1.95 m 2 Preferred Pharmacy Pharmacy Name Phone Danae Payne [address] [place] [address] [phone] Your Updated Medication List  
  
   
This list is accurate as of 4/26/18 10:17 AM.  Always use your most recent med list.  
  
  
  
  
 ALPRAZolam 1 mg tablet Commonly known as:  [place] Take 1 Tab by mouth two (2) times daily as needed for Anxiety. Max Daily Amount: 2 mg. Indications: ANXIETY WITH DEPRESSION Start taking on:  4/28/2018  
  
 amLODIPine-atorvastatin 10-80 mg per tablet Commonly known as:  CADUET Take 1 Tab by mouth daily. aspirin delayed-release 81 mg tablet Take 1 Tab by mouth daily. buPROPion  mg XL tablet Commonly known as:  Autumn Fling Take 1 Tab by mouth daily (after breakfast). Indications: ANXIETY WITH DEPRESSION  
  
 icosapent ethyl 1 gram capsule Commonly known as:  VASCEPA Take 2 Caps by mouth two (2) times a day. ipratropium 42 mcg (0.06 %) nasal spray Commonly known as:  ATROVENT  
2 Sprays by Both Nostrils route four (4) times daily. traZODone 100 mg tablet Commonly known as:  Samule Grills Take 1 Tab by mouth nightly. Follow-up Instructions Return in about 3 months (around 7/26/2018) for CHOL labs, HTN, BMI. Patient Instructions Advance Care Planning: Care Instructions Your Care Instructions It can be hard to live with an illness that cannot be cured. But if your health is getting worse, you may want to make decisions about end-of-life care. Planning for the end of your life does not mean that you are giving up. It is a way to make sure that your wishes are met. Clearly stating your wishes can make it easier for your loved ones. Making plans while you are still able may also ease your mind and make your final days less stressful and more meaningful. Follow-up care is a key part of your treatment and safety. Be sure to make and go to all appointments, and call your doctor if you are having problems. It's also a good idea to know your test results and keep a list of the medicines you take. What can you do to plan for the end of life? · You can bring these issues up with your doctor. You do not need to wait until your doctor starts the conversation. You might start with \"I would not be willing to live with . Yadira Ventura \" When you complete this sentence it helps your doctor understand your wishes. · Talk openly and honestly with your doctor. This is the best way to understand the decisions you will need to make as your health changes. Know that you can always change your mind. · Ask your doctor about commonly used life-support measures. These include tube feedings, breathing machines, and fluids given through a vein (IV). Understanding these treatments will help you decide whether you want them.  
· You may choose to have these life-supporting treatments for a limited time. This allows a trial period to see whether they will help you. You may also decide that you want your doctor to take only certain measures to keep you alive. It is important to spell out these conditions so that your doctor and family understand them. · Talk to your doctor about how long you are likely to live. He or she may be able to give you an idea of what usually happens with your specific illness. · Think about preparing papers that state your wishes. This way there will not be any confusion about what you want. You can change your instructions at any time. Which papers should you prepare? Advance directives are legal papers that tell doctors how you want to be cared for at the end of your life. You do not need a  to write these papers. Ask your doctor or your state [facility] department for information on how to write your advance directives. They may have the forms for each of these types of papers. Make sure your doctor has a copy of these on file, and give a copy to a family member or close friend. · Consider a do-not-resuscitate order (DNR). This order asks that no extra treatments be done if your heart stops or you stop breathing. Extra treatments may include cardiopulmonary resuscitation (CPR), electrical shock to restart your heart, or a machine to breathe for you. If you decide to have a DNR order, ask your doctor to explain and write it. Place the order in your home where everyone can easily see it. · Consider a living will. A living will explains your wishes about life support and other treatments at the end of your life if you become unable to speak for yourself. Living courtney tell doctors to use or not use treatments that would keep you alive. You must have one or two witnesses or a notary present when you sign this form. · Consider a durable power of  for health care. This allows you to name a person to make decisions about your care if you are not able to. Most people ask a close friend or family member. Talk to this person about the kinds of treatments you want and those that you do not want. Make sure this person understands your wishes. These legal papers are simple to change. Tell your doctor what you want to change, and ask him or her to make a note in your medical file. Give your family updated copies of the papers. Where can you learn more? Go to http://shraddha-giles.info/. Enter P184 in the search box to learn more about \"Advance Care Planning: Care Instructions. \" Current as of: September 24, 2016 Content Version: 11.4 © 7652-3368 [employer]. Care instructions adapted under license by [employer] (which disclaims liability or warranty for this information). If you have questions about a medical condition or this instruction, always ask your healthcare professional. [place] [address] any warranty or liability for your use of this information. Advance Directives: Care Instructions Your Care Instructions An advance directive is a legal way to state your wishes at the end of your life. It tells your family and your doctor what to do if you can no longer say what you want. There are two main types of advance directives. You can change them any time that your wishes change. · A living will tells your family and your doctor your wishes about life support and other treatment. · A durable power of  for health care lets you name a person to make treatment decisions for you when you can't speak for yourself. This person is called a health care agent. If you do not have an advance directive, decisions about your medical care may be made by a doctor or a  who doesn't know you. It may help to think of an advance directive as a gift to the people who care for you. If you have one, they won't have to make tough decisions by themselves. Follow-up care is a key part of your treatment and safety. Be sure to make and go to all appointments, and call your doctor if you are having problems. It's also a good idea to know your test results and keep a list of the medicines you take. How can you care for yourself at home? · Discuss your wishes with your loved ones and your doctor. This way, there are no surprises. · Many states have a unique form. Or you might use a universal form that has been approved by many states. This kind of form can sometimes be completed and stored online. Your electronic copy will then be available wherever you have a connection to the Internet. In most cases, doctors will respect your wishes even if you have a form from a different state. · You don't need a  to do an advance directive. But you may want to get legal advice. · Think about these questions when you prepare an advance directive: ¨ Who do you want to make decisions about your medical care if you are not able to? Many people choose a family member or close friend. ¨ Do you know enough about life support methods that might be used? If not, talk to your doctor so you understand. ¨ What are you most afraid of that might happen? You might be afraid of having pain, losing your independence, or being kept alive by machines. ¨ Where would you prefer to die? Choices include your home, a hospital, or a nursing home. ¨ Would you like to have information about hospice care to support you and your family? ¨ Do you want to donate organs when you die? ¨ Do you want certain Christian practices performed before you die? If so, put your wishes in the advance directive. · Read your advance directive every year, and make changes as needed. When should you call for help? Be sure to contact your doctor if you have any questions. Where can you learn more? Go to http://shraddha-giles.info/. Enter R264 in the search box to learn more about \"Advance Directives: Care Instructions. \" Current as of: September 24, 2016 Content Version: 11.4 © 8971-1102 [employer]. Care instructions adapted under license by [employer] (which disclaims liability or warranty for this information). If you have questions about a medical condition or this instruction, always ask your healthcare professional. [facility] [address] any warranty or liability for your use of this information. Body Mass Index: Care Instructions Your Care Instructions Body mass index (BMI) can help you see if your weight is raising your risk for health problems. It uses a formula to compare how much you weigh with how tall you are. · A BMI lower than 18.5 is considered underweight. · A BMI between 18.5 and 24.9 is considered healthy. · A BMI between 25 and 29.9 is considered overweight. A BMI of 30 or higher is considered obese. If your BMI is in the normal range, it means that you have a lower risk for weight-related health problems. If your BMI is in the overweight or obese range, you may be at increased risk for weight-related health problems, such as high blood pressure, heart disease, stroke, arthritis or joint pain, and diabetes. If your BMI is in the underweight range, you may be at increased risk for health problems such as fatigue, lower protection (immunity) against illness, muscle loss, bone loss, hair loss, and hormone problems. BMI is just one measure of your risk for weight-related health problems. You may be at higher risk for health problems if you are not active, you eat an unhealthy diet, or you drink too much alcohol or use tobacco products. Follow-up care is a key part of your treatment and safety. Be sure to make and go to all appointments, and call your doctor if you are having problems.  It's also a good idea to know your test results and keep a list of the medicines you take. How can you care for yourself at home? · Practice healthy eating habits. This includes eating plenty of fruits, vegetables, whole grains, lean protein, and low-fat dairy. · If your doctor recommends it, get more exercise. Walking is a good choice. Bit by bit, increase the amount you walk every day. Try for at least 30 minutes on most days of the week. · Do not smoke. Smoking can increase your risk for health problems. If you need help quitting, talk to your doctor about stop-smoking programs and medicines. These can increase your chances of quitting for good. · Limit alcohol to 2 drinks a day for men and 1 drink a day for women. Too much alcohol can cause health problems. If you have a BMI higher than 25 · Your doctor may do other tests to check your risk for weight-related health problems. This may include measuring the distance around your waist. A waist measurement of more than 40 inches in men or 35 inches in women can increase the risk of weight-related health problems. · Talk with your doctor about steps you can take to stay healthy or improve your health. You may need to make lifestyle changes to lose weight and stay healthy, such as changing your diet and getting regular exercise. If you have a BMI lower than 18.5 · Your doctor may do other tests to check your risk for health problems. · Talk with your doctor about steps you can take to stay healthy or improve your health. You may need to make lifestyle changes to gain or maintain weight and stay healthy, such as getting more healthy foods in your diet and doing exercises to build muscle. Where can you learn more? Go to http://shraddha-giles.info/. Enter S176 in the search box to learn more about \"Body Mass Index: Care Instructions. \" Current as of: October 13, 2016 Content Version: 11.4 © 3030-6350 Healthwise, Incorporated.  Care instructions adapted under license by [address] (which disclaims liability or warranty for this information). If you have questions about a medical condition or this instruction, always ask your healthcare professional. [address] any warranty or liability for your use of this information. Learning About Low-Carbohydrate Diets for Weight Loss What is a low-carbohydrate diet? Low-carb diets avoid foods that are high in carbohydrate. These high-carb foods include pasta, bread, rice, cereal, fruits, and starchy vegetables. Instead, these diets usually have you eat foods that are high in fat and protein. Many people lose weight quickly on a low-carb diet. But the early weight loss is water. People on this diet often gain the weight back after they start eating carbs again. Not all diet plans are safe or work well. A lot of the evidence shows that low-carb diets aren't healthy. That's because these diets often don't include healthy foods like fruits and vegetables. Losing weight safely means balancing protein, fat, and carbs with every meal and snack. And low-carb diets don't always provide the vitamins, minerals, and fiber you need. If you have a serious medical condition, talk to your doctor before you try any diet. These conditions include kidney disease, heart disease, type 2 diabetes, high cholesterol, and high blood pressure. If you are pregnant, it may not be safe for your baby if you are on a low-carb diet. How can you lose weight safely? You might have heard that a diet plan helped another person lose weight. But that doesn't mean that it will work for you. It is very hard to stay on a diet that includes lots of big changes in your eating habits. If you want to get to a healthy weight and stay there, making healthy lifestyle changes will often work better than dieting. These steps can help. · Make a plan for change. Work with your doctor to create a plan that is right for you. · See a dietitian. He or she can show you how to make healthy changes in your eating habits. · Manage stress. If you have a lot of stress in your life, it can be hard to focus on making healthy changes to your daily habits. · Track your food and activity. You are likely to do better at losing weight if you keep track of what you eat and what you do. Follow-up care is a key part of your treatment and safety. Be sure to make and go to all appointments, and call your doctor if you are having problems. It's also a good idea to know your test results and keep a list of the medicines you take. Where can you learn more? Go to http://shraddha-giles.info/. Enter A121 in the search box to learn more about \"Learning About Low-Carbohydrate Diets for Weight Loss. \" Current as of: May 12, 2017 Content Version: 11.4 © 5646-4492 "[employer]Diagnostics, Inc.". Care instructions adapted under license by [employer] (which disclaims liability or warranty for this information). If you have questions about a medical condition or this instruction, always ask your healthcare professional. [place] [address] any warranty or liability for your use of this information. Introducing hospitals & HEALTH SERVICES! [facility] introduces [employer] ([employer]) patient portal. Now you can access parts of your medical record, email your doctor's office, and request medication refills online. 1. In your internet browser, go to [email or web address] 2. Click on the First Time User? Click Here link in the Sign In box. You will see the New Member Sign Up page. 3. Enter your [employer] ([employer]) Access Code exactly as it appears below. You will not need to use this code after youve completed the sign-up process. If you do not sign up before the expiration date, you must request a new code. · [employer] ([employer]) Access Code: [email or web address] Expires: 7/25/2018 10:16 AM 
 
 4. Enter the last four digits of your Social Security Number (xxxx) and Date of Birth (mm/dd/yyyy) as indicated and click Submit. You will be taken to the next sign-up page. 5. Create a [employer] ID. This will be your [employer] login ID and cannot be changed, so think of one that is secure and easy to remember. 6. Create a [employer] password. You can change your password at any time. 7. Enter your Password Reset Question and Answer. This can be used at a later time if you forget your password. 8. Enter your e-mail address. You will receive e-mail notification when new information is available in [address] 9. Click Sign Up. You can now view and download portions of your medical record. 10. Click the Download Summary menu link to download a portable copy of your medical information. If you have questions, please visit the Frequently Asked Questions section of the [employer] website. Remember, [employer] is NOT to be used for urgent needs. For medical emergencies, dial 911. Now available from your iPhone and Android! Please provide this summary of care documentation to your next provider. Your primary care clinician is listed as CHRISTEN More. If you have any questions after today's visit, please call [phone].

## 2018-07-02 ENCOUNTER — OFFICE VISIT (OUTPATIENT)
Dept: BEHAVIORAL/MENTAL HEALTH CLINIC | Age: 68
End: 2018-07-02

## 2018-07-02 VITALS
DIASTOLIC BLOOD PRESSURE: 71 MMHG | WEIGHT: 187 LBS | BODY MASS INDEX: 34.41 KG/M2 | HEIGHT: 62 IN | HEART RATE: 88 BPM | SYSTOLIC BLOOD PRESSURE: 129 MMHG

## 2018-07-02 DIAGNOSIS — F17.210 CIGARETTE NICOTINE DEPENDENCE WITHOUT COMPLICATION: ICD-10-CM

## 2018-07-02 DIAGNOSIS — F41.9 ANXIETY AND DEPRESSION: Primary | ICD-10-CM

## 2018-07-02 DIAGNOSIS — F32.A ANXIETY AND DEPRESSION: Primary | ICD-10-CM

## 2018-07-02 RX ORDER — TRAZODONE HYDROCHLORIDE 100 MG/1
100 TABLET ORAL
Qty: 30 TAB | Refills: 2 | Status: SHIPPED | OUTPATIENT
Start: 2018-07-02 | End: 2018-10-17 | Stop reason: SDUPTHER

## 2018-07-02 RX ORDER — BUPROPION HYDROCHLORIDE 300 MG/1
300 TABLET ORAL
Qty: 30 TAB | Refills: 2 | Status: SHIPPED | OUTPATIENT
Start: 2018-07-02 | End: 2018-10-17 | Stop reason: SDUPTHER

## 2018-07-02 RX ORDER — ALPRAZOLAM 1 MG/1
1 TABLET ORAL
Qty: 60 TAB | Refills: 2 | Status: SHIPPED | OUTPATIENT
Start: 2018-07-02 | End: 2018-10-17 | Stop reason: SDUPTHER

## 2018-07-30 ENCOUNTER — OFFICE VISIT (OUTPATIENT)
Dept: INTERNAL MEDICINE CLINIC | Age: 68
End: 2018-07-30

## 2018-07-30 VITALS
WEIGHT: 188.4 LBS | BODY MASS INDEX: 31.39 KG/M2 | OXYGEN SATURATION: 99 % | SYSTOLIC BLOOD PRESSURE: 134 MMHG | DIASTOLIC BLOOD PRESSURE: 71 MMHG | HEIGHT: 65 IN | TEMPERATURE: 97.8 F | HEART RATE: 64 BPM | RESPIRATION RATE: 16 BRPM

## 2018-07-30 DIAGNOSIS — I10 ESSENTIAL HYPERTENSION WITH GOAL BLOOD PRESSURE LESS THAN 140/90: Primary | ICD-10-CM

## 2018-07-30 DIAGNOSIS — E78.1 HYPERTRIGLYCERIDEMIA WITHOUT HYPERCHOLESTEROLEMIA: ICD-10-CM

## 2018-07-30 DIAGNOSIS — R74.8 ELEVATED LIVER ENZYMES: ICD-10-CM

## 2018-07-30 DIAGNOSIS — Z12.39 ENCOUNTER FOR SCREENING FOR MALIGNANT NEOPLASM OF BREAST: ICD-10-CM

## 2018-07-30 DIAGNOSIS — E87.0 HYPERNATREMIA: ICD-10-CM

## 2018-07-30 RX ORDER — TRAZODONE HYDROCHLORIDE 100 MG/1
TABLET ORAL
COMMUNITY
End: 2018-07-30 | Stop reason: SDUPTHER

## 2018-07-30 RX ORDER — AMLODIPINE BESYLATE AND ATORVASTATIN CALCIUM 10; 80 MG/1; MG/1
1 TABLET, FILM COATED ORAL DAILY
Qty: 90 TAB | Refills: 3 | Status: SHIPPED | OUTPATIENT
Start: 2018-07-30 | End: 2019-01-30 | Stop reason: SDUPTHER

## 2018-07-30 RX ORDER — ICOSAPENT ETHYL 1000 MG/1
2 CAPSULE ORAL 2 TIMES DAILY
Qty: 360 CAP | Refills: 3 | Status: SHIPPED | OUTPATIENT
Start: 2018-07-30 | End: 2019-01-30 | Stop reason: SDUPTHER

## 2018-07-30 NOTE — PATIENT INSTRUCTIONS
Learning About Low-Carbohydrate Diets for Weight Loss  What is a low-carbohydrate diet? Low-carb diets avoid foods that are high in carbohydrate. These high-carb foods include pasta, bread, rice, cereal, fruits, and starchy vegetables. Instead, these diets usually have you eat foods that are high in fat and protein. Many people lose weight quickly on a low-carb diet. But the early weight loss is water. People on this diet often gain the weight back after they start eating carbs again. Not all diet plans are safe or work well. A lot of the evidence shows that low-carb diets aren't healthy. That's because these diets often don't include healthy foods like fruits and vegetables. Losing weight safely means balancing protein, fat, and carbs with every meal and snack. And low-carb diets don't always provide the vitamins, minerals, and fiber you need. If you have a serious medical condition, talk to your doctor before you try any diet. These conditions include kidney disease, heart disease, type 2 diabetes, high cholesterol, and high blood pressure. If you are pregnant, it may not be safe for your baby if you are on a low-carb diet. How can you lose weight safely? You might have heard that a diet plan helped another person lose weight. But that doesn't mean that it will work for you. It is very hard to stay on a diet that includes lots of big changes in your eating habits. If you want to get to a healthy weight and stay there, making healthy lifestyle changes will often work better than dieting. These steps can help. · Make a plan for change. Work with your doctor to create a plan that is right for you. · See a dietitian. He or she can show you how to make healthy changes in your eating habits. · Manage stress. If you have a lot of stress in your life, it can be hard to focus on making healthy changes to your daily habits. · Track your food and activity.  You are likely to do better at losing weight if you keep track of what you eat and what you do. Follow-up care is a key part of your treatment and safety. Be sure to make and go to all appointments, and call your doctor if you are having problems. It's also a good idea to know your test results and keep a list of the medicines you take. Where can you learn more? Go to http://shraddha-giles.info/. Enter A121 in the search box to learn more about \"Learning About Low-Carbohydrate Diets for Weight Loss. \"  Current as of: May 12, 2017  Content Version: 11.7  © 4597-5668 [employer]c, Incorporated. Care instructions adapted under license by [employer] (which disclaims liability or warranty for this information). If you have questions about a medical condition or this instruction, always ask your healthcare professional. [address] any warranty or liability for your use of this information.

## 2018-07-30 NOTE — PROGRESS NOTES
Triny Mendoza is a 80 y.o. female and presents with Cholesterol Problem (f/u) and Hypertension (f/u)    Subjective:  Pt presents to f/u elevated triglycerides. Continues modified diet, taking meds daily as prescribed. Denies myalgias, slurring speech, unilateral weakness, and chest pain. A repeat fasting lipid profile was ordered. The patient does NOT use medications that may worsen dyslipidemias (corticosteroids, progestins, anabolic steroids, diuretics, beta-blockers, amiodarone, cyclosporine, olanzapine). The patient does try to exercise regularly. The patient is NOT known to have coexisting coronary artery disease. Taking Aspirin daily as prescribed/advised    Hypertension Review:  The patient has hypertension  Diet and Lifestyle: generally does try to follow a  low sodium diet, exercises sporadically   Home BP Monitoring: is not measured at home. Pertinent ROS: taking medications as instructed, no medication side effects noted. No TIA's, chest pain on exertion, dyspnea on exertion, or swelling of ankles. BP Readings from Last 3 Encounters:   07/30/18 134/71   07/02/18 129/71   04/26/18 140/65     Smoking cessation Review:  Patient presents to discuss smoking cessation. The patient currently is smoking 4-5 cigs/day. She has smoked for multiple  years. She has tried to quit multiple times in the past using cold turkey. Taking Wellbutrin BID.      Review of Systems  Constitutional: negative for fevers, chills, anorexia and weight loss  Respiratory:  negative for hemoptysis, dyspnea, and wheezing  CV:   negative for chest pain, palpitations, and lower extremity edema  GI:   negative for nausea, vomiting, diarrhea, abdominal pain, and melena  Endo:               negative for polyuria,polydipsia,polyphagia, and heat intolerance  Genitourinary: negative for frequency, urgency, dysuria, retention, and hematuria  Integument:  negative for rash, ulcerations, and pruritus  Hematologic:  negative for easy bruising and bleeding  Musculoskel: negative for arthralgias, muscle weakness,and joint pain/swelling  Neurological:  negative for headaches, dizziness, vertigo,and memory/gait problems  Behavl/Psych: negative for feelings of suicide, and mood changes    Past Medical History:   Diagnosis Date    Anxiety     Arthritis     Hypertension     Psychiatric disorder      History reviewed. No pertinent surgical history. Social History     Social History    Marital status:      Spouse name: N/A    Number of children: N/A    Years of education: N/A     Social History Main Topics    Smoking status: Current Every Day Smoker     Packs/day: 0.25    Smokeless tobacco: Never Used      Comment: down to 1/4 ppd from 1/2 ppd    Alcohol use No    Drug use: No    Sexual activity: Not Asked     Other Topics Concern    None     Social History Narrative     Family History   Problem Relation Age of Onset    No Known Problems Mother     No Known Problems Father      Current Outpatient Prescriptions   Medication Sig Dispense Refill    amLODIPine-atorvastatin (CADUET) 10-80 mg per tablet Take 1 Tab by mouth daily. 90 Tab 3    icosapent ethyl (VASCEPA) 1 gram capsule Take 2 Caps by mouth two (2) times a day. 360 Cap 3    ALPRAZolam (XANAX) 1 mg tablet Take 1 Tab by mouth two (2) times daily as needed for Anxiety. Max Daily Amount: 2 mg. Indications: ANXIETY WITH DEPRESSION 60 Tab 2    buPROPion XL (WELLBUTRIN XL) 300 mg XL tablet Take 1 Tab by mouth daily (after breakfast). Indications: ANXIETY WITH DEPRESSION 30 Tab 2    traZODone (DESYREL) 100 mg tablet Take 1 Tab by mouth nightly. 30 Tab 2    ipratropium (ATROVENT) 0.06 % nasal spray 2 Sprays by Both Nostrils route four (4) times daily. 15 mL 0    aspirin delayed-release 81 mg tablet Take 1 Tab by mouth daily.  30 Tab 11     Allergies   Allergen Reactions    Citalopram Other (comments)     Not effective    Prozac [Fluoxetine] Anxiety     Irritable, restless Objective:  Visit Vitals    /71 (BP 1 Location: Left arm, BP Patient Position: Sitting)    Pulse 64    Temp 97.8 °F (36.6 °C) (Oral)    Resp 16    Ht 5' 5\" (1.651 m)    Wt 188 lb 6.4 oz (85.5 kg)    SpO2 99%    BMI 31.35 kg/m2     Wt Readings from Last 3 Encounters:   07/30/18 188 lb 6.4 oz (85.5 kg)   07/02/18 187 lb (84.8 kg)   04/26/18 191 lb 6.4 oz (86.8 kg)     Physical Exam:   General appearance - alert, well appearing, and in no distress. Mental status - A/O x 4, normal mood and affect. Neck -Supple ,normal CSP. FROM, non-tender. No significant adenopathy/thyromegaly. No JVD. Chest - CTA. Symmetric chest rise. No wheezing, rales or rhonchi. Heart - Normal rate, regular rhythm. Normal S1, S2. No MGR or clicks. Abdomen - Soft,non-distended. Normoactive BS in all quadrants. NT, no mass or HSM. Ext- Radial, DP pulses, 2+ bilaterally. No pedal edema, clubbing, or cyanosis. Skin-Warm and dry. No hyperpigmentation, ulcerations, or suspicious lesions. Neuro - Normal speech, no focal findings or movement disorder. Normal strength, gait, and muscle tone. Assessment/Plan:  Discussed the patient's BMI with her. The BMI follow up plan is as follows: Referred for meal planning    I have reviewed/discussed the above normal BMI (Body mass index is 31.35 kg/(m^2). ) with the patient. I have recommended the following interventions: encourage exercise, monitor weight, and dietary management education, guidance, and counseling. See below for other orders   Follow-up Disposition:  Return in about 6 months (around 1/30/2019) for HTN, CHOL, BMI. ICD-10-CM ICD-9-CM    1. Essential hypertension with goal blood pressure less than 140/90 I10 401.9 REFERRAL TO [facility]   2. BMI 34.0-34.9,adult Z68.34 V85.34 REFERRAL TO [facility]   3.  Hypertriglyceridemia without hypercholesterolemia E78.1 272.1 LIPID PANEL      REFERRAL TO [facility]      amLODIPine-atorvastatin (CADUET) 10-80 mg per tablet      icosapent ethyl (VASCEPA) 1 gram capsule   4. Hypernatremia E16.2 642.3 METABOLIC PANEL, COMPREHENSIVE   5. Elevated liver enzymes J08.5 592.8 METABOLIC PANEL, COMPREHENSIVE   6. Encounter for screening for malignant neoplasm of breast Z12.31 V76.10 ANA MAMMO BI SCREENING INCL CAD     Orders Placed This Encounter    ANA MAMMO BI SCREENING INCL CAD     Standing Status:   Future     Standing Expiration Date:   8/31/2019     Order Specific Question:   Reason for Exam     Answer:   breast cancer screening    LIPID PANEL    METABOLIC PANEL, COMPREHENSIVE    REFERRAL TO [address] [place] [address] Meal planning for wt loss. Referral Priority:   Routine     Referral Type:   Consultation     Referral Reason:   Specialty Services Required    DISCONTD: traZODone (DESYREL) 100 mg tablet     Sig: trazodone 100 mg tablet    amLODIPine-atorvastatin (CADUET) 10-80 mg per tablet     Sig: Take 1 Tab by mouth daily. Dispense:  90 Tab     Refill:  3    icosapent ethyl (VASCEPA) 1 gram capsule     Sig: Take 2 Caps by mouth two (2) times a day. Dispense:  360 Cap     Refill:  3       Arabella Lieberman expressed understanding of plan. An After Visit Summary was offered/printed and given to the patient.

## 2018-07-30 NOTE — PROGRESS NOTES
Chief Complaint   Patient presents with    Cholesterol Problem     f/u    Hypertension     f/u     1. Have you been to the ER, urgent care clinic since your last visit? Hospitalized since your last visit? No    2. Have you seen or consulted any other health care providers outside of the [address] [place] [address] since your last visit? Include any pap smears or colon screening. No  Fall Risk Assessment, last 12 mths 7/30/2018   Able to walk? Yes   Fall in past 12 months? No     Abuse Screening Questionnaire 7/30/2018   Do you ever feel afraid of your partner? N   Are you in a relationship with someone who physically or mentally threatens you? N   Is it safe for you to go home?  Abby Velazquez

## 2018-07-30 NOTE — MR AVS SNAPSHOT
[place] 
 
 
 [address] [facility] [place] [address] 
[phone] Patient: Gustavo Barreto MRN: [id number] ZGV:[id number] Visit Information Date & Time Provider Department Dept. Phone Encounter #  
 7/30/2018  8:40 AM Kim Lenz NP [address] [phone] [id number] Follow-up Instructions Return in about 6 months (around 1/30/2019) for HTN, CHOL, BMI. Your Appointments 10/17/2018 11:00 AM  
ESTABLISHED PATIENT with Lyndon Pinzon MD  
[facility]) Appt Note: 3 month follow up [facility] [address] [facility] [address]  
  
   
 [address] [place] [address] [address] [place] [address] Upcoming Health Maintenance Date Due  
 GLAUCOMA SCREENING Q2Y 10/28/2018* Influenza Age 5 to Adult 8/1/2018 FOBT Q 1 YEAR AGE 50-75 3/23/2019 MEDICARE YEARLY EXAM 4/27/2019 BREAST CANCER SCRN MAMMOGRAM 8/17/2019 DTaP/Tdap/Td series (2 - Td) 6/26/2025 *Topic was postponed. The date shown is not the original due date. Allergies as of 7/30/2018  Review Complete On: 7/30/2018 By: Kim Lenz NP Severity Noted Reaction Type Reactions Citalopram  04/20/2016   Intolerance Other (comments) Not effective Prozac [Fluoxetine]  04/20/2016   Intolerance Anxiety Irritable, restless Current Immunizations  Reviewed on 1/29/2018 Name Date Pneumococcal Polysaccharide (PPSV-23) 1/29/2018 Not reviewed this visit You Were Diagnosed With   
  
 Codes Comments Essential hypertension with goal blood pressure less than 140/90    -  Primary ICD-10-CM: I10 
ICD-9-CM: 401.9 BMI 34.0-34.9,adult     ICD-10-CM: U84.06 
ICD-9-CM: V85.34 Hypertriglyceridemia without hypercholesterolemia     ICD-10-CM: E78.1 ICD-9-CM: 272.1 Hypernatremia     ICD-10-CM: E87.0 ICD-9-CM: 276.0 Elevated liver enzymes     ICD-10-CM: R74.8 ICD-9-CM: 790.5 Vitals BP Pulse Temp Resp Height(growth percentile) Weight(growth percentile) 134/71 (BP 1 Location: Left arm, BP Patient Position: Sitting) 64 97.8 °F (36.6 °C) (Oral) 16 5' 5\" (1.651 m) 188 lb 6.4 oz (85.5 kg) SpO2 BMI OB Status Smoking Status 99% 31.35 kg/m2 Postmenopausal Current Every Day Smoker Vitals History BMI and BSA Data Body Mass Index Body Surface Area  
 31.35 kg/m 2 1.98 m 2 Preferred Pharmacy [facility] Phone [facility] [place] [address] [phone] Your Updated Medication List  
  
   
This list is accurate as of 7/30/18 10:18 AM.  Always use your most recent med list.  
  
  
  
  
 ALPRAZolam 1 mg tablet Commonly known as:  Alcus Allen Take 1 Tab by mouth two (2) times daily as needed for Anxiety. Max Daily Amount: 2 mg. Indications: ANXIETY WITH DEPRESSION  
  
 amLODIPine-atorvastatin 10-80 mg per tablet Commonly known as:  CADUET Take 1 Tab by mouth daily. aspirin delayed-release 81 mg tablet Take 1 Tab by mouth daily. buPROPion  mg XL tablet Commonly known as:  [place] Galas Take 1 Tab by mouth daily (after breakfast). Indications: ANXIETY WITH DEPRESSION  
  
 icosapent ethyl 1 gram capsule Commonly known as:  VASCEPA Take 2 Caps by mouth two (2) times a day. ipratropium 42 mcg (0.06 %) nasal spray Commonly known as:  ATROVENT  
2 Sprays by Both Nostrils route four (4) times daily. traZODone 100 mg tablet Commonly known as:  [place] Take 1 Tab by mouth nightly. Prescriptions Sent to Pharmacy Refills  
 amLODIPine-atorvastatin (CADUET) 10-80 mg per tablet 3 Sig: Take 1 Tab by mouth daily. Class: Normal  
 [facility]: [facility] [address] [place] [address] Ph #: [phone] Route: Oral  
 icosapent ethyl (VASCEPA) 1 gram capsule 3 Sig: Take 2 Caps by mouth two (2) times a day. Class: Normal  
 Pharmacy: [facility] [address] [facility], [address] [place] [address] #: [phone] Route: Oral  
  
We Performed the Following LIPID PANEL [20307 CPT(R)] METABOLIC PANEL, COMPREHENSIVE [95125 CPT(R)] REFERRAL TO DI[facility]ETES [facility] CTR [id number] Custom] Comments:  
 Meal planning for wt loss. Follow-up Instructions Return in about 6 months (around 1/30/2019) for HTN, CHOL, BMI. Referral Information Referral ID Referred By Referred To  
  
 [id number] bA Nissen HECTOR Not Available Visits Status Start Date End Date 1 New Request 7/30/18 7/30/19 If your referral has a status of pending review or denied, additional information will be sent to support the outcome of this decision. Patient Instructions Learning About Low-Carbohydrate Diets for Weight Loss What is a low-carbohydrate diet? Low-carb diets avoid foods that are high in carbohydrate. These high-carb foods include pasta, bread, rice, cereal, fruits, and starchy vegetables. Instead, these diets usually have you eat foods that are high in fat and protein. Many people lose weight quickly on a low-carb diet. But the early weight loss is water. People on this diet often gain the weight back after they start eating carbs again. Not all diet plans are safe or work well. A lot of the evidence shows that low-carb diets aren't healthy. That's because these diets often don't include healthy foods like fruits and vegetables. Losing weight safely means balancing protein, fat, and carbs with every meal and snack. And low-carb diets don't always provide the vitamins, minerals, and fiber you need. If you have a serious medical condition, talk to your doctor before you try any diet. These conditions include kidney disease, heart disease, type 2 diabetes, high cholesterol, and high blood pressure. If you are pregnant, it may not be safe for your baby if you are on a low-carb diet. How can you lose weight safely? You might have heard that a diet plan helped another person lose weight. But that doesn't mean that it will work for you. It is very hard to stay on a diet that includes lots of big changes in your eating habits. If you want to get to a healthy weight and stay there, making healthy lifestyle changes will often work better than dieting. These steps can help. · Make a plan for change. Work with your doctor to create a plan that is right for you. · See a dietitian. He or she can show you how to make healthy changes in your eating habits. · Manage stress. If you have a lot of stress in your life, it can be hard to focus on making healthy changes to your daily habits. · Track your food and activity. You are likely to do better at losing weight if you keep track of what you eat and what you do. Follow-up care is a key part of your treatment and safety. Be sure to make and go to all appointments, and call your doctor if you are having problems. It's also a good idea to know your test results and keep a list of the medicines you take. Where can you learn more? Go to http://shraddha-giles.info/. Enter A121 in the search box to learn more about \"Learning About Low-Carbohydrate Diets for Weight Loss. \" Current as of: May 12, 2017 Content Version: 11.7 © 9377-4127 [employer]. Care instructions adapted under license by [employer] (which disclaims liability or warranty for this information). If you have questions about a medical condition or this instruction, always ask your healthcare professional. [place] [address] any warranty or liability for your use of this information. Introducing [facility]! [facility] introduces 3 day Blinds patient portal. Now you can access parts of your medical record, email your doctor's office, and request medication refills online.    
 
1. In your internet browser, go to [email or web address] 2. Click on the First Time User? Click Here link in the Sign In box. You will see the New Member Sign Up page. 3. Enter your [employer] Access Code exactly as it appears below. You will not need to use this code after youve completed the sign-up process. If you do not sign up before the expiration date, you must request a new code. · [employer] Access Code: [email or web address] Expires: 10/28/2018  9:03 AM 
 
4. Enter the last four digits of your Social Security Number (xxxx) and Date of Birth (mm/dd/yyyy) as indicated and click Submit. You will be taken to the next sign-up page. 5. Create a Shareableet ID. This will be your [employer] Trade login ID and cannot be changed, so think of one that is secure and easy to remember. 6. Create a [employer] password. You can change your password at any time. 7. Enter your Password Reset Question and Answer. This can be used at a later time if you forget your password. 8. Enter your e-mail address. You will receive e-mail notification when new information is available in [address] 9. Click Sign Up. You can now view and download portions of your medical record. 10. Click the Download Summary menu link to download a portable copy of your medical information. If you have questions, please visit the Frequently Asked Questions section of the [employer] website. Remember, [employer] is NOT to be used for urgent needs. For medical emergencies, dial 911. Now available from your iPhone and Android! Please provide this summary of care documentation to your next provider. Your primary care clinician is listed as CHRISTEN Estes. If you have any questions after today's visit, please call [phone].

## 2018-08-22 ENCOUNTER — HOSPITAL ENCOUNTER (OUTPATIENT)
Dept: MAMMOGRAPHY | Age: 68
Discharge: HOME | End: 2018-08-22
Attending: NURSE PRACTITIONER
Payer: MEDICARE

## 2018-08-22 DIAGNOSIS — Z12.39 ENCOUNTER FOR SCREENING FOR MALIGNANT NEOPLASM OF BREAST: ICD-10-CM

## 2018-08-22 PROCEDURE — 77067 SCR MAMMO BI INCL CAD: CPT

## 2018-08-23 LAB
ALBUMIN SERPL-MCNC: 4.4 G/DL (ref 3.6–4.8)
ALBUMIN/GLOB SERPL: 1.6 {RATIO} (ref 1.2–2.2)
ALP SERPL-CCNC: 162 IU/L (ref 39–117)
ALT SERPL-CCNC: 10 IU/L (ref 0–32)
AST SERPL-CCNC: 15 IU/L (ref 0–40)
BILIRUB SERPL-MCNC: 0.4 MG/DL (ref 0–1.2)
BUN SERPL-MCNC: 10 MG/DL (ref 8–27)
BUN/CREAT SERPL: 14 (ref 12–28)
CALCIUM SERPL-MCNC: 9.9 MG/DL (ref 8.7–10.3)
CHLORIDE SERPL-SCNC: 106 MMOL/L (ref 96–106)
CHOLEST SERPL-MCNC: 177 MG/DL (ref 100–199)
CO2 SERPL-SCNC: 25 MMOL/L (ref 20–29)
CREAT SERPL-MCNC: 0.74 MG/DL (ref 0.57–1)
GLOBULIN SER CALC-MCNC: 2.7 G/DL (ref 1.5–4.5)
GLUCOSE SERPL-MCNC: 90 MG/DL (ref 65–99)
HDLC SERPL-MCNC: 54 MG/DL
INTERPRETATION, 910389: NORMAL
LDLC SERPL CALC-MCNC: 98 MG/DL (ref 0–99)
POTASSIUM SERPL-SCNC: 3.9 MMOL/L (ref 3.5–5.2)
PROT SERPL-MCNC: 7.1 G/DL (ref 6–8.5)
SODIUM SERPL-SCNC: 145 MMOL/L (ref 134–144)
TRIGL SERPL-MCNC: 126 MG/DL (ref 0–149)
VLDLC SERPL CALC-MCNC: 25 MG/DL (ref 5–40)

## 2018-09-13 ENCOUNTER — TELEPHONE (OUTPATIENT)
Dept: DIABETES SERVICES | Age: 68
End: 2018-09-13

## 2018-10-17 ENCOUNTER — OFFICE VISIT (OUTPATIENT)
Dept: BEHAVIORAL/MENTAL HEALTH CLINIC | Age: 68
End: 2018-10-17

## 2018-10-17 VITALS
WEIGHT: 187 LBS | BODY MASS INDEX: 31.16 KG/M2 | SYSTOLIC BLOOD PRESSURE: 140 MMHG | HEIGHT: 65 IN | HEART RATE: 82 BPM | DIASTOLIC BLOOD PRESSURE: 82 MMHG

## 2018-10-17 DIAGNOSIS — F32.A ANXIETY AND DEPRESSION: Primary | ICD-10-CM

## 2018-10-17 DIAGNOSIS — F41.9 ANXIETY AND DEPRESSION: Primary | ICD-10-CM

## 2018-10-17 DIAGNOSIS — F17.210 CIGARETTE NICOTINE DEPENDENCE WITHOUT COMPLICATION: ICD-10-CM

## 2018-10-17 DIAGNOSIS — E66.01 SEVERE OBESITY (BMI 35.0-39.9) WITH COMORBIDITY (HCC): ICD-10-CM

## 2018-10-17 RX ORDER — TRAZODONE HYDROCHLORIDE 100 MG/1
150 TABLET ORAL
Qty: 45 TAB | Refills: 2 | Status: SHIPPED | OUTPATIENT
Start: 2018-10-17 | End: 2019-01-21 | Stop reason: DRUGHIGH

## 2018-10-17 RX ORDER — ALPRAZOLAM 1 MG/1
1 TABLET ORAL
Qty: 60 TAB | Refills: 2 | Status: SHIPPED | OUTPATIENT
Start: 2018-10-17 | End: 2019-01-21 | Stop reason: SDUPTHER

## 2018-10-17 RX ORDER — BUPROPION HYDROCHLORIDE 300 MG/1
300 TABLET ORAL
Qty: 30 TAB | Refills: 2 | Status: SHIPPED | OUTPATIENT
Start: 2018-10-17 | End: 2019-01-21 | Stop reason: SDUPTHER

## 2018-10-17 NOTE — PROGRESS NOTES
Psychiatric Outpatient Progress Note    Account Number:  [id number]  Name: Jane Kim    SUBJECTIVE:   CHIEF COMPLAINT:  Clement Maldonado a 68 y.o. , AA female and was seen today for 3 month follow-up of psychiatric condition and psychotropic medication management.       HPI:    Coretta reports the following psychiatric symptoms:  depression, anxiety and caregiver stress.  The symptoms have been present for years and are of moderate severity. The symptoms occur daily.      Pt reports that she is extremely stressed by Medicaid denial by [facility] for her . He is getting sicker and sicker and will require NH level of care but may not qualify for it if Medicaid is not in place. Her 's physicians are helping with documentation to appeal the denial. She is not sleeping that well. She feels anxious and irritable at times. [place] is [address] with trazodone. Denies any psychosis or arlene. Reports compliance with medications.      Continues to smoke 1/2-1 ppd. Weight is stably high. BMI = 31.  BP/HR is WNL. Has good self care.       Contributing factors include: caregiver burden.       Patient denies SI/HI/SIB.        Side Effects:  none        Fam/Soc Hx (from Inial Eval with updates):        REVIEW OF SYSTEMS:  Constitutional: positive for fatigue   Eyes: positive for contacts/glasses  Ears, nose, mouth, throat, and face: negative for hearing loss and tinnitus  Respiratory: positive for cough  Neurological: negative for headaches and seizures  Behavioral/Psych: positive for anxiety, negative for SI or HI  Appetite: fair  Sleep: poor    Visit Vitals  /82   Pulse 82   Ht 5' 5\" (1.651 m)   Wt 84.8 kg (187 lb)   BMI 31.12 kg/m²       OBJECTIVE:                 Mental Status exam: WNL except for      Sensorium  oriented to time, place and person   Relations cooperative and passive    Eye Contact    appropriate   Appearance:  age appropriate, casually dressed and overweight   Motor Behavior/Gait: within normal limits   Speech:  normal pitch and normal volume   Thought Process: goal directed and logical   Thought Content free of delusions and free of hallucinations   Suicidal ideations none   Homicidal ideations none   Mood:  anxious   Affect:  anxious and sad   Memory recent  adequate   Memory remote:  adequate   Concentration:  adequate   Abstraction:  concrete   Insight:  fair   Reliability fair   Judgment:  fair       MEDICAL DECISION MAKING  Data: pertinent labs, imaging, medical records and diagnostic tests reviewed and incorporated in diagnosis and treatment plan    Allergies   Allergen Reactions    Citalopram Other (comments)     Not effective    Prozac [Fluoxetine] Anxiety     Irritable, restless        Current Outpatient Medications   Medication Sig Dispense Refill    ALPRAZolam (XANAX) 1 mg tablet Take 1 Tab by mouth two (2) times daily as needed for Anxiety. Max Daily Amount: 2 mg. 60 Tab 2    buPROPion XL (WELLBUTRIN XL) 300 mg XL tablet Take 1 Tab by mouth daily (after breakfast). 30 Tab 2    traZODone (DESYREL) 100 mg tablet Take 1.5 Tabs by mouth nightly. 45 Tab 2    amLODIPine-atorvastatin (CADUET) 10-80 mg per tablet Take 1 Tab by mouth daily. 90 Tab 3    icosapent ethyl (VASCEPA) 1 gram capsule Take 2 Caps by mouth two (2) times a day. 360 Cap 3    ipratropium (ATROVENT) 0.06 % nasal spray 2 Sprays by Both Nostrils route four (4) times daily. 15 mL 0    aspirin delayed-release 81 mg tablet Take 1 Tab by mouth daily. 30 Tab 11          Problems addressed today:    ICD-10-CM ICD-9-CM    1. Anxiety and depression F41.9 300.00 ALPRAZolam (XANAX) 1 mg tablet    F32.9 311    2. Cigarette nicotine dependence without complication V95.471 306.9    3. Severe obesity (BMI 35.0-39. 9) with comorbidity ([facility] E66.01 278.01        Assessment:   Benjamín Hicks  is a 80 y.o.  female  Is partially responding to treatment. Symptoms are unstable. Patient denies SI/HI/SIB.   No evidence of AH/VH or delusions. Risk Scoring- chronic illnesses and prescription drug management    Treatment Plan:  1. Medications:          Medication Changes/Adjustments: Increase trazodone , Continue Wellbutrin and Xanax in the current dosages. Current Outpatient Medications   Medication Sig Dispense Refill    ALPRAZolam (XANAX) 1 mg tablet Take 1 Tab by mouth two (2) times daily as needed for Anxiety. Max Daily Amount: 2 mg. 60 Tab 2    buPROPion XL (WELLBUTRIN XL) 300 mg XL tablet Take 1 Tab by mouth daily (after breakfast). 30 Tab 2    traZODone (DESYREL) 100 mg tablet Take 1.5 Tabs by mouth nightly. 45 Tab 2    amLODIPine-atorvastatin (CADUET) 10-80 mg per tablet Take 1 Tab by mouth daily. 90 Tab 3    icosapent ethyl (VASCEPA) 1 gram capsule Take 2 Caps by mouth two (2) times a day. 360 Cap 3    ipratropium (ATROVENT) 0.06 % nasal spray 2 Sprays by Both Nostrils route four (4) times daily. 15 mL 0    aspirin delayed-release 81 mg tablet Take 1 Tab by mouth daily. 27 Tab 11                  The following regarding medications was addressed:    (The risks and benefits of the proposed medication; the potential medication side effects ie    dry mouth, weight gain, GI upset, headache; patient given opportunity to ask questions)       2. Counseling and coordination of care including instructions for treatment, risks/benefits, risk factor reduction and patient/family education. She agrees with the plan. Patient instructed to call with any side effects, questions or issues. 3.  Patient was provided supportive counseling for her caregiver burden. Patient was a strongly recommended to seek help from her family members.      4. She was also educated on smoking cessation and complications of obesity.        PSYCHOTHERAPY:  approx 20 minutes  Type:  Supportive/Solution Focused psychotherapy provided  Focus:     Current problems:   Caregiver burden   Medical issues    Psychoeducation provided:  Psych medications. Treatment plan reviewed with patient-including diagnosis and medications    Worked on issues of denial & effects of substance dependency/use    Irvin James is stable. Follow-up Disposition:  Return in about 3 months (around 1/17/2019).     Kim Chahal MD  10/17/2018

## 2018-10-26 RX ORDER — TRAZODONE HYDROCHLORIDE 100 MG/1
TABLET ORAL
Qty: 30 TAB | Refills: 1 | Status: SHIPPED | OUTPATIENT
Start: 2018-10-26 | End: 2019-01-21 | Stop reason: SDUPTHER

## 2018-11-09 ENCOUNTER — TELEPHONE (OUTPATIENT)
Dept: DIABETES SERVICES | Age: 68
End: 2018-11-09

## 2019-01-21 ENCOUNTER — OFFICE VISIT (OUTPATIENT)
Dept: BEHAVIORAL/MENTAL HEALTH CLINIC | Age: 69
End: 2019-01-21

## 2019-01-21 VITALS
DIASTOLIC BLOOD PRESSURE: 71 MMHG | WEIGHT: 187.2 LBS | HEIGHT: 65 IN | BODY MASS INDEX: 31.19 KG/M2 | SYSTOLIC BLOOD PRESSURE: 116 MMHG | HEART RATE: 75 BPM

## 2019-01-21 DIAGNOSIS — F32.A ANXIETY AND DEPRESSION: Primary | ICD-10-CM

## 2019-01-21 DIAGNOSIS — F41.9 ANXIETY AND DEPRESSION: Primary | ICD-10-CM

## 2019-01-21 DIAGNOSIS — F17.210 CIGARETTE NICOTINE DEPENDENCE WITHOUT COMPLICATION: ICD-10-CM

## 2019-01-21 DIAGNOSIS — E66.01 SEVERE OBESITY (BMI 35.0-39.9) WITH COMORBIDITY (HCC): ICD-10-CM

## 2019-01-21 RX ORDER — ALPRAZOLAM 1 MG/1
1 TABLET ORAL
Qty: 60 TAB | Refills: 2 | Status: SHIPPED | OUTPATIENT
Start: 2019-01-21 | End: 2019-04-22 | Stop reason: SDUPTHER

## 2019-01-21 RX ORDER — TRAZODONE HYDROCHLORIDE 100 MG/1
TABLET ORAL
Qty: 30 TAB | Refills: 1 | Status: SHIPPED | OUTPATIENT
Start: 2019-01-21 | End: 2019-04-22 | Stop reason: SDUPTHER

## 2019-01-21 RX ORDER — BUPROPION HYDROCHLORIDE 300 MG/1
300 TABLET ORAL
Qty: 30 TAB | Refills: 2 | Status: SHIPPED | OUTPATIENT
Start: 2019-01-21 | End: 2019-04-22 | Stop reason: SDUPTHER
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
## 2019-01-25 RX ORDER — TRAZODONE HYDROCHLORIDE 100 MG/1
TABLET ORAL
Qty: 45 TAB | Refills: 1 | OUTPATIENT
Start: 2019-01-25

## 2019-01-30 ENCOUNTER — HOSPITAL ENCOUNTER (OUTPATIENT)
Dept: GENERAL RADIOLOGY | Age: 69
Discharge: HOME | End: 2019-01-30
Payer: MEDICARE

## 2019-01-30 ENCOUNTER — OFFICE VISIT (OUTPATIENT)
Dept: INTERNAL MEDICINE CLINIC | Age: 69
End: 2019-01-30

## 2019-01-30 VITALS
HEART RATE: 79 BPM | RESPIRATION RATE: 14 BRPM | SYSTOLIC BLOOD PRESSURE: 129 MMHG | DIASTOLIC BLOOD PRESSURE: 67 MMHG | TEMPERATURE: 97 F | BODY MASS INDEX: 31.32 KG/M2 | HEIGHT: 65 IN | WEIGHT: 188 LBS | OXYGEN SATURATION: 98 %

## 2019-01-30 DIAGNOSIS — M17.11 PRIMARY OSTEOARTHRITIS OF RIGHT KNEE: ICD-10-CM

## 2019-01-30 DIAGNOSIS — G89.29 CHRONIC PAIN OF BOTH KNEES: ICD-10-CM

## 2019-01-30 DIAGNOSIS — R74.8 ELEVATED LIVER ENZYMES: Primary | ICD-10-CM

## 2019-01-30 DIAGNOSIS — M25.561 CHRONIC PAIN OF BOTH KNEES: ICD-10-CM

## 2019-01-30 DIAGNOSIS — R09.81 NASAL CONGESTION: ICD-10-CM

## 2019-01-30 DIAGNOSIS — M25.562 CHRONIC PAIN OF BOTH KNEES: ICD-10-CM

## 2019-01-30 DIAGNOSIS — E78.2 MIXED HYPERLIPIDEMIA: ICD-10-CM

## 2019-01-30 DIAGNOSIS — I10 ESSENTIAL HYPERTENSION WITH GOAL BLOOD PRESSURE LESS THAN 140/90: ICD-10-CM

## 2019-01-30 PROCEDURE — 73562 X-RAY EXAM OF KNEE 3: CPT

## 2019-01-30 RX ORDER — ICOSAPENT ETHYL 1000 MG/1
2 CAPSULE ORAL 2 TIMES DAILY
Qty: 360 CAP | Refills: 3 | Status: SHIPPED | OUTPATIENT
Start: 2019-01-30 | End: 2020-01-30 | Stop reason: SDUPTHER

## 2019-01-30 RX ORDER — IPRATROPIUM BROMIDE 42 UG/1
2 SPRAY, METERED NASAL 4 TIMES DAILY
Qty: 15 ML | Refills: 0 | Status: SHIPPED | OUTPATIENT
Start: 2019-01-30 | End: 2020-06-08

## 2019-01-30 RX ORDER — AMLODIPINE BESYLATE AND ATORVASTATIN CALCIUM 10; 80 MG/1; MG/1
1 TABLET, FILM COATED ORAL DAILY
Qty: 90 TAB | Refills: 3 | Status: SHIPPED | OUTPATIENT
Start: 2019-01-30 | End: 2020-01-30 | Stop reason: SDUPTHER

## 2019-01-30 RX ORDER — IBUPROFEN 800 MG/1
800 TABLET ORAL
Qty: 60 TAB | Refills: 3 | Status: SHIPPED | OUTPATIENT
Start: 2019-01-30 | End: 2019-07-30 | Stop reason: SDUPTHER

## 2019-01-30 NOTE — PATIENT INSTRUCTIONS
Also look up THE FAST METABOLISM DIET on CHING[employer]/Pinterest by Brayan Carrion. Aim to drink at least HALF OF YOUR BODYWEIGHT IN [place] Body mass index is 31.28 kg/m². Eat Breakfast DAILY within 30-60 minutes of WAKING and AIM to eat at least 5 TIMES DAILY, 3 meals and 2 snacks, about 2-3 hours apart. Start walking 3 TIMES WEEKLY for 20 minutes, as you get more comfortable increase your PACE and then the amount of TIME. The goal is 5 days weekly for 30 minutes. Learning About the [address] [facility] Diet  What is the Mediterranean diet? The Mediterranean diet is a style of eating rather than a diet plan. It features foods eaten in [place], [place], [place] and [place], and other countries along the [facility]. It emphasizes eating foods like fish, fruits, vegetables, beans, high-fiber breads and whole grains, nuts, and olive oil. This style of eating includes limited red meat, cheese, and sweets. Why choose the Mediterranean diet? A Mediterranean-style diet may improve heart health. It contains more fat than other heart-healthy diets. But the fats are mainly from nuts, unsaturated oils (such as fish oils and olive oil), and certain nut or seed oils (such as canola, soybean, or flaxseed oil). These fats may help protect the heart and blood vessels. How can you get started on the Mediterranean diet? Here are some things you can do to switch to a more Mediterranean way of eating. What to eat  · Eat a variety of fruits and vegetables each day, such as grapes, blueberries, tomatoes, broccoli, peppers, figs, olives, spinach, eggplant, beans, lentils, and chickpeas. · Eat a variety of whole-grain foods each day, such as oats, brown rice, and whole wheat bread, pasta, and couscous. · Eat fish at least 2 times a week. Try tuna, salmon, mackerel, lake trout, herring, or sardines. · Eat moderate amounts of low-fat dairy products, such as milk, cheese, or yogurt.   · Eat moderate amounts of poultry and eggs. · Choose healthy (unsaturated) fats, such as nuts, olive oil, and certain nut or seed oils like canola, soybean, and flaxseed. · Limit unhealthy (saturated) fats, such as butter, palm oil, and coconut oil. And limit fats found in animal products, such as meat and dairy products made with whole milk. Try to eat red meat only a few times a month in very small amounts. · Limit sweets and desserts to only a few times a week. This includes sugar-sweetened drinks like soda. The Mediterranean diet may also include red wine with your meal--1 glass each day for women and up to 2 glasses a day for men. Tips for eating at home  · Use herbs, spices, garlic, lemon zest, and citrus juice instead of salt to add flavor to foods. · Add avocado slices to your sandwich instead of luna. · Have fish for lunch or dinner instead of red meat. Brush the fish with olive oil, and broil or grill it. · Sprinkle your salad with seeds or nuts instead of cheese. · Cook with olive or canola oil instead of butter or oils that are high in saturated fat. · Switch from 2% milk or whole milk to 1% or fat-free milk. · Dip raw vegetables in a vinaigrette dressing or hummus instead of dips made from mayonnaise or sour cream.  · Have a piece of fruit for dessert instead of a piece of cake. Try baked apples, or have some dried fruit. Tips for eating out  · Try broiled, grilled, baked, or poached fish instead of having it fried or breaded. · Ask your  to have your meals prepared with olive oil instead of butter. · Order dishes made with marinara sauce or sauces made from olive oil. Avoid sauces made from cream or mayonnaise. · Choose whole-grain breads, whole wheat pasta and pizza crust, brown rice, beans, and lentils. · Cut back on butter or margarine on bread. Instead, you can dip your bread in a small amount of olive oil. · Ask for a side salad or grilled vegetables instead of french fries or chips.   Where can you learn more? Go to http://shraddha-giles.info/. Enter 202-825-4845 in the search box to learn more about \"Learning About the Mediterranean Diet. \"  Current as of: March 28, 2018  Content Version: 11.9  © 4204-4767 [employer], [employer]. Care instructions adapted under license by [employer] (which disclaims liability or warranty for this information). If you have questions about a medical condition or this instruction, always ask your healthcare professional. [address] any warranty or liability for your use of this information. Try to make adjustments in [facility] area by next office visit. ONLY DRINK WATER until your next office.      MOVEMENT-    Intake-     Sleep-    Stress-

## 2019-01-30 NOTE — PROGRESS NOTES
Pt Is here for   Chief Complaint   Patient presents with    Follow-up     HTN, CHOL, BMI     Pt states pain level is a 10/10 knees    1. Have you been to the ER, urgent care clinic since your last visit? Hospitalized since your last visit? No    2. Have you seen or consulted any other health care providers outside of the [address] [place] [address] since your last visit? Include any pap smears or colon screening.  No

## 2019-01-30 NOTE — PROGRESS NOTES
Marylou Godfrey is a 77 y.o. female and presents with Follow-up (HTN, CHOL, BMI)    Subjective:  Dyslipidemia Review:  Patient presents for evaluation of lipids. Compliance with treatment thus far has been good. Denies myalgias, slurring speech, unilateral weakness, and chest pain. A repeat fasting lipid profile was done/ordered. The patient does NOT use medications that may worsen dyslipidemias (corticosteroids, progestins, anabolic steroids, diuretics, beta-blockers, amiodarone, cyclosporine, olanzapine). The patient does try to exercise regularly. The patient is NOT known to have coexisting coronary artery disease. Taking Aspirin daily as prescribed/advised    Knee pain Review:  Patient complains of BILATERAL knee pain. Onset of the symptoms was years  ago. Inciting event: longstanding problem, followed by Dr. Fidencio Jacob with Q6 months cortisone injections and IBU. However ran out in Oct/November when she was supposed to return to Dr. Fidencio Jacob for another injection, but office closed. Reportedly no notice received on her end regarding the closure. Current symptoms include knee pain and swelling. Limiting activity. Aggravating symptoms: going up and down stairs, walking, lateral movements, touching, any weight bearing. Patient's overall course: stable. Patient has had prior knee problems. Evaluation to date: X-RAYS with ORTHO. Treatment to date:NSAIDS/Tylenol/Aspirin. Aching pain currently, Pain Scale: 10 - Worst pain ever/10. Hypertension Review:  The patient has hypertension  Diet and Lifestyle: generally does try to follow a  low sodium diet, exercises sporadically. Limited due to arthritic knee pain. Home BP Monitoring: is not measured at home. Pertinent ROS: taking medications as instructed, no medication side effects noted. No TIA's, chest pain on exertion, dyspnea on exertion, or swelling of ankles.    BP Readings from Last 3 Encounters:   01/30/19 129/67   01/21/19 116/71   10/17/18 140/82     Review of Systems  Constitutional: negative for fevers, chills, anorexia and weight loss  Respiratory:  +smoker, on wellbutrin, smoking 1/4 PPD. negative for hemoptysis, dyspnea, and wheezing  CV:   negative for chest pain, palpitations, and lower extremity edema  GI:   negative for nausea, vomiting, diarrhea, abdominal pain, and melena  Endo:               negative for polyuria,polydipsia,polyphagia, and heat intolerance  Genitourinary: negative for frequency, urgency, dysuria, retention, and hematuria  Integument:  negative for rash, ulcerations, and pruritus  Hematologic:  negative for easy bruising and bleeding  Musculoskel: negative for muscle weakness  Neurological:  negative for headaches, dizziness, vertigo,and memory problems  Behavl/Psych: negative for feelings of suicide, and mood changes    Past Medical History:   Diagnosis Date    Anxiety     Arthritis     Hypertension     Psychiatric disorder      History reviewed. No pertinent surgical history. Social History     Socioeconomic History    Marital status:      Spouse name: Not on file    Number of children: Not on file    Years of education: Not on file    Highest education level: Not on file   Tobacco Use    Smoking status: Current Every Day Smoker     Packs/day: 0.25    Smokeless tobacco: Never Used    Tobacco comment: down to 1/4 ppd from 1/2 ppd   Substance and Sexual Activity    Alcohol use: No    Drug use: No    Sexual activity: Not Currently     Family History   Problem Relation Age of Onset    No Known Problems Mother     No Known Problems Father      Current Outpatient Medications   Medication Sig Dispense Refill    ipratropium (ATROVENT) 42 mcg (0.06 %) nasal spray 2 Sprays by Both Nostrils route four (4) times daily. 15 mL 0    icosapent ethyl (VASCEPA) 1 gram capsule Take 2 Caps by mouth two (2) times a day. 360 Cap 3    amLODIPine-atorvastatin (CADUET) 10-80 mg per tablet Take 1 Tab by mouth daily.  90 Tab 3    ibuprofen (MOTRIN) 800 mg tablet Take 1 Tab by mouth every eight (8) hours as needed for Pain. 60 Tab 3    traZODone (DESYREL) 100 mg tablet TAKE ONE TABLET BY MOUTH ONCE NIGHTLY 30 Tab 1    ALPRAZolam (XANAX) 1 mg tablet Take 1 Tab by mouth two (2) times daily as needed for Anxiety. Max Daily Amount: 2 mg. 60 Tab 2    buPROPion XL (WELLBUTRIN XL) 300 mg XL tablet Take 1 Tab by mouth daily (after breakfast). 30 Tab 2    aspirin delayed-release 81 mg tablet Take 1 Tab by mouth daily. 30 Tab 11     Allergies   Allergen Reactions    Citalopram Other (comments)     Not effective    Prozac [Fluoxetine] Anxiety     Irritable, restless       Objective:  Visit Vitals  /67 (BP 1 Location: Right arm, BP Patient Position: Sitting)   Pulse 79   Temp 97 °F (36.1 °C) (Oral)   Resp 14   Ht 5' 5\" (1.651 m)   Wt 188 lb (85.3 kg)   SpO2 98%   BMI 31.28 kg/m²     Wt Readings from Last 3 Encounters:   01/30/19 188 lb (85.3 kg)   01/21/19 187 lb 3.2 oz (84.9 kg)   10/17/18 187 lb (84.8 kg)     Physical Exam:   General appearance - alert, well appearing, and in no distress. Mental status - A/O x 4, normal mood and affect. Neck -Supple ,normal CSP. FROM, non-tender. No significant adenopathy/thyromegaly. No JVD. Chest - Diminished in all lung fields. Symmetric chest rise. No rhonchi, wheezing, or rales. Heart - Normal rate, regular rhythm. Normal S1, S2. No MGR or clicks. Abdomen - Soft,non-distended. Normoactive BS in all quadrants. NT, no mass or HSM. Ext- Radial, DP pulses, 2+ bilaterally. No pedal edema, clubbing, or cyanosis. Skin-Warm and dry. No hyperpigmentation, ulcerations, or suspicious lesions. Neuro - Normal speech, no focal findings or movement disorder. Normal strength, gait, and muscle tone. Knees with gross edema to lateral and medial aspects. LROM. Very TTP, guarding. Assessment/Plan:  Imaging updated in [facility]. Pt offered inj at separate visit.  Pt distracted from acute knee pain so wt not addressed today, but pt aware of elevated BMI. Modified diet discussed to eliminate sodas for now. See below for other orders   Follow-up Disposition:  Return in about 6 months (around 2019) for HTN, CHOL, BMI. Knee inj whenever she wants to return. ICD-10-CM ICD-9-CM    1. Elevated liver enzymes R74.8 064.8 GGT      METABOLIC PANEL, COMPREHENSIVE   2. Nasal congestion R09.81 478.19 ipratropium (ATROVENT) 42 mcg (0.06 %) nasal spray   3. Mixed hyperlipidemia E78.2 272.2 icosapent ethyl (VASCEPA) 1 gram capsule      amLODIPine-atorvastatin (CADUET) 10-80 mg per tablet      LIPID PANEL   4. Essential hypertension with goal blood pressure less than 140/90 I10 401.9    5. Primary osteoarthritis of right knee M17.11 715.16 XR KNEE RT MAX 2 VWS      ibuprofen (MOTRIN) 800 mg tablet   6. Chronic pain of both knees M25.561 719.46 XR KNEE LT MAX 2 VWS    M25.562 338.29 XR KNEE RT MAX 2 VWS    G89.29  ibuprofen (MOTRIN) 800 mg tablet     Orders Placed This Encounter    XR KNEE LT MAX 2 VWS     Standing Status:   Future     Standing Expiration Date:   2020     Order Specific Question:   Reason for Exam     Answer:   bilateral knee pain    XR KNEE RT MAX 2 VWS     Standing Status:   Future     Standing Expiration Date:   2020     Order Specific Question:   Reason for Exam     Answer:   bilateral knee pain    GGT    METABOLIC PANEL, COMPREHENSIVE    LIPID PANEL    ipratropium (ATROVENT) 42 mcg (0.06 %) nasal spray     Si Sprays by Both Nostrils route four (4) times daily. Dispense:  15 mL     Refill:  0    icosapent ethyl (VASCEPA) 1 gram capsule     Sig: Take 2 Caps by mouth two (2) times a day. Dispense:  360 Cap     Refill:  3    amLODIPine-atorvastatin (CADUET) 10-80 mg per tablet     Sig: Take 1 Tab by mouth daily. Dispense:  90 Tab     Refill:  3    ibuprofen (MOTRIN) 800 mg tablet     Sig: Take 1 Tab by mouth every eight (8) hours as needed for Pain.      Dispense:  60 Tab Refill:  3       Machelle Novak expressed understanding of plan. An After Visit Summary was offered/printed and given to the patient.

## 2019-02-01 ENCOUNTER — OFFICE VISIT (OUTPATIENT)
Dept: INTERNAL MEDICINE CLINIC | Age: 69
End: 2019-02-01

## 2019-02-01 VITALS
WEIGHT: 188 LBS | RESPIRATION RATE: 17 BRPM | BODY MASS INDEX: 31.32 KG/M2 | TEMPERATURE: 96.7 F | SYSTOLIC BLOOD PRESSURE: 125 MMHG | HEIGHT: 65 IN | HEART RATE: 74 BPM | DIASTOLIC BLOOD PRESSURE: 73 MMHG | OXYGEN SATURATION: 96 %

## 2019-02-01 DIAGNOSIS — M25.561 CHRONIC PAIN OF BOTH KNEES: Primary | ICD-10-CM

## 2019-02-01 DIAGNOSIS — M95.8 OSTEOCHONDRAL DEFECT OF FEMORAL CONDYLE: ICD-10-CM

## 2019-02-01 DIAGNOSIS — M25.562 CHRONIC PAIN OF BOTH KNEES: Primary | ICD-10-CM

## 2019-02-01 DIAGNOSIS — G89.29 CHRONIC PAIN OF BOTH KNEES: Primary | ICD-10-CM

## 2019-02-01 RX ORDER — BETAMETHASONE SODIUM PHOSPHATE AND BETAMETHASONE ACETATE 3; 3 MG/ML; MG/ML
12 INJECTION, SUSPENSION INTRA-ARTICULAR; INTRALESIONAL; INTRAMUSCULAR; SOFT TISSUE ONCE
Qty: 1 VIAL | Refills: 0
Start: 2019-02-01 | End: 2019-02-01

## 2019-02-01 NOTE — PATIENT INSTRUCTIONS
Apply ice every 20 min for the next 3 hours, then 3 more times over the next 24 hours. Rest over the next 24-48 hours and gradually increase activity after that. Learning About Joint Injections  What are joint injections? Joint injections are shots into a joint, such as the knee or shoulder. They are used to put in medicines, such as pain relievers and steroid medicines. Steroids can be injected directly into a swollen and painful joint to reduce inflammation. A steroid shot can sometimes help with short-term pain relief when other treatments haven't worked. If steroid shots help, pain may improve for weeks or months. How are they done? First, the area over the joint will be cleaned. Your doctor may then use a tiny needle to numb the skin in the area where you will get the joint injection. If a tiny needle is used to numb the area, your doctor will use another needle to inject the medicine. Your doctor may use a pain reliever, a steroid, or both. You may feel some pressure or discomfort. The procedure takes 10 to 30 minutes. But the injection itself usually takes only a few minutes. Your doctor may put ice on the area before you go home. You will probably go home soon after your shot. What can you expect after a joint injection? You may have numbness around the joint for a few hours. If your shot included both a pain reliever and a steroid, then the pain will probably go away right away. But it might come back after a few hours. This might happen if the pain reliever wears off and the steroid hasn't started to work yet. Steroids don't always work. But when they do, the pain relief can last for several days to a few months or longer. Your doctor may tell you to use ice on the area. You can also use ice if the pain comes back. Put ice or a cold pack on your joint for 10 to 20 minutes at a time. Put a thin cloth between the ice and your skin.   Follow your doctor's instructions carefully. Follow-up care is a key part of your treatment and safety. Be sure to make and go to all appointments, and call your doctor if you are having problems. It's also a good idea to know your test results and keep a list of the medicines you take. Where can you learn more? Go to http://shraddha-giles.info/. Enter K402 in the search box to learn more about \"Learning About Joint Injections. \"  Current as of: September 20, 2018  Content Version: 11.9  © 7186-6365 [employer]. Care instructions adapted under license by [employer] (which disclaims liability or warranty for this information). If you have questions about a medical condition or this instruction, always ask your healthcare professional. [address] any warranty or liability for your use of this information.

## 2019-02-01 NOTE — PROGRESS NOTES
Pt is here for   Chief Complaint   Patient presents with    Injection     knees     Pt states pain level is a 10/10 knees    1. Have you been to the ER, urgent care clinic since your last visit? Hospitalized since your last visit? NO    2. Have you seen or consulted any other health care providers outside of the [address] [place] [address] since your last visit? Include any pap smears or colon screening.  No

## 2019-02-01 NOTE — PROGRESS NOTES
William Xiong is a 77 y.o. female and presents with Injection (knees)    Subjective:  Pt here for bilateral knee pain. Had xrays done and would like to know results. Also here for knee injections today, taken every 6 months for years with Dr. Maggy Jordan, but out of practice now. Pain improved some with use of IBU, but right knee swelling more today. Pain Scale: 10 - Worst pain ever/10. Still unable to cross legs and bending hurts. Review of Systems  Constitutional: negative for fevers, chills, anorexia and weight loss  Respiratory:  negative for cough, hemoptysis, dyspnea, and wheezing  CV:   negative for chest pain, palpitations, and lower extremity edema  GI:   negative for nausea, vomiting, diarrhea, abdominal pain, and melena  Endo:               negative for polyuria,polydipsia,polyphagia, and heat intolerance  Musculoskel: negative for muscle weakness  Neurological:  negative for headaches, dizziness, vertigo,and memory/gait problems  Behavl/Psych: negative for feelings of anxiety, depression, suicide, and mood changes    Past Medical History:   Diagnosis Date    Anxiety     Arthritis     Hypertension     Psychiatric disorder      History reviewed. No pertinent surgical history.   Social History     Socioeconomic History    Marital status:      Spouse name: Not on file    Number of children: Not on file    Years of education: Not on file    Highest education level: Not on file   Tobacco Use    Smoking status: Current Every Day Smoker     Packs/day: 0.25    Smokeless tobacco: Never Used    Tobacco comment: down to 1/4 ppd from 1/2 ppd   Substance and Sexual Activity    Alcohol use: No    Drug use: No    Sexual activity: Not Currently     Family History   Problem Relation Age of Onset    No Known Problems Mother     No Known Problems Father      Current Outpatient Medications   Medication Sig Dispense Refill    betamethasone (CELESTONE SOLUSPAN) 6 mg/mL injection 2 mL by Intra artICUlar route once for 1 dose. 1 Vial 0    ipratropium (ATROVENT) 42 mcg (0.06 %) nasal spray 2 Sprays by Both Nostrils route four (4) times daily. 15 mL 0    icosapent ethyl (VASCEPA) 1 gram capsule Take 2 Caps by mouth two (2) times a day. 360 Cap 3    amLODIPine-atorvastatin (CADUET) 10-80 mg per tablet Take 1 Tab by mouth daily. 90 Tab 3    ibuprofen (MOTRIN) 800 mg tablet Take 1 Tab by mouth every eight (8) hours as needed for Pain. 60 Tab 3    traZODone (DESYREL) 100 mg tablet TAKE ONE TABLET BY MOUTH ONCE NIGHTLY 30 Tab 1    ALPRAZolam (XANAX) 1 mg tablet Take 1 Tab by mouth two (2) times daily as needed for Anxiety. Max Daily Amount: 2 mg. 60 Tab 2    buPROPion XL (WELLBUTRIN XL) 300 mg XL tablet Take 1 Tab by mouth daily (after breakfast). 30 Tab 2    aspirin delayed-release 81 mg tablet Take 1 Tab by mouth daily. 30 Tab 11     Allergies   Allergen Reactions    Citalopram Other (comments)     Not effective    Prozac [Fluoxetine] Anxiety     Irritable, restless       Objective:  Visit Vitals  /73 (BP 1 Location: Left arm, BP Patient Position: Sitting)   Pulse 74   Temp 96.7 °F (35.9 °C) (Oral)   Resp 17   Ht 5' 5\" (1.651 m)   Wt 188 lb (85.3 kg)   SpO2 96%   BMI 31.28 kg/m²     Wt Readings from Last 3 Encounters:   02/01/19 188 lb (85.3 kg)   01/30/19 188 lb (85.3 kg)   01/21/19 187 lb 3.2 oz (84.9 kg)     Physical Exam:   General appearance - alert, well appearing, and in no distress. Mental status - A/O x 4,normal mood and affect. Chest - Symmetric chest rise. No wheezing. No distress. Heart - Normal rate. Abdomen- Soft, round. Non-distended, NT. No pulsatile masses or hernias. Ext- Radial, DP pulses, 2+ bilaterally. No pedal edema, clubbing, or cyanosis. Skin-Warm and dry. No hyperpigmentation, ulcerations, or suspicious lesions. Neuro - Normal speech, no focal findings or movement disorder. Normal strength, slow antalgic gait, and muscle tone. Right Knee- LROM.  Medial and lateral joint line tenderness. mild quadriceps tendonitis. no patellar tendonitis. Medial and lateral femoral epicondyle tenderness. no tibial plateau tenderness. Varus deformity. No erythema or effusions. +crepitus. no laxity. no Lachman's test.   Left Knee- LROM. medial joint line tenderness. mild quadriceps tendonitis. no patellar tendonitis. Medial and lateral femoral epicondyle tenderness. no tibial plateau tenderness. Varus deformity. No erythema or effusions. +crepitus. no laxity. no Lachman's test.     Assessment/Plan:  Indications:   Symptom relief from osteoarthritis    Procedure:  After consent was obtained, using sterile technique the BILATERAL KNEE joint was prepped using Alcohol pads. Local anesthetic used: 10 minutes CRYOTHERAPY with ice pack. Also sprayed with PainEase spray just prior to insertion of needle. Celestone 12 mg was mixed with 1% lidocaine 3 ml  and injected into the joint and the needle withdrawn. The procedure was well tolerated. The patient is asked to continue to rest the joint for a few more days before resuming regular activities. It may be more painful for the first 1-2 days. Watch for fever, or increased swelling or persistent pain in the joint. Call or return to clinic prn if such symptoms occur or there is failure to improve as anticipated.       [facility]  OFFICE PROCEDURE PROGRESS NOTE        Chart reviewed for the following:   Arsenio YOUNG NP, have reviewed the History, Physical and updated the Allergic reactions for Coretta D [address] [place] [address] performed immediately prior to start of procedure:   Arsenio YOUNG NP, have performed the following reviews on [facility] prior to the start of the procedure:            * Patient was identified by name and date of birth   * Agreement on procedure being performed was verified  * Risks and Benefits explained to the patient  * Procedure site verified and marked as necessary  * Patient was positioned for comfort       Time: 1115      Date of procedure: 2019    Procedure performed by: Lanette Bernal NP    Provider assisted by: none    Patient assisted by: self    How tolerated by patient: tolerated the procedure well with no complications    Medication Side Effects and Warnings were discussed with patient: yes   Patient Labs were reviewed: yes  Patient Past Records were reviewed: yes    See below for other orders   Follow-up Disposition:  Return if symptoms worsen or fail to improve. ICD-10-CM ICD-9-CM    1. Chronic pain of both knees M25.561 719.46 REFERRAL TO ORTHOPEDICS    M25.562 338.29 DRAIN/INJECT LARGE JOINT/BURSA    G89.29  betamethasone (CELESTONE SOLUSPAN) 6 mg/mL injection      BETAMETHASONE ACETATE & SODIUM PHOSPHATE INJECTION 3 MG EA.   2. Osteochondral defect of femoral condyle M95.8 738.8 REFERRAL TO ORTHOPEDICS      DRAIN/INJECT LARGE JOINT/BURSA      betamethasone (CELESTONE SOLUSPAN) 6 mg/mL injection      BETAMETHASONE ACETATE & SODIUM PHOSPHATE INJECTION 3 MG EA. Orders Placed This Encounter    DRAIN/INJECT LARGE JOINT/BURSA    REFERRAL TO ORTHOPEDICS     Referral Priority:   Routine     Referral Type:   Consultation     Referral Reason:   Specialty Services Required     Referral Location:   [facility]     Referred to Provider:   Bebe Buck MD    BETAMETHASONE ACETATE & SODIUM PHOSPHATE INJECTION 3 MG EA.  betamethasone (CELESTONE SOLUSPAN) 6 mg/mL injection     Si mL by Intra artICUlar route once for 1 dose. Dispense:  1 Vial     Refill:  0       William Lob expressed understanding of plan. An After Visit Summary was offered/printed and given to the patient.

## 2019-02-19 ENCOUNTER — HOSPITAL ENCOUNTER (OUTPATIENT)
Dept: MRI IMAGING | Age: 69
Discharge: HOME | End: 2019-02-19
Attending: ORTHOPAEDIC SURGERY
Payer: MEDICARE

## 2019-02-19 DIAGNOSIS — M25.562 LEFT KNEE PAIN: ICD-10-CM

## 2019-02-19 PROCEDURE — 73721 MRI JNT OF LWR EXTRE W/O DYE: CPT

## 2019-04-22 ENCOUNTER — OFFICE VISIT (OUTPATIENT)
Dept: BEHAVIORAL/MENTAL HEALTH CLINIC | Age: 69
End: 2019-04-22

## 2019-04-22 VITALS
HEIGHT: 65 IN | WEIGHT: 187 LBS | DIASTOLIC BLOOD PRESSURE: 83 MMHG | BODY MASS INDEX: 31.16 KG/M2 | HEART RATE: 74 BPM | SYSTOLIC BLOOD PRESSURE: 137 MMHG

## 2019-04-22 DIAGNOSIS — E66.01 SEVERE OBESITY (BMI 35.0-39.9) WITH COMORBIDITY (HCC): ICD-10-CM

## 2019-04-22 DIAGNOSIS — F17.210 CIGARETTE NICOTINE DEPENDENCE WITHOUT COMPLICATION: ICD-10-CM

## 2019-04-22 DIAGNOSIS — F32.A ANXIETY AND DEPRESSION: Primary | ICD-10-CM

## 2019-04-22 DIAGNOSIS — F41.9 ANXIETY AND DEPRESSION: Primary | ICD-10-CM

## 2019-04-22 RX ORDER — TRAZODONE HYDROCHLORIDE 100 MG/1
TABLET ORAL
Qty: 30 TAB | Refills: 3 | Status: SHIPPED | OUTPATIENT
Start: 2019-04-22 | End: 2019-08-20 | Stop reason: SDUPTHER

## 2019-04-22 RX ORDER — BUPROPION HYDROCHLORIDE 300 MG/1
300 TABLET ORAL
Qty: 30 TAB | Refills: 3 | Status: SHIPPED | OUTPATIENT
Start: 2019-04-22 | End: 2019-08-20 | Stop reason: SDUPTHER

## 2019-04-22 RX ORDER — ALPRAZOLAM 1 MG/1
1 TABLET ORAL
Qty: 60 TAB | Refills: 3 | Status: SHIPPED | OUTPATIENT
Start: 2019-04-22 | End: 2019-08-20 | Stop reason: SDUPTHER
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
## 2019-04-23 LAB
ALBUMIN SERPL-MCNC: 4.2 G/DL (ref 3.6–4.8)
ALBUMIN/GLOB SERPL: 1.4 {RATIO} (ref 1.2–2.2)
ALP SERPL-CCNC: 166 IU/L (ref 39–117)
ALT SERPL-CCNC: 10 IU/L (ref 0–32)
AST SERPL-CCNC: 14 IU/L (ref 0–40)
BILIRUB SERPL-MCNC: 0.2 MG/DL (ref 0–1.2)
BUN SERPL-MCNC: 11 MG/DL (ref 8–27)
BUN/CREAT SERPL: 14 (ref 12–28)
CALCIUM SERPL-MCNC: 10.2 MG/DL (ref 8.7–10.3)
CHLORIDE SERPL-SCNC: 100 MMOL/L (ref 96–106)
CHOLEST SERPL-MCNC: 182 MG/DL (ref 100–199)
CO2 SERPL-SCNC: 27 MMOL/L (ref 20–29)
CREAT SERPL-MCNC: 0.8 MG/DL (ref 0.57–1)
GGT SERPL-CCNC: 43 IU/L (ref 0–60)
GLOBULIN SER CALC-MCNC: 2.9 G/DL (ref 1.5–4.5)
GLUCOSE SERPL-MCNC: 83 MG/DL (ref 65–99)
HDLC SERPL-MCNC: 57 MG/DL
INTERPRETATION, 910389: NORMAL
LDLC SERPL CALC-MCNC: 99 MG/DL (ref 0–99)
POTASSIUM SERPL-SCNC: 3.8 MMOL/L (ref 3.5–5.2)
PROT SERPL-MCNC: 7.1 G/DL (ref 6–8.5)
SODIUM SERPL-SCNC: 143 MMOL/L (ref 134–144)
TRIGL SERPL-MCNC: 131 MG/DL (ref 0–149)
VLDLC SERPL CALC-MCNC: 26 MG/DL (ref 5–40)

## 2019-04-24 NOTE — PROGRESS NOTES
NML/Stable labs, no changes. Pt may schedule visit to review. Otherwise we will discuss at the next OV.

## 2019-07-30 ENCOUNTER — OFFICE VISIT (OUTPATIENT)
Dept: INTERNAL MEDICINE CLINIC | Age: 69
End: 2019-07-30

## 2019-07-30 VITALS
OXYGEN SATURATION: 95 % | BODY MASS INDEX: 32.49 KG/M2 | WEIGHT: 195 LBS | HEART RATE: 83 BPM | HEIGHT: 65 IN | DIASTOLIC BLOOD PRESSURE: 78 MMHG | RESPIRATION RATE: 17 BRPM | SYSTOLIC BLOOD PRESSURE: 130 MMHG | TEMPERATURE: 97 F

## 2019-07-30 DIAGNOSIS — I10 ESSENTIAL HYPERTENSION WITH GOAL BLOOD PRESSURE LESS THAN 140/90: ICD-10-CM

## 2019-07-30 DIAGNOSIS — Z71.89 ADVANCE CARE PLANNING: ICD-10-CM

## 2019-07-30 DIAGNOSIS — E78.2 MIXED HYPERLIPIDEMIA: ICD-10-CM

## 2019-07-30 DIAGNOSIS — Z12.39 ENCOUNTER FOR SCREENING FOR MALIGNANT NEOPLASM OF BREAST: ICD-10-CM

## 2019-07-30 DIAGNOSIS — M17.11 PRIMARY OSTEOARTHRITIS OF RIGHT KNEE: ICD-10-CM

## 2019-07-30 DIAGNOSIS — M25.561 CHRONIC PAIN OF BOTH KNEES: Primary | ICD-10-CM

## 2019-07-30 DIAGNOSIS — Z00.00 MEDICARE ANNUAL WELLNESS VISIT, SUBSEQUENT: ICD-10-CM

## 2019-07-30 DIAGNOSIS — Z87.891 FORMER SMOKER: ICD-10-CM

## 2019-07-30 DIAGNOSIS — Z23 ENCOUNTER FOR IMMUNIZATION: ICD-10-CM

## 2019-07-30 DIAGNOSIS — M25.562 CHRONIC PAIN OF BOTH KNEES: Primary | ICD-10-CM

## 2019-07-30 DIAGNOSIS — G89.29 CHRONIC PAIN OF BOTH KNEES: Primary | ICD-10-CM

## 2019-07-30 DIAGNOSIS — Z12.11 SPECIAL SCREENING FOR MALIGNANT NEOPLASMS, COLON: ICD-10-CM

## 2019-07-30 DIAGNOSIS — F17.210 CIGARETTE NICOTINE DEPENDENCE WITHOUT COMPLICATION: ICD-10-CM

## 2019-07-30 RX ORDER — IBUPROFEN 800 MG/1
800 TABLET ORAL
Qty: 60 TAB | Refills: 3 | Status: SHIPPED | OUTPATIENT
Start: 2019-07-30 | End: 2020-01-30 | Stop reason: SDUPTHER

## 2019-07-30 NOTE — PROGRESS NOTES
Pt is due to have surgery in September         Pt is here for   Chief Complaint   Patient presents with    Follow-up     6 month     Medication Refill     orders pending     Annual Wellness Visit     medicare     Pt denies pain at this time. 1. Have you been to the ER, urgent care clinic since your last visit? Hospitalized since your last visit? No    2. Have you seen or consulted any other health care providers outside of the [address] [place] [address] since your last visit? Include any pap smears or colon screening.  No

## 2019-07-30 NOTE — ACP (ADVANCE CARE PLANNING)
Advanced care planning- discussed Advance directive, Medical POA, and life sustaining options. Given/Mailing gonzales curiel paper work and contact info for [facility] facilitator/NN to complete paperwork in office. Advance Care Planning (ACP) Provider Conversation Snapshot    Date of ACP Conversation: 07/30/19  Persons included in Conversation:  Patient/family  Length of ACP Conversation in minutes:  5-10 minutes    Authorized Decision Maker (if patient is incapable of making informed decisions): This person is:   Healthcare Agent/Medical Power of  under Advance Directive            For Patients with Decision Making Capacity:   Values/Goals: Exploration of values, goals, and preferences if recovery is not expected, even with continued medical treatment in the event of:  Severe, permanent brain injury  \"In these circumstances, what matters most to you? \"  Care focused more on comfort or quality of life. Conversation Outcomes / Follow-Up Plan: DNR? Recommended completion of Advance Directive form after review of ACP materials and conversation with prospective healthcare agent   Referral made for ACP follow-up assistance to:  [facility] facilitator/ Nurse navigator      ====Gonzales Curiel Invitation====    Patient was invited to [facility] on this date and given the information (folder) for review. Recommended appointment with Gonzales lomeli for ACP conversation regarding advance directives. [x] Yes  [] No  Referral sent to [facility] team member or Coordinator for follow-up    [] Yes  [x] No  Patient scheduled an appointment. Site of Referral (type it here):  [facility]

## 2019-07-30 NOTE — PROGRESS NOTES
Laqueta Dandy is a 77 y.o. female and presents with Follow-up (6 month ); Medication Refill (orders pending ); and Annual Wellness Visit (medicare)    Subjective:  Pt here for MWV, med refill, and 6 month f/u for CHOL and HTN. Dyslipidemia Review:  Patient presents for evaluation of lipids. Compliance with treatment thus far has been good. Denies myalgias, slurring speech, unilateral weakness, and chest pain. A repeat fasting lipid profile was done/ordered. The patient does NOT use medications that may worsen dyslipidemias (corticosteroids, progestins, anabolic steroids, diuretics, beta-blockers, amiodarone, cyclosporine, olanzapine). The patient does try to exercise regularly. The patient is NOT known to have coexisting coronary artery disease. Taking Aspirin daily as prescribed/advised    Hypertension Review:  The patient has hypertension  Diet and Lifestyle: generally does try to follow a  low sodium diet, exercises sporadically. Pertinent ROS: taking medications as instructed, no medication side effects noted. No TIA's, chest pain on exertion, dyspnea on exertion, or swelling of ankles. BP Readings from Last 3 Encounters:   07/30/19 130/78   04/22/19 137/83   02/01/19 125/73     Also pt seen [facility] for bilateral knee pain. Will have procedure done in office at end of August to left knee for bone fragments and will await the right knee procedure for a few months. Left knee with more pain, taking IBU with great relief. Pain Scale: 0 - No pain/10. Exacerbated by rest and improved with activity. Lastly has NOT smoked for past 4 weeks, using patches and gum. Was smoking more than usual before quitting. Review of Systems  Constitutional: negative for fevers, chills, anorexia and weight loss  Respiratory:  +smoker, on wellbutrin, smoking 1/4 PPD.  negative for hemoptysis, dyspnea, and wheezing  CV:   negative for chest pain, palpitations, and lower extremity edema  GI:   negative for nausea, vomiting, diarrhea, abdominal pain, and melena  Endo:               negative for polyuria,polydipsia,polyphagia, and heat intolerance  Genitourinary: negative for frequency, urgency, dysuria, retention, and hematuria  Integument:  negative for rash, ulcerations, and pruritus  Hematologic:  negative for easy bruising and bleeding  Musculoskel: negative for muscle weakness  Neurological:  negative for headaches, dizziness, vertigo,and memory problems  Behavl/Psych: negative for feelings of suicide, and mood changes    Past Medical History:   Diagnosis Date    Anxiety     Arthritis     Hypertension     Psychiatric disorder      No past surgical history on file. Social History     Socioeconomic History    Marital status:      Spouse name: Not on file    Number of children: Not on file    Years of education: Not on file    Highest education level: Not on file   Tobacco Use    Smoking status: Former Smoker     Packs/day: 0.25     Types: Cigarettes     Last attempt to quit: 2019     Years since quittin.0    Smokeless tobacco: Never Used    Tobacco comment: pt states that she Quit 4 weeks ago    Substance and Sexual Activity    Alcohol use: No    Drug use: No    Sexual activity: Not Currently     Family History   Problem Relation Age of Onset    No Known Problems Mother     No Known Problems Father      Current Outpatient Medications   Medication Sig Dispense Refill    ibuprofen (MOTRIN) 800 mg tablet Take 1 Tab by mouth every eight (8) hours as needed for Pain. 60 Tab 3    varicella-zoster recombinant, PF, (SHINGRIX, PF,) 50 mcg/0.5 mL susr injection Pharmacy to administer 0.5 mL. 0.5 mL 1    traZODone (DESYREL) 100 mg tablet TAKE ONE TABLET BY MOUTH ONCE NIGHTLY 30 Tab 3    ALPRAZolam (XANAX) 1 mg tablet Take 1 Tab by mouth two (2) times daily as needed for Anxiety. Max Daily Amount: 2 mg.  Indications: Anxiousness associated with Depression 60 Tab 3    buPROPion XL (WELLBUTRIN XL) 300 mg XL tablet Take 1 Tab by mouth daily (after breakfast). Indications: Anxiousness associated with Depression 30 Tab 3    ipratropium (ATROVENT) 42 mcg (0.06 %) nasal spray 2 Sprays by Both Nostrils route four (4) times daily. 15 mL 0    icosapent ethyl (VASCEPA) 1 gram capsule Take 2 Caps by mouth two (2) times a day. 360 Cap 3    amLODIPine-atorvastatin (CADUET) 10-80 mg per tablet Take 1 Tab by mouth daily. 90 Tab 3    aspirin delayed-release 81 mg tablet Take 1 Tab by mouth daily. 30 Tab 11     Allergies   Allergen Reactions    Citalopram Other (comments)     Not effective    Prozac [Fluoxetine] Anxiety     Irritable, restless       Objective:  Visit Vitals  /78 (BP 1 Location: Left arm, BP Patient Position: Sitting)   Pulse 83   Temp 97 °F (36.1 °C) (Oral)   Resp 17   Ht 5' 5\" (1.651 m)   Wt 195 lb (88.5 kg)   SpO2 95%   BMI 32.45 kg/m²     Wt Readings from Last 3 Encounters:   07/30/19 195 lb (88.5 kg)   04/22/19 187 lb (84.8 kg)   02/01/19 188 lb (85.3 kg)     Physical Exam:   General appearance - alert, well appearing, and in no distress. Mental status - A/O x 4, normal mood and affect. Neck -Supple ,normal CSP. FROM, non-tender. No significant adenopathy/thyromegaly. No JVD. Chest - Diminished in all lung fields. Symmetric chest rise. No rhonchi, wheezing, or rales. Heart - Normal rate, regular rhythm. Normal S1, S2. No MGR or clicks. Abdomen - Soft,non-distended. Normoactive BS in all quadrants. NT, no mass or HSM. Ext- Radial, DP pulses, 2+ bilaterally. No pedal edema, clubbing, or cyanosis. Skin-Warm and dry. No hyperpigmentation, ulcerations, or suspicious lesions. Neuro - Normal speech, no focal findings or movement disorder. Normal strength, gait, and muscle tone. Knees with gross edema to lateral and medial aspects. LROM. Very TTP, guarding.      Assessment of cognitive impairment: Alert and oriented x 4    Depression Screen:   3 most recent PHQ Screens 7/30/2019   PHQ Not Done -   Little interest or pleasure in doing things Not at all   Feeling down, depressed, irritable, or hopeless Not at all   Total Score PHQ 2 0       Fall Risk Assessment:    Fall Risk Assessment, last 12 mths 7/30/2019   Able to walk? Yes   Fall in past 12 months? No       Abuse Assessment: Patient NOT domesticly abuse (verbal, physical, and financial)    Current Alcohol use: NONE   reports that she does not drink alcohol. Functional Ability:   Does the patient exhibit a steady gait? Yes   How long did it take the patient to get up and walk from a sitting position? 4 seconds   Is the patient self reliant?  (ie can do own laundry, meals, household chores) yes     Does the patient handle his/her own medications? yes     Does the patient handle his/her own money? Yes     Is the patients home safe (ie good lighting, handrails on stairs and bath, etc.)? Yes   Did you notice or did patient express any hearing difficulties? no   Did you notice of did patient express any vision difficulties?  no   Were distance and reading eye charts used? n/a     Advance Care Planning:   Patient was offered the opportunity to discuss advance care planning:  Yes   Does patient have an Advance Directive: No   If no, did you provide information and forms? yes     Health Maintenance   Topic Date Due    Cologuard Q 3 Years  01/21/2001    GLAUCOMA SCREENING Q2Y  10/30/2019 (Originally 7/1/2017)    Shingrix Vaccine Age 50> (1 of 2) 06/11/2020 (Originally 1/21/2001)    Pneumococcal 65+ years (2 of 2 - PCV13) 07/30/2020 (Originally 1/29/2019)    Influenza Age 5 to Adult  08/01/2019    MEDICARE YEARLY EXAM  07/30/2020    BREAST CANCER SCRN MAMMOGRAM  08/22/2020    DTaP/Tdap/Td series (2 - Td) 06/26/2025    Hepatitis C Screening  Completed    Bone Densitometry (Dexa) Screening  Completed     Assessment/Plan:  Smoking cessation discussed for 3-10 minutes, has RECENTLY quit, using patches and gum meds today.  Discussed the patient's BMI with her. The BMI follow up plan is as follows:     I have reviewed/discussed the above normal BMI (Body mass index is 32.45 kg/m².) with the patient. I have recommended the following interventions: encourage exercise, monitor weight, and dietary management education, guidance, and counseling, . See below for other orders   Follow-up and Dispositions    · Return in about 6 months (around 1/30/2020) for CHOL, HTN with labs. ICD-10-CM ICD-9-CM    1. Chronic pain of both knees M25.561 719.46 ibuprofen (MOTRIN) 800 mg tablet    M25.562 338.29     G89.29     2. Primary osteoarthritis of right knee M17.11 715.16 ibuprofen (MOTRIN) 800 mg tablet   3. Special screening for malignant neoplasms, colon Z12.11 V76.51 COLOGUARD TEST (FECAL DNA COLORECTAL CANCER SCREENING)   4. Encounter for screening for malignant neoplasm of breast Z12.31 V76.10 [facility] MAMMO BI SCREENING INCL CAD   5. Encounter for immunization Z23 V03.89 varicella-zoster recombinant, PF, (SHINGRIX, PF,) 50 mcg/0.5 mL susr injection   6. Advance care planning Z71.89 V65.49    7. Medicare annual wellness visit, subsequent Z00.00 V70.0    8. Cigarette nicotine dependence without complication B81.503 234.1    9. Former smoker Z87.891 V15.82    8. Mixed hyperlipidemia E78.2 272.2    11. Essential hypertension with goal blood pressure less than 140/90 I10 401.9    12. BMI 32.0-32.9,adult Z68.32 V85.32      Orders Placed This Encounter    ANA MAMMO BI SCREENING INCL CAD     Standing Status:   Future     Standing Expiration Date:   8/30/2020     Order Specific Question:   Reason for Exam     Answer:   breast cancer screening    COLOGUARD TEST (FECAL DNA COLORECTAL CANCER SCREENING)    ibuprofen (MOTRIN) 800 mg tablet     Sig: Take 1 Tab by mouth every eight (8) hours as needed for Pain. Dispense:  60 Tab     Refill:  3    varicella-zoster recombinant, PF, (SHINGRIX, PF,) 50 mcg/0.5 mL susr injection     Sig: Pharmacy to administer 0.5 mL. Dispense:  0.5 mL     Refill:  1       Carlotta Dao expressed understanding of plan. An After Visit Summary was offered/printed and given to the patient.

## 2019-07-30 NOTE — PATIENT INSTRUCTIONS
Advance Directives: Care Instructions  Your Care Instructions  An advance directive is a legal way to state your wishes at the end of your life. It tells your family and your doctor what to do if you can no longer say what you want. There are two main types of advance directives. You can change them any time that your wishes change. · A living will tells your family and your doctor your wishes about life support and other treatment. · A durable power of  for health care lets you name a person to make treatment decisions for you when you can't speak for yourself. This person is called a health care agent. If you do not have an advance directive, decisions about your medical care may be made by a doctor or a  who doesn't know you. It may help to think of an advance directive as a gift to the people who care for you. If you have one, they won't have to make tough decisions by themselves. Follow-up care is a key part of your treatment and safety. Be sure to make and go to all appointments, and call your doctor if you are having problems. It's also a good idea to know your test results and keep a list of the medicines you take. How can you care for yourself at home? · Discuss your wishes with your loved ones and your doctor. This way, there are no surprises. · Many states have a unique form. Or you might use a universal form that has been approved by many states. This kind of form can sometimes be completed and stored online. Your electronic copy will then be available wherever you have a connection to the Internet. In most cases, doctors will respect your wishes even if you have a form from a different state. · You don't need a  to do an advance directive. But you may want to get legal advice. · Think about these questions when you prepare an advance directive:  ? Who do you want to make decisions about your medical care if you are not able to?  Many people choose a family member or close friend. ? Do you know enough about life support methods that might be used? If not, talk to your doctor so you understand. ? What are you most afraid of that might happen? You might be afraid of having pain, losing your independence, or being kept alive by machines. ? Where would you prefer to die? Choices include your home, a hospital, or a nursing home. ? Would you like to have information about hospice care to support you and your family? ? Do you want to donate organs when you die? ? Do you want certain Muslim practices performed before you die? If so, put your wishes in the advance directive. · Read your advance directive every year, and make changes as needed. When should you call for help? Be sure to contact your doctor if you have any questions. Where can you learn more? Go to http://shraddha-giles.info/. Enter R264 in the search box to learn more about \"Advance Directives: Care Instructions. \"  Current as of: April 1, 2019  Content Version: 12.1  © 9583-8687 [employer]. Care instructions adapted under license by [employer] (which disclaims liability or warranty for this information). If you have questions about a medical condition or this instruction, always ask your healthcare professional. [place] [address] any warranty or liability for your use of this information. Arthritis: Care Instructions  Your Care Instructions  Arthritis, also called osteoarthritis, is a breakdown of the cartilage that cushions your joints. When the cartilage wears down, your bones rub against each other. This causes pain and stiffness. Many people have some arthritis as they age. Arthritis most often affects the joints of the spine, hands, hips, knees, or feet. You can take simple measures to protect your joints, ease your pain, and help you stay active. Follow-up care is a key part of your treatment and safety.  Be sure to make and go to all appointments, and call your doctor if you are having problems. It's also a good idea to know your test results and keep a list of the medicines you take. How can you care for yourself at home? · Stay at a healthy weight. Being overweight puts extra strain on your joints. · Talk to your doctor or physical therapist about exercises that will help ease joint pain. ? Stretch. You may enjoy gentle forms of yoga to help keep your joints and muscles flexible. ? Walk instead of jog. Other types of exercise that are less stressful on the joints include riding a bicycle, swimming, brittni chi, or water exercise. ? Lift weights. Strong muscles help reduce stress on your joints. Stronger thigh muscles, for example, take some of the stress off of the knees and hips. Learn the right way to lift weights so you do not make joint pain worse. · Take your medicines exactly as prescribed. Call your doctor if you think you are having a problem with your medicine. · Take pain medicines exactly as directed. ? If the doctor gave you a prescription medicine for pain, take it as prescribed. ? If you are not taking a prescription pain medicine, ask your doctor if you can take an over-the-counter medicine. · Use a cane, crutch, walker, or another device if you need help to get around. These can help rest your joints. You also can use other things to make life easier, such as a higher toilet seat and padded handles on kitchen utensils. · Do not sit in low chairs, which can make it hard to get up. · Put heat or cold on your sore joints as needed. Use whichever helps you most. You also can take turns with hot and cold packs. ? Apply heat 2 or 3 times a day for 20 to 30 minutes--using a heating pad, hot shower, or hot pack--to relieve pain and stiffness. ? Put ice or a cold pack on your sore joint for 10 to 20 minutes at a time. Put a thin cloth between the ice and your skin. When should you call for help?   Call your doctor now or seek immediate medical care if:    · You have sudden swelling, warmth, or pain in any joint.     · You have joint pain and a fever or rash.     · You have such bad pain that you cannot use a joint.    Watch closely for changes in your health, and be sure to contact your doctor if:    · You have mild joint symptoms that continue even with more than 6 weeks of care at home.     · You have stomach pain or other problems with your medicine. Where can you learn more? Go to http://shraddha-giles.info/. Enter D982 in the search box to learn more about \"Arthritis: Care Instructions. \"  Current as of: April 1, 2019  Content Version: 12.1  © 8040-7662 [employer]. Care instructions adapted under license by [employer] (which disclaims liability or warranty for this information). If you have questions about a medical condition or this instruction, always ask your healthcare professional. [place] [address] any warranty or liability for your use of this information. Colon Cancer Screening: Care Instructions  Your Care Instructions    Colorectal cancer occurs in the colon or rectum. That's the lower part of your digestive system. It is the second-leading cause of cancer deaths in the [place]. It often starts with small growths called polyps in the colon or rectum. Polyps are usually found with screening tests. Depending on the type of test, any polyps found may be removed during the tests. Colorectal cancer usually does not cause symptoms at first. But regular tests can help find it early, before it spreads and becomes harder to treat. Your risk for colorectal cancer gets higher as you get older. Some experts say that adults should start regular screening at age 48 and stop at age 76. Others say to start before age 48 or continue after age 76. Talk with your doctor about your risk and when to start and stop screening.  You may have one of several tests.  Follow-up care is a key part of your treatment and safety. Be sure to make and go to all appointments, and call your doctor if you are having problems. It's also a good idea to know your test results and keep a list of the medicines you take. What are the main screening tests for colon cancer? · Stool tests. These include the fecal immunochemical test (FIT) and the fecal occult blood test (FOBT). These tests check stool samples for signs of cancer. If your test is positive, you will need to have a colonoscopy. · Sigmoidoscopy. This test lets your doctor look at the lining of your rectum and the lowest part of your colon. Your doctor uses a lighted tube called a sigmoidoscope. This test can't find cancers or polyps in the upper part of your colon. In some cases, polyps that are found can be removed. But if your doctor finds polyps, you will need to have a colonoscopy to check the upper part of your colon. · Colonoscopy. This test lets your doctor look at the lining of your rectum and your entire colon. The doctor uses a thin, flexible tool called a colonoscope. It can also be used to remove polyps or get a tissue sample (biopsy). What tests do you need? The following guidelines are for adults who are not at high risk for colorectal cancer. You may have at least one of these tests as directed by your doctor. · Fecal immunochemical test (FIT) or guaiac fecal occult blood test (gFOBT) every year  · Sigmoidoscopy every 5 years  · Colonoscopy every 10 years  If you are over age 76, you can work with your doctor to decide if screening is a good option. Talk with your doctor about when you need to be tested. And discuss which tests are right for you. Your doctor may recommend earlier or more frequent testing if you:  · Have had colorectal cancer before. · Have had colon polyps. · Have symptoms of colorectal cancer. These include blood in your stool and changes in your bowel habits.   · Have a parent, brother or sister, or child with colon polyps or colorectal cancer. · Have a bowel disease. This includes ulcerative colitis and Crohn's disease. · Have a rare polyp syndrome that runs in families, such as familial adenomatous polyposis (FAP). · Have had radiation treatments to the belly or pelvis. When should you call for help? Watch closely for changes in your health, and be sure to contact your doctor if:    · You have any changes in your bowel habits.     · You have any problems. Where can you learn more? Go to http://shraddha-giles.info/. Enter M541 in the search box to learn more about \"Colon Cancer Screening: Care Instructions. \"  Current as of: December 19, 2018  Content Version: 12.1  © 1094-7798 [employer]. Care instructions adapted under license by [employer] (which disclaims liability or warranty for this information). If you have questions about a medical condition or this instruction, always ask your healthcare professional. [place] [address] any warranty or liability for your use of this information.

## 2019-08-09 ENCOUNTER — TELEPHONE (OUTPATIENT)
Dept: INTERNAL MEDICINE CLINIC | Age: 69
End: 2019-08-09

## 2019-08-09 NOTE — TELEPHONE ENCOUNTER
PT called today stating  She got a call from someone about a colonoscopy. They told her the ins co may not pay for it. They were asking for her credit card information and she gave it to them along with the expiration date. Then she realized they should  not be asking her  these questions. She states she never received anything in the mail for a test. Is this something you ordered for her.   Pt # [phone]

## 2019-08-09 NOTE — TELEPHONE ENCOUNTER
We ordered COLOGUARD, but [facility] [employer] SHOULD cover it. They usually send out the box to my knowledge. She can call her insurance company or call [phone] with any questions to [employer] directly. She can also see if this is who called for her card info.

## 2019-08-20 DIAGNOSIS — F32.A ANXIETY AND DEPRESSION: ICD-10-CM

## 2019-08-20 DIAGNOSIS — F41.9 ANXIETY AND DEPRESSION: ICD-10-CM

## 2019-08-21 DIAGNOSIS — F32.A ANXIETY AND DEPRESSION: ICD-10-CM

## 2019-08-21 DIAGNOSIS — F41.9 ANXIETY AND DEPRESSION: ICD-10-CM

## 2019-08-21 RX ORDER — BUPROPION HYDROCHLORIDE 300 MG/1
300 TABLET ORAL
Qty: 30 TAB | Refills: 0 | Status: SHIPPED | OUTPATIENT
Start: 2019-08-21 | End: 2019-09-19 | Stop reason: SDUPTHER

## 2019-08-21 RX ORDER — TRAZODONE HYDROCHLORIDE 100 MG/1
TABLET ORAL
Qty: 30 TAB | Refills: 0 | Status: SHIPPED | OUTPATIENT
Start: 2019-08-21 | End: 2019-09-19 | Stop reason: SDUPTHER

## 2019-08-21 RX ORDER — ALPRAZOLAM 1 MG/1
1 TABLET ORAL
Qty: 60 TAB | Refills: 0 | Status: SHIPPED | OUTPATIENT
Start: 2019-08-21 | End: 2019-09-19 | Stop reason: SDUPTHER

## 2019-08-22 RX ORDER — ALPRAZOLAM 1 MG/1
TABLET ORAL
Qty: 60 TAB | Refills: 2 | OUTPATIENT
Start: 2019-08-22

## 2019-08-27 ENCOUNTER — HOSPITAL ENCOUNTER (OUTPATIENT)
Dept: MAMMOGRAPHY | Age: 69
Discharge: HOME | End: 2019-08-27
Attending: NURSE PRACTITIONER
Payer: MEDICARE

## 2019-08-27 DIAGNOSIS — Z12.39 ENCOUNTER FOR SCREENING FOR MALIGNANT NEOPLASM OF BREAST: ICD-10-CM

## 2019-08-27 PROCEDURE — 77067 SCR MAMMO BI INCL CAD: CPT

## 2019-09-06 ENCOUNTER — DOCUMENTATION ONLY (OUTPATIENT)
Dept: INTERNAL MEDICINE CLINIC | Age: 69
End: 2019-09-06

## 2019-09-09 NOTE — PROGRESS NOTES
They usually send a written notification, but it is supposed to post to the test via CC now. However, I typically send for colonoscopy when I am made aware. Do we know if we have a PRINTOUT of this positive result somewhere?

## 2019-09-11 DIAGNOSIS — R19.5 POSITIVE COLORECTAL CANCER SCREENING USING COLOGUARD TEST: Primary | ICD-10-CM

## 2019-09-11 NOTE — PROGRESS NOTES
I just submitted the request to access the site to print it off, they said they sent it over, but I don't remember seeing it.

## 2019-09-13 ENCOUNTER — DOCUMENTATION ONLY (OUTPATIENT)
Dept: INTERNAL MEDICINE CLINIC | Age: 69
End: 2019-09-13

## 2019-09-16 ENCOUNTER — TELEPHONE (OUTPATIENT)
Dept: INTERNAL MEDICINE CLINIC | Age: 69
End: 2019-09-16

## 2019-09-19 NOTE — TELEPHONE ENCOUNTER
Referral number #[id number] to Dr. Hernandez Fitting for colonoscopy due to positive cologuard screening Dx R19.5 for 7 visits.  Faxed to Dr. Cheryl Chow office attn Referral Coordinator

## 2019-09-30 ENCOUNTER — OFFICE VISIT (OUTPATIENT)
Dept: BEHAVIORAL/MENTAL HEALTH CLINIC | Age: 69
End: 2019-09-30

## 2019-09-30 VITALS
HEART RATE: 80 BPM | SYSTOLIC BLOOD PRESSURE: 148 MMHG | WEIGHT: 197.2 LBS | DIASTOLIC BLOOD PRESSURE: 88 MMHG | TEMPERATURE: 98.8 F | BODY MASS INDEX: 32.82 KG/M2

## 2019-09-30 DIAGNOSIS — F33.1 DEPRESSION, MAJOR, RECURRENT, MODERATE (HCC): Primary | ICD-10-CM

## 2019-09-30 DIAGNOSIS — F41.9 ANXIETY AND DEPRESSION: ICD-10-CM

## 2019-09-30 DIAGNOSIS — F41.9 ANXIETY: ICD-10-CM

## 2019-09-30 DIAGNOSIS — G47.00 INSOMNIA, UNSPECIFIED TYPE: ICD-10-CM

## 2019-09-30 DIAGNOSIS — F32.A ANXIETY AND DEPRESSION: ICD-10-CM

## 2019-09-30 RX ORDER — ALPRAZOLAM 1 MG/1
1 TABLET ORAL
Qty: 60 TAB | Refills: 2 | Status: SHIPPED | OUTPATIENT
Start: 2019-09-30 | End: 2019-12-12 | Stop reason: SDUPTHER

## 2019-09-30 RX ORDER — TRAZODONE HYDROCHLORIDE 100 MG/1
TABLET ORAL
Qty: 90 TAB | Refills: 0 | Status: SHIPPED | OUTPATIENT
Start: 2019-09-30 | End: 2019-12-12 | Stop reason: SDUPTHER

## 2019-09-30 RX ORDER — BUPROPION HYDROCHLORIDE 300 MG/1
300 TABLET ORAL
Qty: 90 TAB | Refills: 0 | Status: SHIPPED | OUTPATIENT
Start: 2019-09-30 | End: 2019-12-12 | Stop reason: SDUPTHER

## 2019-09-30 NOTE — PROGRESS NOTES
Psychiatric Outpatient Progress Note    Account Number:  [id number]  Name: Jaquan Mott    SUBJECTIVE:   CHIEF COMPLAINT:  Leah Rod is a 69 y.o. , AA female and was seen today for  follow-up of psychiatric condition and psychotropic medication management. Received treatment from Dr Esequiel Phelps MD since April 2017. Maeve Larrybs Patient was transferred as her previous provider Dr Esequiel Phelps MD,  has  left the practice. Last office visit was April 2019.        HPI:  Coretta reports the following psychiatric symptoms:  depression, anxiety and caregiver stress.  The symptoms have been present for years and are of moderate severity. The symptoms occur infrequently. Reported has medical changes- has scheduled colonoscopy r/t fili. h/o constipation. She is worried about it. She has stopped smoking. And using nicorette gum and patch. reported her appetite is fair, has interest, has motivation and able to focus and concenrtrate. She was   Pt reports that she has found a way to get out of her house 3-4 hours per day, away from her abusive  hs alzheimer's and on dialysis and she cares for him. She cares for her niece with her kids. This is keeping her more sane than before as her  has been driving her crazy with his somatizations . he calls on phone numerous times. She has been caring for 3 years. She is looking for nursing home. She feels relaxed with grand kids and enjoys it. On Tuesday Thursday and Saturday, her  goes for dialysis and that gives her some relief as well. Overall feeling much better with stable sleep and appetite. Denies any psychosis or arlene. Reports compliance with medications.      Contributing factors include: caregiver burden.       Patient denies SI/HI/SIB.     Side Effects:  none       History and HPI Copied from Dr Julieta Kaye  initial visit note and addended  Mat Both is a 78 y.o.  , AA female who presents with long h/o MDD, Anxiety, remote h/o THC use and multiple medical problems was referrec by her primary care NP to establish her [place] [address] care here as her last psychiatrist, Dr. Nilay Healy retired recently. Pt is currently stable on trazodone and Xanax for about a year. Her NP gave her Zoloft recently which she did not tolerate and is not taking it. Pt reports that she got into [place] [address] treatment long time ago with Dr. Efra Dimas, whom she saw for years here in [address] After him she saw Dr. Belinda Muñoz for years and after his halfway few years ago she saw Dr. Nilay Healy. In the past she was on lorazepam for years which was changed to Xanax by Dr. Belinda Muñoz and has worked well. She does not recall any other psych medications. She has never been hospitalized for MH reasons. Denies any long term illicit drugs or alcohol use d/o. Pt's current stressors include 's dementia related aggressive behaviors towards her. He has been suffering from dementia for last few years and also has multiple medical problems. She has some support from her cousin and grand son in providing 24/7 care for him. Pt reports she stays depressed, anxious and tired all the time. Her smoking has gone up. Sleep is OK. Eating fairly well. Denies any SI or HI. No psychosis or arlene. Denies any nightmares or flashbacks. Denies any obsession or compulsions      Past Psychiatric History:      · Previous psychiatric care: admits  · As per HPI     · Previous suicide attempts: none     · Previous Hospitalizations: denies  · none     · Previous psychiatric medications/ECT/TMS: admits  · As per HPI.  No ECT or TMS          History of rehab, detox, withdrawal: none     Social History:   Social History            Social History    Marital status:        Spouse name: N/A    Number of children: N/A    Years of education: N/A             Social History Main Topics    Smoking status: Current Every Day Smoker       Packs/day: 0.50    Smokeless tobacco: None         Comment: down to 1/2 ppd from 1 ppd    Alcohol use No    Drug use: No    Sexual activity: Not Asked           Other Topics Concern    None      Social History Narrative         Ethnic:   Relationship Status:  x 51 years. Has 2 adult children  Living Situation: With spouse   Employment: retired  Tobacco/Caffeine/Alchol use: smokes 1/2 ppd  Illicit Drug Social History:  Remote h/o THC use  Hobbies:  TV  Sexual:  not sexually active     Family History:         Family History   Problem Relation Age of Onset    No Known Problems Mother      No Known Problems Father           Past Medical History:        Past Medical History:   Diagnosis Date    Anxiety      Arthritis      Hypertension      Psychiatric disorder          Fam/Soc Hx (from [facility] Eval with updates):    Ethnic:   Relationship Status:  x 51 years.  Has 2 adult children  Living Situation: With spouse   Employment: retired  Tobacco/Caffeine/Alchol use: smokes 1/2 ppd  Illicit Drug Social History:  Remote h/o THC use  Hobbies:  TV  Sexual:  not sexually active      REVIEW OF SYSTEMS:  Constitutional: positive for energy  Eyes: positive for contacts/glasses  Ears, nose, mouth, throat, and face: negative for hearing loss and tinnitus  Respiratory: positive for cough  Neurological: negative for headaches and seizures  Behavioral/Psych: positive for anxiety, negative for SI or HI  Appetite: fair  Sleep: poor    SCALES (4/10/17):  MOCA: 28/30  GDS:5/15     Visit Vitals  /88   Pulse 80   Temp 98.8 °F (37.1 °C)   Wt 89.4 kg (197 lb 3.2 oz)   BMI 32.82 kg/m²       OBJECTIVE:                 Mental Status exam: WNL except for      Sensorium  oriented to time, place and person   Relations cooperative and passive    Eye Contact    appropriate   Appearance:  age appropriate, casually dressed and overweight   Motor Behavior/Gait:  within normal limits   Speech:  normal pitch and normal volume   Thought Process: goal directed and logical   Thought Content free of delusions and free of hallucinations   Suicidal ideations none   Homicidal ideations none   Mood:  anxious   Affect:  Anxious and mood congruent   Memory recent  adequate   Memory remote:  adequate   Concentration:  adequate   Abstraction:  concrete   Insight:  fair   Reliability fair   Judgment:  fair       MEDICAL DECISION MAKING  Data: pertinent labs, imaging, medical records and diagnostic tests reviewed and incorporated in diagnosis and treatment plan    Allergies   Allergen Reactions    Citalopram Other (comments)     Not effective    Prozac [Fluoxetine] Anxiety     Irritable, restless        Current Outpatient Medications   Medication Sig Dispense Refill    ALPRAZolam (XANAX) 1 mg tablet Take 1 Tab by mouth two (2) times daily as needed for Anxiety. Max Daily Amount: 2 mg. Indications: Anxiousness associated with Depression 60 Tab 2    buPROPion XL (WELLBUTRIN XL) 300 mg XL tablet Take 1 Tab by mouth daily (after breakfast). Indications: Anxiousness associated with Depression 90 Tab 0    traZODone (DESYREL) 100 mg tablet TAKE ONE TABLET BY MOUTH ONCE NIGHTLY 90 Tab 0    ibuprofen (MOTRIN) 800 mg tablet Take 1 Tab by mouth every eight (8) hours as needed for Pain. 60 Tab 3    varicella-zoster recombinant, PF, (SHINGRIX, PF,) 50 mcg/0.5 mL susr injection Pharmacy to administer 0.5 mL. 0.5 mL 1    ipratropium (ATROVENT) 42 mcg (0.06 %) nasal spray 2 Sprays by Both Nostrils route four (4) times daily. 15 mL 0    icosapent ethyl (VASCEPA) 1 gram capsule Take 2 Caps by mouth two (2) times a day. 360 Cap 3    amLODIPine-atorvastatin (CADUET) 10-80 mg per tablet Take 1 Tab by mouth daily. 90 Tab 3    aspirin delayed-release 81 mg tablet Take 1 Tab by mouth daily. 30 Tab 11          Problems addressed today: Moderate recurrent depression, anxiety nos, insomnia unspecified, nicotine dependence,       Assessment:   Tin Baker  is a 77 y.o. AA  female  is responding to treatment. Symptoms are infrequent.  Reports compliance. No gross psychosis or arlene. No side effects reported. Reviewed labs. Patient denies SI/HI/SIB. No evidence of AH/VH or delusions. Client is responding to treatment and is tolerating treatment well. Psychoeducation, medication teaching, co-morbid illness and pertinent health factors to manage care were discussed. Overall, patient is  stable at this time and will require ongoing medication management. Possible organic causes contributing are: HT  Reviewed medical admissions and discussed with the patient. Client is medically stable. Vitals stable  Risk Scoring- chronic illnesses and prescription drug management    Treatment Plan:  1. Medications:          Medication Changes/Adjustments: Continue Wellbutrin 300 mg daily with BF                                                                Continue  Trazodone 100 mg hs prn                                                                 Continue  Xanax 1 mg BID prn                                                                  Ordered UDS     Current Outpatient Medications   Medication Sig Dispense Refill    ALPRAZolam (XANAX) 1 mg tablet Take 1 Tab by mouth two (2) times daily as needed for Anxiety. Max Daily Amount: 2 mg. Indications: Anxiousness associated with Depression 60 Tab 2    buPROPion XL (WELLBUTRIN XL) 300 mg XL tablet Take 1 Tab by mouth daily (after breakfast). Indications: Anxiousness associated with Depression 90 Tab 0    traZODone (DESYREL) 100 mg tablet TAKE ONE TABLET BY MOUTH ONCE NIGHTLY 90 Tab 0    ibuprofen (MOTRIN) 800 mg tablet Take 1 Tab by mouth every eight (8) hours as needed for Pain. 60 Tab 3    varicella-zoster recombinant, PF, (SHINGRIX, PF,) 50 mcg/0.5 mL susr injection Pharmacy to administer 0.5 mL. 0.5 mL 1    ipratropium (ATROVENT) 42 mcg (0.06 %) nasal spray 2 Sprays by Both Nostrils route four (4) times daily. 15 mL 0    icosapent ethyl (VASCEPA) 1 gram capsule Take 2 Caps by mouth two (2) times a day. 360 Cap 3    amLODIPine-atorvastatin (CADUET) 10-80 mg per tablet Take 1 Tab by mouth daily. 90 Tab 3    aspirin delayed-release 81 mg tablet Take 1 Tab by mouth daily. 27 Tab 11                  The following regarding medications was addressed:    (The risks and benefits of the proposed medication; the potential medication side effects ie    dry mouth, weight gain, GI upset, headache; patient given opportunity to ask questions)       2. Counseling and coordination of care including instructions for treatment, risks/benefits, risk factor reduction and patient/family education. She agrees with the plan. Patient instructed to call with any side effects, questions or issues. 3.  Patient was provided supportive counseling for her caregiver stress. She was encouraged to find ways to get out of the house and be active. .     4.  Patient was educated on the complication of obesity and was strongly recommended to walk daily and have portion control of her meals. She was again educated on smoking cessation. PSYCHOTHERAPY:  approx 20 minutes  Type:  Supportive/Solution Focused psychotherapy provided  Focus:     Current problems:   Caregiver stress   Medical issues   Interpersonal conflicts    Psychoeducation provided:  Psych medications. Treatment plan reviewed with patient-including diagnosis and medications    Radha Granado is stable. Follow-up and Dispositions    · Return in about 3 months (around 12/30/2019) for med check and follow up.            Kenyatta Melvin NP  9/30/2019

## 2019-10-11 LAB — DRUGS UR: NORMAL

## 2019-10-15 ENCOUNTER — HOSPITAL ENCOUNTER (OUTPATIENT)
Age: 69
Setting detail: OUTPATIENT SURGERY
Discharge: HOME | End: 2019-10-15
Attending: SPECIALIST | Admitting: SPECIALIST
Payer: MEDICARE

## 2019-10-15 ENCOUNTER — ANESTHESIA (OUTPATIENT)
Dept: ENDOSCOPY | Age: 69
End: 2019-10-15
Payer: MEDICARE

## 2019-10-15 ENCOUNTER — ANESTHESIA EVENT (OUTPATIENT)
Dept: ENDOSCOPY | Age: 69
End: 2019-10-15
Payer: MEDICARE

## 2019-10-15 VITALS
TEMPERATURE: 97.8 F | WEIGHT: 195.25 LBS | DIASTOLIC BLOOD PRESSURE: 74 MMHG | HEART RATE: 73 BPM | OXYGEN SATURATION: 100 % | BODY MASS INDEX: 35.93 KG/M2 | SYSTOLIC BLOOD PRESSURE: 155 MMHG | RESPIRATION RATE: 17 BRPM | HEIGHT: 62 IN

## 2019-10-15 PROCEDURE — 76060000032 HC ANESTHESIA 0.5 TO 1 HR: Performed by: SPECIALIST

## 2019-10-15 PROCEDURE — 74011000250 HC RX REV CODE- 250: Performed by: NURSE ANESTHETIST, CERTIFIED REGISTERED

## 2019-10-15 PROCEDURE — 88305 TISSUE EXAM BY PATHOLOGIST: CPT

## 2019-10-15 PROCEDURE — 77030013992 HC SNR POLYP ENDOSC BSC -B: Performed by: SPECIALIST

## 2019-10-15 PROCEDURE — 74011250636 HC RX REV CODE- 250/636: Performed by: NURSE ANESTHETIST, CERTIFIED REGISTERED

## 2019-10-15 PROCEDURE — 76040000007: Performed by: SPECIALIST

## 2019-10-15 PROCEDURE — 74011250636 HC RX REV CODE- 250/636: Performed by: SPECIALIST

## 2019-10-15 RX ORDER — LIDOCAINE HYDROCHLORIDE 20 MG/ML
INJECTION, SOLUTION EPIDURAL; INFILTRATION; INTRACAUDAL; PERINEURAL AS NEEDED
Status: DISCONTINUED | OUTPATIENT
Start: 2019-10-15 | End: 2019-10-15 | Stop reason: HOSPADM

## 2019-10-15 RX ORDER — PROPOFOL 10 MG/ML
INJECTION, EMULSION INTRAVENOUS AS NEEDED
Status: DISCONTINUED | OUTPATIENT
Start: 2019-10-15 | End: 2019-10-15 | Stop reason: HOSPADM

## 2019-10-15 RX ORDER — SODIUM CHLORIDE 9 MG/ML
50 INJECTION, SOLUTION INTRAVENOUS CONTINUOUS
Status: DISCONTINUED | OUTPATIENT
Start: 2019-10-15 | End: 2019-10-15 | Stop reason: HOSPADM

## 2019-10-15 RX ORDER — SODIUM CHLORIDE 0.9 % (FLUSH) 0.9 %
5-40 SYRINGE (ML) INJECTION AS NEEDED
Status: DISCONTINUED | OUTPATIENT
Start: 2019-10-15 | End: 2019-10-15 | Stop reason: HOSPADM

## 2019-10-15 RX ORDER — SODIUM CHLORIDE 0.9 % (FLUSH) 0.9 %
5-40 SYRINGE (ML) INJECTION EVERY 8 HOURS
Status: DISCONTINUED | OUTPATIENT
Start: 2019-10-15 | End: 2019-10-15 | Stop reason: HOSPADM

## 2019-10-15 RX ORDER — DEXTROMETHORPHAN/PSEUDOEPHED 2.5-7.5/.8
1.2 DROPS ORAL
Status: DISCONTINUED | OUTPATIENT
Start: 2019-10-15 | End: 2019-10-15 | Stop reason: HOSPADM

## 2019-10-15 RX ADMIN — SODIUM CHLORIDE 50 ML/HR: 900 INJECTION, SOLUTION INTRAVENOUS at 12:31

## 2019-10-15 RX ADMIN — LIDOCAINE HYDROCHLORIDE 40 MG: 20 INJECTION, SOLUTION EPIDURAL; INFILTRATION; INTRACAUDAL; PERINEURAL at 12:39

## 2019-10-15 RX ADMIN — PROPOFOL 100 MG: 10 INJECTION, EMULSION INTRAVENOUS at 12:47

## 2019-10-15 RX ADMIN — PROPOFOL 100 MG: 10 INJECTION, EMULSION INTRAVENOUS at 12:40

## 2019-10-15 RX ADMIN — PROPOFOL 100 MG: 10 INJECTION, EMULSION INTRAVENOUS at 12:55

## 2019-10-15 NOTE — PROCEDURES
Colonoscopy Procedure Note    Indications:   Screening colonoscopy    Referring Physician: Gege Sanchez NP  Anesthesia/Sedation: MAC anesthesia Propofol  Endoscopist:  Dr. Severiano Clos    Procedure in Detail:  Informed consent was obtained for the procedure, including sedation. Risks of perforation, hemorrhage, adverse drug reaction, and aspiration were discussed. The patient was placed in the left lateral decubitus position. Based on the pre-procedure assessment, including review of the patient's medical history, medications, allergies, and review of systems, she had been deemed to be an appropriate candidate for moderate sedation; she was therefore sedated with the medications listed above. The patient was monitored continuously with ECG tracing, pulse oximetry, blood pressure monitoring, and direct observations. A rectal examination was performed. The [id number] was inserted into the rectum and advanced under direct vision to the cecum, which was identified by the ileocecal valve and appendiceal orifice. The quality of the colonic preparation was adequate. A careful inspection was made as the colonoscope was withdrawn, including a retroflexed view of the rectum; findings and interventions are described below. Appropriate photodocumentation was obtained. Findings:     1. Scope advanced to the cecum. 2.  Preparation was adequate. 3.  (2) sessile polyps 5 mm s/p cold snare removal in the sigmoid colon. 4.  Small internal hemorrhoids. Therapies:  See above    Specimen:  Specimens were collected as described above and sent to pathology. Complications: None were encountered during the procedure. EBL: < 10 ml.     Recommendations:   -repeat colonoscopy in 5 years    Signed By: Jennifer Garza MD                        October 15, 2019

## 2019-10-15 NOTE — ANESTHESIA POSTPROCEDURE EVALUATION
Procedure(s):  COLONOSCOPY  ENDOSCOPIC POLYPECTOMY. MAC, total IV anesthesia    Anesthesia Post Evaluation        Patient location during evaluation: PACU  Note status: Adequate. Level of consciousness: responsive to verbal stimuli and sleepy but conscious  Pain management: satisfactory to patient  Airway patency: patent  Anesthetic complications: no  Cardiovascular status: acceptable  Respiratory status: acceptable  Hydration status: acceptable  Comments: +Post-Anesthesia Evaluation and Assessment    Patient: Olivier Laura MRN: [id number]  SSN: [id number]   YOB: 1950  Age: 77 y.o. Sex: female      Cardiovascular Function/Vital Signs    /69   Pulse 78   Resp 17   Ht 5' 2\" (1.575 m)   Wt 88.6 kg (195 lb 4 oz)   SpO2 100%   Breastfeeding? No   BMI 35.71 kg/m²     Patient is status post Procedure(s):  COLONOSCOPY  ENDOSCOPIC POLYPECTOMY. Nausea/Vomiting: Controlled. Postoperative hydration reviewed and adequate. Pain:  Pain Scale 1: Numeric (0 - 10) (10/15/19 1308)  Pain Intensity 1: 0 (10/15/19 1308)   Managed. Neurological Status: At baseline. Mental Status and Level of Consciousness: Arousable. Pulmonary Status:   O2 Device: Room air (10/15/19 1308)   Adequate oxygenation and airway patent. Complications related to anesthesia: None    Post-anesthesia assessment completed. No concerns.     Signed By: Blake Luna MD    10/15/2019  Post anesthesia nausea and vomiting:  controlled      Vitals Value Taken Time   /69 10/15/2019  1:08 PM   Temp     Pulse 78 10/15/2019  1:08 PM   Resp 17 10/15/2019  1:08 PM   SpO2 100 % 10/15/2019  1:08 PM

## 2019-10-15 NOTE — H&P
Gastroenterology Outpatient History and Physical    Patient: Uzair Sow    Physician: Paul Reyes MD    Vital Signs: Blood pressure 142/78, pulse 91, resp. rate 22, height 5' 2\" (1.575 m), weight 88.6 kg (195 lb 4 oz), SpO2 98 %, not currently breastfeeding. Allergies: Allergies   Allergen Reactions    Citalopram Other (comments)     Not effective    Prozac [Fluoxetine] Anxiety     Irritable, restless       Chief Complaint: Screening    History of Present Illness: 78 yo BF for colonoscopy for Cologuard Positive. Justification for Procedure: above    History:  Past Medical History:   Diagnosis Date    Anxiety     Arthritis     knees    High cholesterol     Hypertension     Psychiatric disorder     anxiety    History reviewed. No pertinent surgical history. Social History     Socioeconomic History    Marital status:      Spouse name: Not on file    Number of children: Not on file    Years of education: Not on file    Highest education level: Not on file   Tobacco Use    Smoking status: Former Smoker     Packs/day: 0.25     Types: Cigarettes     Last attempt to quit: 2019     Years since quittin.2    Smokeless tobacco: Never Used    Tobacco comment: pt states that she Quit 4 weeks ago    Substance and Sexual Activity    Alcohol use: No    Drug use: Never    Sexual activity: Not Currently      Family History   Problem Relation Age of Onset   [de-identified] Cancer Mother         throat    Cancer Father         lung    Hypertension Maternal Grandmother     Hypertension Maternal Grandfather     Hypertension Paternal Grandmother     Hypertension Paternal Grandfather        Medications:   Prior to Admission medications    Medication Sig Start Date End Date Taking? Authorizing Provider   ALPRAZolam Ghulam Colton) 1 mg tablet Take 1 Tab by mouth two (2) times daily as needed for Anxiety. Max Daily Amount: 2 mg.  Indications: Anxiousness associated with Depression 19  Yes [address] [place] [address], Christine, NP   buPROPion XL (WELLBUTRIN XL) 300 mg XL tablet Take 1 Tab by mouth daily (after breakfast). Indications: Anxiousness associated with Depression 9/30/19  Yes Christine Lundberg NP   traZODone (DESYREL) 100 mg tablet TAKE ONE TABLET BY MOUTH ONCE NIGHTLY 9/30/19  Yes Christine Lundberg NP   ibuprofen (MOTRIN) 800 mg tablet Take 1 Tab by mouth every eight (8) hours as needed for Pain. 7/30/19  Yes Duc Rodriguez NP   ipratropium (ATROVENT) 42 mcg (0.06 %) nasal spray 2 Sprays by Both Nostrils route four (4) times daily. 1/30/19  Yes Duc Rodriguez NP   icosapent ethyl (VASCEPA) 1 gram capsule Take 2 Caps by mouth two (2) times a day. 1/30/19  Yes Duc Rodriguez NP   amLODIPine-atorvastatin (CADUET) 10-80 mg per tablet Take 1 Tab by mouth daily. 1/30/19  Yes Duc Rodriguez NP   aspirin delayed-release 81 mg tablet Take 1 Tab by mouth daily. 4/27/15  Yes Tyesha Naranjo MD   varicella-zoster recombinant, PF, (SHINGRIX, PF,) 50 mcg/0.5 mL susr injection Pharmacy to administer 0.5 mL. 7/30/19   Devan Hunt NP       Physical Exam:   General: alert, no distress   HEENT: Head: Normocephalic, no lesions, without obvious abnormality.    Heart: regular rate and rhythm, S1, S2 normal, no murmur, click, rub or gallop   Lungs: chest clear, no wheezing, rales, normal symmetric air entry   Abdominal: soft, NT/ND + BS   Neurological: Grossly normal   Extremities: extremities normal, atraumatic, no cyanosis or edema     Findings/Diagnosis: Cologuard +    Plan of Care/Planned Procedure: Colonoscopy

## 2019-10-15 NOTE — DISCHARGE INSTRUCTIONS
Blake Gonzalez  [id number]  1950    COLON DISCHARGE INSTRUCTIONS  Discomfort:  Redness at IV site- apply warm compress to area; if redness or soreness persist- contact your physician  There may be a slight amount of blood passed from the rectum  Gaseous discomfort- walking, belching will help relieve any discomfort  You may not operate a vehicle for 12 hours  You may not engage in an occupation involving machinery or appliances for rest of today  You may not drink alcoholic beverages for at least 12 hours  Avoid making any critical decisions for at least 24 hour  DIET:   Regular diet. - however -  remember your colon is empty and a heavy meal will produce gas. Avoid these foods:  vegetables, fried / greasy foods, carbonated drinks for today. MEDICATIONS:        Regarding Aspirin or Nonsteroidal medications, please see below. ACTIVITY:  You may resume your normal daily activities it is recommended that you spend the remainder of the day resting -  avoid any strenuous activity. CALL M.D. ANY SIGN OF:  Increasing pain, nausea, vomiting  Abdominal distension (swelling)  New increased bleeding (oral or rectal)  Fever (chills)  Pain in chest area  Bloody discharge from nose or mouth  Shortness of breath   Tylenol as needed for pain.       Follow-up Instructions:   Call Dr. Mira Chaudhary for results of procedure / biopsy in 4-5 days at telephone #  [phone]              Repeat Colonoscopy in 5 years

## 2019-10-15 NOTE — PERIOP NOTES
Polyps removed from Sigmoid:   - Protruding lesions:     -Sessile Polyp(s) size 2-3 mm removed by polypectomy (cold snare) using cold snare technique.

## 2019-10-15 NOTE — ANESTHESIA PREPROCEDURE EVALUATION
Relevant Problems   No relevant active problems       Anesthetic History   No history of anesthetic complications            Review of Systems / Medical History  Patient summary reviewed, nursing notes reviewed and pertinent labs reviewed    Pulmonary  Within defined limits                 Neuro/Psych         Psychiatric history     Cardiovascular    Hypertension              Exercise tolerance: >4 METS     GI/Hepatic/Renal  Within defined limits              Endo/Other        Obesity and arthritis     Other Findings   Comments: Positive Cologuard            Physical Exam    Airway             Cardiovascular               Dental    Dentition: Full upper dentures and Lower partial plate     Pulmonary                 Abdominal         Other Findings            Anesthetic Plan    ASA: 2  Anesthesia type: MAC and total IV anesthesia          Induction: Intravenous  Anesthetic plan and risks discussed with: Patient

## 2019-10-15 NOTE — ROUTINE PROCESS
Abdi Butcher 1950 
[id number] Situation: 
Verbal report received from: Se Radu Procedure: Procedure(s): 
COLONOSCOPY 
ENDOSCOPIC POLYPECTOMY Background: 
 
Preoperative diagnosis: POSITIVE COLOGUARD, CONSTIPATION Postoperative diagnosis: colon polyps; hemorrhoids :  Dr. Nuno Esquivel Assistant(s): Endoscopy RN-1: Ed Fernandes Endoscopy RN-2: Cy Curtis RN Specimens:  
ID Type Source Tests Collected by Time Destination 1 : sigmoid colon polyps for pathology Preservative Sigmoid  Aditi George MD 10/15/2019 1258 Pathology H. Pylori  no Assessment: 
Intra-procedure medications Anesthesia gave intra-procedure sedation and medications, see anesthesia flow sheet yes Intravenous fluids: NS@ Magdaline Clunes Vital signs stable Abdominal assessment: round and soft Recommendation: 
Discharge patient per MD order. Family or grand [place]anson Permission to share finding with family or friend yes

## 2019-10-21 PROBLEM — Z98.890 S/P COLONOSCOPIC POLYPECTOMY: Status: ACTIVE | Noted: 2019-10-21

## 2019-12-12 ENCOUNTER — OFFICE VISIT (OUTPATIENT)
Dept: BEHAVIORAL/MENTAL HEALTH CLINIC | Age: 69
End: 2019-12-12

## 2019-12-12 VITALS
OXYGEN SATURATION: 97 % | BODY MASS INDEX: 34.96 KG/M2 | SYSTOLIC BLOOD PRESSURE: 144 MMHG | HEIGHT: 62 IN | WEIGHT: 190 LBS | HEART RATE: 100 BPM | DIASTOLIC BLOOD PRESSURE: 85 MMHG

## 2019-12-12 DIAGNOSIS — F41.9 ANXIETY AND DEPRESSION: ICD-10-CM

## 2019-12-12 DIAGNOSIS — F32.A ANXIETY AND DEPRESSION: ICD-10-CM

## 2019-12-12 RX ORDER — ALPRAZOLAM 1 MG/1
TABLET ORAL
Qty: 45 TAB | Refills: 2 | Status: SHIPPED | OUTPATIENT
Start: 2019-12-12 | End: 2020-08-31 | Stop reason: SDUPTHER

## 2019-12-12 RX ORDER — BUPROPION HYDROCHLORIDE 300 MG/1
300 TABLET ORAL
Qty: 90 TAB | Refills: 0 | Status: SHIPPED | OUTPATIENT
Start: 2019-12-12 | End: 2021-02-25 | Stop reason: ALTCHOICE

## 2019-12-12 RX ORDER — TRAZODONE HYDROCHLORIDE 100 MG/1
TABLET ORAL
Qty: 90 TAB | Refills: 0 | Status: SHIPPED | OUTPATIENT
Start: 2019-12-12 | End: 2021-10-12 | Stop reason: ALTCHOICE

## 2019-12-12 NOTE — PROGRESS NOTES
Psychiatric Outpatient Progress Note    Account Number:  [id number]  Name: Susy Rodriguez    SUBJECTIVE:   CHIEF COMPLAINT:  Santiago Rod is a 69 y.o. , AA female and was seen today for  follow-up of psychiatric condition and psychotropic medication management. Received treatment from Dr Paxton Farrell MD since April 2017. Daniel Swan Patient was transferred as her previous provider Dr Paxton Farrell MD,  has  left the practice. Last office visit was 09/ 2019.        HPI:  Coretta reports the following psychiatric symptoms:  depression, anxiety and caregiver stress.  The symptoms have been present for years and are of moderate severity. The symptoms occur infrequently. Reported has medical changes- has scheduled colonoscopy r/t fili. h/o constipation. She is worried about it. She has stopped smoking. And using nicorette gum and patch. reported her appetite is fair, has interest, has motivation and able to focus and concenrtrate. Reported sh e feels depressed at times . Has interest and motivation. Feels sad and ruminates about past.  Reported she is taking alprazolam and taking half tablet at times. She was asking alprazolam form pharmacy earlier and was frustrated as it was not filled early. She received on time. Her appetite is fair, denied any hopelesness or helplessness or passive suicide thoughts. Reported she is sleeping fitful. She is caring for her  who needs kidney transplant. her niece moved out. She is caring for grand children now. he calls on phone numerous times. She has been caring for 3 years. She is looking for nursing home. She feels relaxed with grand kids and enjoys it. Overall feeling much better with stable sleep and appetite. Denies any psychosis or arlene. Reports compliance with medications.      Contributing factors include: caregiver burden.       Patient denies SI/HI/SIB.        Side Effects:  none        Past Psychiatric History:      · Previous psychiatric care: admits  · As per HPI     · Previous suicide attempts: none     · Previous Hospitalizations: denies  · none     · Previous psychiatric medications/ECT/TMS: admits  · As per HPI. No ECT or TMS          History of rehab, detox, withdrawal: none     Social History:   Social History            Social History    Marital status:        Spouse name: N/A    Number of children: N/A    Years of education: N/A             Social History Main Topics    Smoking status: Current Every Day Smoker       Packs/day: 0.50    Smokeless tobacco: None         Comment: down to 1/2 ppd from 1 ppd    Alcohol use No    Drug use: No    Sexual activity: Not Asked           Other Topics Concern    None      Social History Narrative         Ethnic:   Relationship Status:  x 51 years. Has 2 adult children  Living Situation: With spouse   Employment: retired  Tobacco/Caffeine/Alchol use: smokes 1/2 ppd  Illicit Drug Social History:  Remote h/o THC use  Hobbies:  TV  Sexual:  not sexually active     Family History:         Family History   Problem Relation Age of Onset    No Known Problems Mother      No Known Problems Father           Past Medical History:        Past Medical History:   Diagnosis Date    Anxiety      Arthritis      Hypertension      Psychiatric disorder          Fam/Soc Hx (from [facility] Eval with updates):    Ethnic:   Relationship Status:  x 51 years.  Has 2 adult children  Living Situation: With spouse   Employment: retired  Tobacco/Caffeine/Alchol use: smokes 1/2 ppd  Illicit Drug Social History:  Remote h/o THC use  Hobbies:  TV  Sexual:  not sexually active      REVIEW OF SYSTEMS:  Constitutional: positive for energy  Eyes: positive for contacts/glasses  Ears, nose, mouth, throat, and face: negative for hearing loss and tinnitus  Respiratory: positive for cough  Neurological: negative for headaches and seizures  Behavioral/Psych: positive for anxiety, negative for SI or HI  Appetite: fair  Sleep: poor    SCALES (4/10/17):  MOCA: 28/30  GDS:5/15     Visit Vitals  /85 (BP 1 Location: Left arm, BP Patient Position: Sitting)   Pulse 100   Ht 5' 2\" (1.575 m)   Wt 86.2 kg (190 lb)   SpO2 97%   BMI 34.75 kg/m²       OBJECTIVE:                 Mental Status exam: WNL except for      Sensorium  oriented to time, place and person   Relations cooperative and passive    Eye Contact    appropriate   Appearance:  age appropriate, casually dressed and overweight   Motor Behavior/Gait:  within normal limits   Speech:  normal pitch and normal volume   Thought Process: goal directed and logical   Thought Content free of delusions and free of hallucinations   Suicidal ideations none   Homicidal ideations none   Mood:  anxious   Affect:  Anxious and mood congruent   Memory recent  adequate   Memory remote:  adequate   Concentration:  adequate   Abstraction:  concrete   Insight:  fair   Reliability fair   Judgment:  fair       MEDICAL DECISION MAKING  Data: pertinent labs, imaging, medical records and diagnostic tests reviewed and incorporated in diagnosis and treatment plan    Allergies   Allergen Reactions    Citalopram Other (comments)     Not effective    Prozac [Fluoxetine] Anxiety     Irritable, restless        Current Outpatient Medications   Medication Sig Dispense Refill    ALPRAZolam (XANAX) 1 mg tablet Take 1 Tab by mouth two (2) times daily as needed for Anxiety. Max Daily Amount: 2 mg. Indications: Anxiousness associated with Depression 60 Tab 2    buPROPion XL (WELLBUTRIN XL) 300 mg XL tablet Take 1 Tab by mouth daily (after breakfast). Indications: Anxiousness associated with Depression 90 Tab 0    traZODone (DESYREL) 100 mg tablet TAKE ONE TABLET BY MOUTH ONCE NIGHTLY 90 Tab 0    ibuprofen (MOTRIN) 800 mg tablet Take 1 Tab by mouth every eight (8) hours as needed for Pain.  60 Tab 3    ipratropium (ATROVENT) 42 mcg (0.06 %) nasal spray 2 Sprays by Both Nostrils route four (4) times daily. 15 mL 0    icosapent ethyl (VASCEPA) 1 gram capsule Take 2 Caps by mouth two (2) times a day. 360 Cap 3    amLODIPine-atorvastatin (CADUET) 10-80 mg per tablet Take 1 Tab by mouth daily. 90 Tab 3    aspirin delayed-release 81 mg tablet Take 1 Tab by mouth daily. 30 Tab 11    varicella-zoster recombinant, PF, (SHINGRIX, PF,) 50 mcg/0.5 mL susr injection Pharmacy to administer 0.5 mL. 0.5 mL 1          Problems addressed today: Moderate recurrent depression, anxiety nos, insomnia unspecified, nicotine dependence,       Assessment:  reviewed: filled alprazolam on 12/04/19-   Allison Dodson  is a 77 y.o. AA  female  is responding to treatment. Symptoms are infrequent. Reports compliance. reported her mood is stable. She agreed to decrease the dose of alprazolam and has tolerated the decreased dose. Plan to continue bupropion and trazodone to target depression and insomnia  Respectively. No gross psychosis or arlene. No side effects reported. Reviewed labs. Patient denies SI/HI/SIB. No evidence of AH/VH or delusions. Client is responding to treatment and is tolerating treatment well. Psychoeducation, medication teaching, co-morbid illness and pertinent health factors to manage care were discussed. Overall, patient is  stable at this time and will require ongoing medication management. Possible organic causes contributing are: HT  Reviewed medical admissions and discussed with the patient. Client is medically stable. Vitals stable  Risk Scoring- chronic illnesses and prescription drug management    Treatment Plan:  1.   Medications:          Medication Changes/Adjustments: Continue Wellbutrin 300 mg daily with BF                                                                Continue  Trazodone 100 mg hs prn                                                                 Decrease   Xanax 0.5 mg in morning and 1 mg evening prn Current Outpatient Medications   Medication Sig Dispense Refill    ALPRAZolam (XANAX) 1 mg tablet Take 1 Tab by mouth two (2) times daily as needed for Anxiety. Max Daily Amount: 2 mg. Indications: Anxiousness associated with Depression 60 Tab 2    buPROPion XL (WELLBUTRIN XL) 300 mg XL tablet Take 1 Tab by mouth daily (after breakfast). Indications: Anxiousness associated with Depression 90 Tab 0    traZODone (DESYREL) 100 mg tablet TAKE ONE TABLET BY MOUTH ONCE NIGHTLY 90 Tab 0    ibuprofen (MOTRIN) 800 mg tablet Take 1 Tab by mouth every eight (8) hours as needed for Pain. 60 Tab 3    ipratropium (ATROVENT) 42 mcg (0.06 %) nasal spray 2 Sprays by Both Nostrils route four (4) times daily. 15 mL 0    icosapent ethyl (VASCEPA) 1 gram capsule Take 2 Caps by mouth two (2) times a day. 360 Cap 3    amLODIPine-atorvastatin (CADUET) 10-80 mg per tablet Take 1 Tab by mouth daily. 90 Tab 3    aspirin delayed-release 81 mg tablet Take 1 Tab by mouth daily. 30 Tab 11    varicella-zoster recombinant, PF, (SHINGRIX, PF,) 50 mcg/0.5 mL susr injection Pharmacy to administer 0.5 mL. 0.5 mL 1                  The following regarding medications was addressed:    (The risks and benefits of the proposed medication; the potential medication side effects ie    dry mouth, weight gain, GI upset, headache; patient given opportunity to ask questions)       2. Counseling and coordination of care including instructions for treatment, risks/benefits, risk factor reduction and patient/family education. She agrees with the plan. Patient instructed to call with any side effects, questions or issues. 3.  Patient was provided supportive counseling for her caregiver stress. She was encouraged to find ways to get out of the house and be active. .     4.  Patient was educated on the complication of obesity and was strongly recommended to walk daily and have portion control of her meals. She was again educated on smoking cessation. 5. Pt was told about my departure from this practice. Client would like to follow up in other practice. PSYCHOTHERAPY:  approx 20 minutes  Type:  Supportive/Solution Focused psychotherapy provided  Focus:     Current problems:   Caregiver stress   Medical issues   Interpersonal conflicts-      Psychoeducation provided:  Psych medications. Treatment plan reviewed with patient-including diagnosis and medications    Ailyn Daniel is stable.           Theresa Zapata NP  12/12/2019

## 2019-12-12 NOTE — PROGRESS NOTES
Cris Gonzalez is a 77 y.o. female    Chief Complaint   Patient presents with    Follow-up    Mental Health Problem     Blood pressure 144/85, pulse 100, height 5' 2\" (1.575 m), weight 190 lb (86.2 kg), SpO2 97 %. 1. Have you been to the ER, urgent care clinic since your last visit? Hospitalized since your last visit? No      2. Have you seen or consulted any other health care providers outside of the [address] [place] [address] since your last visit? Include any pap smears or colon screening.   No

## 2020-01-30 ENCOUNTER — OFFICE VISIT (OUTPATIENT)
Dept: INTERNAL MEDICINE CLINIC | Age: 70
End: 2020-01-30

## 2020-01-30 VITALS
RESPIRATION RATE: 17 BRPM | WEIGHT: 193 LBS | BODY MASS INDEX: 35.51 KG/M2 | DIASTOLIC BLOOD PRESSURE: 70 MMHG | SYSTOLIC BLOOD PRESSURE: 140 MMHG | HEART RATE: 81 BPM | OXYGEN SATURATION: 96 % | HEIGHT: 62 IN | TEMPERATURE: 96.6 F

## 2020-01-30 DIAGNOSIS — Z13.21 SCREENING FOR ENDOCRINE, NUTRITIONAL, METABOLIC AND IMMUNITY DISORDER: ICD-10-CM

## 2020-01-30 DIAGNOSIS — Z13.29 SCREENING FOR ENDOCRINE, NUTRITIONAL, METABOLIC AND IMMUNITY DISORDER: ICD-10-CM

## 2020-01-30 DIAGNOSIS — M25.562 CHRONIC PAIN OF BOTH KNEES: ICD-10-CM

## 2020-01-30 DIAGNOSIS — G89.29 CHRONIC PAIN OF BOTH KNEES: ICD-10-CM

## 2020-01-30 DIAGNOSIS — M25.561 CHRONIC PAIN OF BOTH KNEES: ICD-10-CM

## 2020-01-30 DIAGNOSIS — M17.11 PRIMARY OSTEOARTHRITIS OF RIGHT KNEE: ICD-10-CM

## 2020-01-30 DIAGNOSIS — Z13.228 SCREENING FOR ENDOCRINE, NUTRITIONAL, METABOLIC AND IMMUNITY DISORDER: ICD-10-CM

## 2020-01-30 DIAGNOSIS — I10 ESSENTIAL HYPERTENSION WITH GOAL BLOOD PRESSURE LESS THAN 140/90: Primary | ICD-10-CM

## 2020-01-30 DIAGNOSIS — E78.2 MIXED HYPERLIPIDEMIA: ICD-10-CM

## 2020-01-30 DIAGNOSIS — Z13.0 SCREENING FOR ENDOCRINE, NUTRITIONAL, METABOLIC AND IMMUNITY DISORDER: ICD-10-CM

## 2020-01-30 RX ORDER — IBUPROFEN 800 MG/1
800 TABLET ORAL
Qty: 60 TAB | Refills: 3 | Status: SHIPPED | OUTPATIENT
Start: 2020-01-30 | End: 2021-02-25 | Stop reason: ALTCHOICE

## 2020-01-30 RX ORDER — ICOSAPENT ETHYL 1000 MG/1
2 CAPSULE ORAL 2 TIMES DAILY
Qty: 360 CAP | Refills: 3 | Status: SHIPPED | OUTPATIENT
Start: 2020-01-30 | End: 2021-02-25 | Stop reason: SDUPTHER

## 2020-01-30 RX ORDER — LINACLOTIDE 145 UG/1
CAPSULE, GELATIN COATED ORAL
COMMUNITY
Start: 2020-01-11 | End: 2021-02-25 | Stop reason: ALTCHOICE

## 2020-01-30 RX ORDER — AMLODIPINE BESYLATE AND ATORVASTATIN CALCIUM 10; 80 MG/1; MG/1
1 TABLET, FILM COATED ORAL DAILY
Qty: 90 TAB | Refills: 3 | Status: SHIPPED | OUTPATIENT
Start: 2020-01-30 | End: 2021-01-26

## 2020-01-30 NOTE — PROGRESS NOTES
Earnstine Burkitt is a 72 y.o. female and presents with Follow-up (CHOL, and HTN, with labs)    Subjective:  Dyslipidemia Review:  Patient presents for evaluation of lipids. Compliance with treatment thus far has been good. Denies myalgias, slurring speech, unilateral weakness, and chest pain. A repeat fasting lipid profile was done/ordered. The patient does NOT use medications that may worsen dyslipidemias (corticosteroids, progestins, anabolic steroids, diuretics, beta-blockers, amiodarone, cyclosporine, olanzapine). The patient does try to exercise regularly. The patient is NOT known to have coexisting coronary artery disease. Taking Aspirin daily as prescribed/advised    Knee pain Review:  Patient complains of BILATERAL knee pain. Onset of the symptoms was years  ago. Inciting event: longstanding problem. Current symptoms include knee pain and swelling. Limiting activity. Aggravating symptoms: going up and down stairs, walking, lateral movements, touching, any weight bearing. Patient's overall course: stable. Patient has had prior knee problems. Evaluation to date: X-RAYS & MRI with ORTHO recently. Advised to have scope done, planning on scheduling this next month. Treatment to date:NSAIDS/Tylenol/Aspirin. Aching pain currently, Pain Scale: 0 - No pain/10. Would like ibuprofen refill. Hypertension Review:  The patient has hypertension  Diet and Lifestyle: generally does try to follow a  low sodium diet, eating more veggies and baked foods. Exercises sporadically. Limited due to arthritic knee pain. Home BP Monitoring: is not measured at home. Pertinent ROS: taking medications as instructed, no medication side effects noted. No TIA's, chest pain on exertion, dyspnea on exertion, or swelling of ankles. Down to 2.5 cigs/d, using Nicotine gum now.   BP Readings from Last 3 Encounters:   01/30/20 140/70   12/12/19 144/85   10/15/19 155/74     Review of Systems  Constitutional: negative for fevers, chills, anorexia and weight loss  Respiratory:  negative for hemoptysis, dyspnea, and wheezing  CV:   negative for chest pain, palpitations, and lower extremity edema  GI:   negative for nausea, vomiting, diarrhea, abdominal pain, and melena  Endo:               negative for polyuria,polydipsia,polyphagia, and heat intolerance  Genitourinary: negative for frequency, urgency, dysuria, retention, and hematuria  Integument:  negative for rash, ulcerations, and pruritus  Hematologic:  negative for easy bruising and bleeding  Musculoskel: negative for muscle weakness  Neurological:  negative for headaches, dizziness, vertigo,and memory problems  Behavl/Psych: negative for feelings of suicide, and mood changes    Past Medical History:   Diagnosis Date    Anxiety     Arthritis     knees    High cholesterol     Hypertension     Psychiatric disorder     anxiety     Past Surgical History:   Procedure Laterality Date    COLONOSCOPY N/A 10/15/2019    COL[facility]OSCOPY performed by Rosa [facility]ik [facility] at [facility] ENDOSCOPY     Social History     Socioeconomic History    Marital status:      Spouse name: Not on file    Number of children: Not on file    Years of education: Not on file    Highest education level: Not on file   Tobacco Use    Smoking status: Former Smoker     Packs/day: 0.25     Types: Cigarettes     Last attempt to quit: 2019     Years since quittin.5    Smokeless tobacco: Never Used    Tobacco comment: pt states that she Quit 4 weeks ago    Substance and Sexual Activity    Alcohol use: No    Drug use: Never    Sexual activity: Not Currently     Family History   Problem Relation Age of Onset    Cancer Mother         throat    Cancer Father         lung    Hypertension Maternal Grandmother     Hypertension Maternal Grandfather     Hypertension Paternal Grandmother     Hypertension Paternal Grandfather      Current Outpatient Medications   Medication Sig Dispense Refill    LINZESS 145 mcg cap capsule       ibuprofen (MOTRIN) 800 mg tablet Take 1 Tab by mouth every eight (8) hours as needed for Pain. 60 Tab 3    amLODIPine-atorvastatin (CADUET) 10-80 mg per tablet Take 1 Tab by mouth daily. 90 Tab 3    icosapent ethyL (VASCEPA) 1 gram capsule Take 2 Caps by mouth two (2) times a day. 360 Cap 3    ALPRAZolam (XANAX) 1 mg tablet Take half tablet in morning and take 1 tablet at night  Indications: Anxiousness associated with Depression 45 Tab 2    buPROPion XL (WELLBUTRIN XL) 300 mg XL tablet Take 1 Tab by mouth daily (after breakfast). Indications: Anxiousness associated with Depression 90 Tab 0    traZODone (DESYREL) 100 mg tablet TAKE ONE TABLET BY MOUTH ONCE NIGHTLY 90 Tab 0    ipratropium (ATROVENT) 42 mcg (0.06 %) nasal spray 2 Sprays by Both Nostrils route four (4) times daily. 15 mL 0    aspirin delayed-release 81 mg tablet Take 1 Tab by mouth daily. 30 Tab 11    varicella-zoster recombinant, PF, (SHINGRIX, PF,) 50 mcg/0.5 mL susr injection Pharmacy to administer 0.5 mL. 0.5 mL 1     Allergies   Allergen Reactions    Citalopram Other (comments)     Not effective    Prozac [Fluoxetine] Anxiety     Irritable, restless       Objective:  Visit Vitals  /70 (BP 1 Location: Right arm, BP Patient Position: Sitting)   Pulse 81   Temp 96.6 °F (35.9 °C) (Oral)   Resp 17   Ht 5' 2\" (1.575 m)   Wt 193 lb (87.5 kg)   SpO2 96%   BMI 35.30 kg/m²     Wt Readings from Last 3 Encounters:   01/30/20 193 lb (87.5 kg)   12/12/19 190 lb (86.2 kg)   10/15/19 195 lb 4 oz (88.6 kg)     Physical Exam:   General appearance - alert, well appearing, and in no distress. Mental status - A/O x 4, normal mood and affect. Neck -Supple ,normal CSP. FROM, non-tender. No significant adenopathy/thyromegaly. No JVD. Chest - CTA. Symmetric chest rise. No rhonchi, wheezing, or rales. Heart - Normal rate, regular rhythm. Normal S1, S2. No MGR or clicks. Abdomen - Soft,non-distended. Normoactive BS in all quadrants.  NT, no mass or HSM. Ext- Radial, DP pulses, 2+ bilaterally. No pedal edema, clubbing, or cyanosis. Skin-Warm and dry. No hyperpigmentation, ulcerations, or suspicious lesions. Neuro - Normal speech, no focal findings or movement disorder. Normal strength, gait, and muscle tone. Assessment/Plan:  Labs ordered. See below for other orders   Follow-up and Dispositions    · Return in about 6 months (around 7/30/2020) for HTN, CHOL, knee pain f/u. ICD-10-CM ICD-9-CM    1. Essential hypertension with goal blood pressure less than 140/90 C72 853.2 METABOLIC PANEL, COMPREHENSIVE      CBC WITH AUTOMATED DIFF   2. Primary osteoarthritis of right knee M17.11 715.16 ibuprofen (MOTRIN) 800 mg tablet   3. Chronic pain of both knees M25.561 719.46 ibuprofen (MOTRIN) 800 mg tablet    M25.562 338.29     G89.29     4. Mixed hyperlipidemia E78.2 272.2 amLODIPine-atorvastatin (CADUET) 10-80 mg per tablet      icosapent ethyL (VASCEPA) 1 gram capsule      LIPID PANEL   5. Screening for endocrine, nutritional, metabolic and immunity disorder T85.78 R67.64 METABOLIC PANEL, COMPREHENSIVE    Z13.21  CBC WITH AUTOMATED DIFF    Z13.228  HEMOGLOBIN A1C WITH EAG    Z13.0  TSH 3RD GENERATION     Orders Placed This Encounter    METABOLIC PANEL, COMPREHENSIVE    CBC WITH AUTOMATED DIFF    HEMOGLOBIN A1C WITH EAG    LIPID PANEL    TSH 3RD GENERATION    LINZESS 145 mcg cap capsule    ibuprofen (MOTRIN) 800 mg tablet     Sig: Take 1 Tab by mouth every eight (8) hours as needed for Pain. Dispense:  60 Tab     Refill:  3    amLODIPine-atorvastatin (CADUET) 10-80 mg per tablet     Sig: Take 1 Tab by mouth daily. Dispense:  90 Tab     Refill:  3    icosapent ethyL (VASCEPA) 1 gram capsule     Sig: Take 2 Caps by mouth two (2) times a day. Dispense:  360 Cap     Refill:  3       Zygmunt Dao expressed understanding of plan. An After Visit Summary was offered/printed and given to the patient.

## 2020-01-30 NOTE — PROGRESS NOTES
Pt is here for   Chief Complaint   Patient presents with    Follow-up     CHOL, and HTN, with labs     Pt denies pain at this time    1. Have you been to the ER, urgent care clinic since your last visit? Hospitalized since your last visit? No    2. Have you seen or consulted any other health care providers outside of the [address] [place] [address] since your last visit? Include any pap smears or colon screening.  No

## 2020-01-30 NOTE — PATIENT INSTRUCTIONS
Knee Arthroscopy: Before Your Surgery  What is knee arthroscopy? Arthroscopy is a way to find problems and do surgery inside a joint without making a large cut (incision). Your doctor puts a lighted tube with a tiny camera and surgical tools through small incisions in your knee. The camera is called an arthroscope, or scope. In this surgery, your doctor may:  · Remove or repair a torn piece of cartilage or loose bone. · Replace a torn anterior cruciate ligament (ACL) with a piece of tissue. This repair is called a graft. · Smooth the rough surfaces of your joint. Most people go home on the day of the surgery or the next day. If you have a simple injury, it may take at least 6 weeks to recover. It may take longer if your doctor had to repair damaged tissue. You will need to limit activity while your knee heals. You may need to have physical therapy (rehab) to help your knee get stronger. If you have a desk job, you may be able to go back to work a few days after treatment of a simple injury. If you lift things or stand or walk a lot at work, it may be 2 to 6 weeks before you can go back. After surgery and rehab, you will probably have less pain. Your knee should be stronger. You should be able to use your knee and leg better. Some people have to avoid lifting heavy objects. Follow-up care is a key part of your treatment and safety. Be sure to make and go to all appointments, and call your doctor if you are having problems. It's also a good idea to know your test results and keep a list of the medicines you take. What happens before surgery?   Surgery can be stressful. This information will help you understand what you can expect. And it will help you safely prepare for surgery.   Preparing for surgery    · Understand exactly what surgery is planned, along with the risks, benefits, and other options. · Tell your doctors ALL the medicines, vitamins, supplements, and herbal remedies you take.  Some of these can increase the risk of bleeding or interact with anesthesia.     · If you take blood thinners, such as warfarin (Coumadin), clopidogrel (Plavix), or aspirin, be sure to talk to your doctor. He or she will tell you if you should stop taking these medicines before your surgery. Make sure that you understand exactly what your doctor wants you to do.     · Your doctor will tell you which medicines to take or stop before your surgery. You may need to stop taking certain medicines a week or more before surgery. So talk to your doctor as soon as you can.     · If you have an advance directive, let your doctor know. It may include a living will and a durable power of  for health care. Bring a copy to the hospital. If you don't have one, you may want to prepare one. It lets your doctor and loved ones know your health care wishes. Doctors advise that everyone prepare these papers before any type of surgery or procedure. What happens on the day of surgery? · Follow the instructions exactly about when to stop eating and drinking. If you don't, your surgery may be canceled. If your doctor told you to take your medicines on the day of surgery, take them with only a sip of water.     · Take a bath or shower before you come in for your surgery. Do not apply lotions, perfumes, deodorants, or nail polish.     · Do not shave the surgical site yourself.     · Take off all jewelry and piercings. And take out contact lenses, if you wear them.    At the hospital or surgery center   · Bring a picture ID.     · The area for surgery is often marked to make sure there are no errors.     · You will be kept comfortable and safe by your anesthesia provider. The anesthesia may make you sleep. Or it may just numb the area being worked on.     · The surgery will take about 1 to 2 hours. It depends on how much repair needs to be done to your knee. Going home   · Be sure you have someone to drive you home.  Anesthesia and pain medicine make it unsafe for you to drive.     · You will be given more specific instructions about recovering from your surgery. They will cover things like diet, wound care, follow-up care, driving, and getting back to your normal routine. When should you call your doctor? · You have questions or concerns.     · You don't understand how to prepare for your surgery.     · You become ill before the surgery (such as fever, flu, or a cold).     · You need to reschedule or have changed your mind about having the surgery. Where can you learn more? Go to http://shraddha-giles.info/. Enter Y296 in the search box to learn more about \"Knee Arthroscopy: Before Your Surgery. \"  Current as of: June 26, 2019  Content Version: 12.2  © 5573-1153 [employer]. Care instructions adapted under license by [employer] (which disclaims liability or warranty for this information). If you have questions about a medical condition or this instruction, always ask your healthcare professional. [address] any warranty or liability for your use of this information.

## 2020-01-31 LAB
ALBUMIN SERPL-MCNC: 4.6 G/DL (ref 3.8–4.8)
ALBUMIN/GLOB SERPL: 1.9 {RATIO} (ref 1.2–2.2)
ALP SERPL-CCNC: 161 IU/L (ref 39–117)
ALT SERPL-CCNC: 11 IU/L (ref 0–32)
AST SERPL-CCNC: 13 IU/L (ref 0–40)
BASOPHILS # BLD AUTO: 0 X10E3/UL (ref 0–0.2)
BASOPHILS NFR BLD AUTO: 1 %
BILIRUB SERPL-MCNC: 0.4 MG/DL (ref 0–1.2)
BUN SERPL-MCNC: 8 MG/DL (ref 8–27)
BUN/CREAT SERPL: 12 (ref 12–28)
CALCIUM SERPL-MCNC: 10 MG/DL (ref 8.7–10.3)
CHLORIDE SERPL-SCNC: 99 MMOL/L (ref 96–106)
CHOLEST SERPL-MCNC: 190 MG/DL (ref 100–199)
CO2 SERPL-SCNC: 28 MMOL/L (ref 20–29)
CREAT SERPL-MCNC: 0.66 MG/DL (ref 0.57–1)
EOSINOPHIL # BLD AUTO: 0.1 X10E3/UL (ref 0–0.4)
EOSINOPHIL NFR BLD AUTO: 2 %
ERYTHROCYTE [DISTWIDTH] IN BLOOD BY AUTOMATED COUNT: 13.4 % (ref 11.7–15.4)
EST. AVERAGE GLUCOSE BLD GHB EST-MCNC: 103 MG/DL
GLOBULIN SER CALC-MCNC: 2.4 G/DL (ref 1.5–4.5)
GLUCOSE SERPL-MCNC: 90 MG/DL (ref 65–99)
HBA1C MFR BLD: 5.2 % (ref 4.8–5.6)
HCT VFR BLD AUTO: 38 % (ref 34–46.6)
HDLC SERPL-MCNC: 51 MG/DL
HGB BLD-MCNC: 12.3 G/DL (ref 11.1–15.9)
IMM GRANULOCYTES # BLD AUTO: 0 X10E3/UL (ref 0–0.1)
IMM GRANULOCYTES NFR BLD AUTO: 0 %
INTERPRETATION, 910389: NORMAL
LDLC SERPL CALC-MCNC: 107 MG/DL (ref 0–99)
LYMPHOCYTES # BLD AUTO: 2.8 X10E3/UL (ref 0.7–3.1)
LYMPHOCYTES NFR BLD AUTO: 40 %
MCH RBC QN AUTO: 27.2 PG (ref 26.6–33)
MCHC RBC AUTO-ENTMCNC: 32.4 G/DL (ref 31.5–35.7)
MCV RBC AUTO: 84 FL (ref 79–97)
MONOCYTES # BLD AUTO: 0.4 X10E3/UL (ref 0.1–0.9)
MONOCYTES NFR BLD AUTO: 6 %
NEUTROPHILS # BLD AUTO: 3.6 X10E3/UL (ref 1.4–7)
NEUTROPHILS NFR BLD AUTO: 51 %
PLATELET # BLD AUTO: 290 X10E3/UL (ref 150–450)
POTASSIUM SERPL-SCNC: 3.4 MMOL/L (ref 3.5–5.2)
PROT SERPL-MCNC: 7 G/DL (ref 6–8.5)
RBC # BLD AUTO: 4.53 X10E6/UL (ref 3.77–5.28)
SODIUM SERPL-SCNC: 143 MMOL/L (ref 134–144)
TRIGL SERPL-MCNC: 161 MG/DL (ref 0–149)
TSH SERPL DL<=0.005 MIU/L-ACNC: 1.72 UIU/ML (ref 0.45–4.5)
VLDLC SERPL CALC-MCNC: 32 MG/DL (ref 5–40)
WBC # BLD AUTO: 7 X10E3/UL (ref 3.4–10.8)

## 2020-02-01 NOTE — PROGRESS NOTES
Needs to eat a potassium rich diet. NML/Stable labs, no changes. Pt may schedule visit to review. Otherwise we will discuss at the next OV.

## 2020-06-06 DIAGNOSIS — R09.81 NASAL CONGESTION: ICD-10-CM

## 2020-06-08 RX ORDER — IPRATROPIUM BROMIDE 42 UG/1
SPRAY, METERED NASAL
Qty: 15 ML | Refills: 0 | Status: SHIPPED | OUTPATIENT
Start: 2020-06-08 | End: 2021-01-28 | Stop reason: SDUPTHER

## 2020-07-30 ENCOUNTER — OFFICE VISIT (OUTPATIENT)
Dept: INTERNAL MEDICINE CLINIC | Age: 70
End: 2020-07-30

## 2020-07-30 VITALS
RESPIRATION RATE: 17 BRPM | BODY MASS INDEX: 34.04 KG/M2 | SYSTOLIC BLOOD PRESSURE: 121 MMHG | TEMPERATURE: 97.9 F | HEIGHT: 62 IN | DIASTOLIC BLOOD PRESSURE: 65 MMHG | WEIGHT: 185 LBS | OXYGEN SATURATION: 98 % | HEART RATE: 76 BPM

## 2020-07-30 DIAGNOSIS — F32.A ANXIETY AND DEPRESSION: Primary | ICD-10-CM

## 2020-07-30 DIAGNOSIS — M95.8 OSTEOCHONDRAL DEFECT OF FEMORAL CONDYLE: ICD-10-CM

## 2020-07-30 DIAGNOSIS — F41.9 ANXIETY AND DEPRESSION: Primary | ICD-10-CM

## 2020-07-30 DIAGNOSIS — F33.1 DEPRESSION, MAJOR, RECURRENT, MODERATE (HCC): ICD-10-CM

## 2020-07-30 DIAGNOSIS — F17.210 CIGARETTE NICOTINE DEPENDENCE WITHOUT COMPLICATION: ICD-10-CM

## 2020-07-30 DIAGNOSIS — I10 ESSENTIAL HYPERTENSION WITH GOAL BLOOD PRESSURE LESS THAN 140/90: ICD-10-CM

## 2020-07-30 DIAGNOSIS — E78.2 MIXED HYPERLIPIDEMIA: ICD-10-CM

## 2020-07-30 PROBLEM — E66.01 SEVERE OBESITY (BMI 35.0-39.9) WITH COMORBIDITY (HCC): Status: RESOLVED | Noted: 2018-04-04 | Resolved: 2020-07-30

## 2020-07-30 NOTE — PROGRESS NOTES
Taylor Almaraz is a 72 y.o. female and presents with Follow-up (HTN, CHOL, knee pain); Labs (pt states that she was told by her psyche doctor that she needs a UDS and labs ); and Referral Request (pt states that she would like to be referred to a new psyche doctor, does not feel she's receiving the care she needs )    Subjective:  Dyslipidemia Review:  Patient presents for evaluation of lipids. Compliance with treatment thus far has been good. Denies myalgias, slurring speech, unilateral weakness, and chest pain. A repeat fasting lipid profile was NOT done/ordered. The patient does NOT use medications that may worsen dyslipidemias (corticosteroids, progestins, anabolic steroids, diuretics, beta-blockers, amiodarone, cyclosporine, olanzapine). The patient does try to exercise regularly. The patient is NOT known to have coexisting coronary artery disease. Taking Aspirin daily as prescribed/advised    Knee pain Review:  Patient complains of BILATERAL knee pain, IMPROVED lately. Pain Scale: 0 - No pain/10. Only takes IBU ~ 1 x monthly. Much improved range of motion. Told she had fragments and needs procedure, but held off due to COVID. Now pain is better. Hypertension Review:  The patient has hypertension  Diet and Lifestyle: generally does try to follow a  low sodium diet, eating more veggies and baked foods. Exercises sporadically. Limited due to arthritic knee pain. Home BP Monitoring: is measured at home, 120/60's  Pertinent ROS: taking medications as instructed, no medication side effects noted. No TIA's, chest pain on exertion, dyspnea on exertion, or swelling of ankles. Down to 2.5 cigs/d, using Nicotine gum now. BP Readings from Last 3 Encounters:   07/30/20 121/65   01/30/20 140/70   12/12/19 144/85     Pt here to request new psych for anxiety, changing her xanax dosing and doesn't like Christine's approach. Also told UDS was abnormal and labs were needed.     Review of Systems  Constitutional: negative for fevers, chills, anorexia and weight loss  Respiratory:  negative for hemoptysis, dyspnea, and wheezing  CV:   negative for chest pain, palpitations, and lower extremity edema  GI:   negative for nausea, vomiting, diarrhea, abdominal pain, and melena  Endo:               negative for polyuria,polydipsia,polyphagia, and heat intolerance  Genitourinary: negative for frequency, urgency, dysuria, retention, and hematuria  Integument:  negative for rash, ulcerations, and pruritus  Hematologic:  negative for easy bruising and bleeding  Musculoskel: negative for muscle weakness  Neurological:  negative for headaches, dizziness, vertigo,and memory problems  Behavl/Psych: negative for feelings of suicide, and mood changes    Past Medical History:   Diagnosis Date    Anxiety     Arthritis     knees    High cholesterol     Hypertension     Psychiatric disorder     anxiety     Past Surgical History:   Procedure Laterality Date    COLONOSCOPY N/A 10/15/2019    COLONOSCOPY performed by Jose Torres MD at \[facility]\"" ENDOSCOPY     Social History     Socioeconomic History    Marital status:      Spouse name: Not on file    Number of children: Not on file    Years of education: Not on file    Highest education level: Not on file   Tobacco Use    Smoking status: Former Smoker     Packs/day: 0.25     Types: Cigarettes     Last attempt to quit: 2019     Years since quittin.0    Smokeless tobacco: Never Used    Tobacco comment: pt states that she Quit 4 weeks ago    Substance and Sexual Activity    Alcohol use: No    Drug use: Never    Sexual activity: Not Currently     Family History   Problem Relation Age of Onset    Cancer Mother         throat    Cancer Father         lung    Hypertension Maternal Grandmother     Hypertension Maternal Grandfather     Hypertension Paternal Grandmother     Hypertension Paternal Grandfather      Current Outpatient Medications   Medication Sig Dispense Refill    ipratropium (ATROVENT) 42 mcg (0.06 %) nasal spray PLACE TWO SPRAYS IN BOTH NOSTRIL FOUR TIMES A DAY 15 mL 0    LINZESS 145 mcg cap capsule       ibuprofen (MOTRIN) 800 mg tablet Take 1 Tab by mouth every eight (8) hours as needed for Pain. 60 Tab 3    amLODIPine-atorvastatin (CADUET) 10-80 mg per tablet Take 1 Tab by mouth daily. 90 Tab 3    icosapent ethyL (VASCEPA) 1 gram capsule Take 2 Caps by mouth two (2) times a day. 360 Cap 3    ALPRAZolam (XANAX) 1 mg tablet Take half tablet in morning and take 1 tablet at night  Indications: Anxiousness associated with Depression 45 Tab 2    buPROPion XL (WELLBUTRIN XL) 300 mg XL tablet Take 1 Tab by mouth daily (after breakfast). Indications: Anxiousness associated with Depression 90 Tab 0    traZODone (DESYREL) 100 mg tablet TAKE ONE TABLET BY MOUTH ONCE NIGHTLY 90 Tab 0    aspirin delayed-release 81 mg tablet Take 1 Tab by mouth daily. 30 Tab 11    varicella-zoster recombinant, PF, (SHINGRIX, PF,) 50 mcg/0.5 mL susr injection Pharmacy to administer 0.5 mL. 0.5 mL 1     Allergies   Allergen Reactions    Citalopram Other (comments)     Not effective    Prozac [Fluoxetine] Anxiety     Irritable, restless       Objective:  Visit Vitals  /65 (BP 1 Location: Right arm, BP Patient Position: Sitting)   Pulse 76   Temp 97.9 °F (36.6 °C) (Oral)   Resp 17   Ht 5' 2\" (1.575 m)   Wt 185 lb (83.9 kg)   SpO2 98%   BMI 33.84 kg/m²     Wt Readings from Last 3 Encounters:   07/30/20 185 lb (83.9 kg)   01/30/20 193 lb (87.5 kg)   12/12/19 190 lb (86.2 kg)     Physical Exam:   General appearance - alert, well appearing, and in no distress. Mental status - A/O x 4, normal mood and affect. Neck -Supple ,normal CSP. FROM, non-tender. No significant adenopathy/thyromegaly. No JVD. Chest - CTA. Symmetric chest rise. No rhonchi, wheezing, or rales. Heart - Normal rate, regular rhythm. Normal S1, S2. No MGR or clicks. Abdomen - Soft,non-distended.  Normoactive BS in all quadrants. NT, no mass or HSM. Ext- Radial, DP pulses, 2+ bilaterally. No pedal edema, clubbing, or cyanosis. Skin-Warm and dry. No hyperpigmentation, ulcerations, or suspicious lesions. Neuro - Normal speech, no focal findings or movement disorder. Normal strength, gait, and muscle tone. Right knee- FROM. No deformity or edema    Assessment/Plan:  Referred to new psych per request. Labs deferred today, mostly normal last OV. Discussed xanax use and continuing to follow plan of care last advised by psych. See below for other orders   Follow-up and Dispositions    · Return in about 6 months (around 1/30/2021) for OV- LABS FOR CHOL, HTN, Smok Cess. ICD-10-CM ICD-9-CM    1. Anxiety and depression  F41.9 300.00 REFERRAL TO PSYCHIATRY    F32.9 311    2. Depression, major, recurrent, moderate (HCC)  F33.1 296.32    3. Mixed hyperlipidemia  E78.2 272.2    4. Essential hypertension with goal blood pressure less than 140/90  I10 401.9    5. Cigarette nicotine dependence without complication  U87.695 462.4    6. Osteochondral defect of femoral condyle  M95.8 738.8      Orders Placed This Encounter    REFERRAL TO PSYCHIATRY     Referral Priority:   Routine     Referral Type:   Behavioral Health     Referral Reason:   Specialty Services Required     Requested Specialty:   Psychiatry     Number of Visits Requested:   [phone] expressed understanding of plan. An After Visit Summary was offered/printed and given to the patient.

## 2020-07-30 NOTE — PATIENT INSTRUCTIONS

## 2020-08-28 ENCOUNTER — HOSPITAL ENCOUNTER (OUTPATIENT)
Dept: MAMMOGRAPHY | Age: 70
Discharge: HOME | End: 2020-08-28
Attending: NURSE PRACTITIONER
Payer: MEDICARE

## 2020-08-28 DIAGNOSIS — Z12.31 VISIT FOR SCREENING MAMMOGRAM: ICD-10-CM

## 2020-08-28 PROCEDURE — 77067 SCR MAMMO BI INCL CAD: CPT

## 2020-08-31 DIAGNOSIS — F41.9 ANXIETY AND DEPRESSION: ICD-10-CM

## 2020-08-31 DIAGNOSIS — F32.A ANXIETY AND DEPRESSION: ICD-10-CM

## 2020-08-31 NOTE — TELEPHONE ENCOUNTER
When is her appt with psych I referred her to? May refill after knowing this important task was done. If no appt, she will need to schedule one before I will refill med.

## 2020-09-01 RX ORDER — ALPRAZOLAM 1 MG/1
TABLET ORAL
Qty: 10 TAB | Refills: 0 | Status: SHIPPED | OUTPATIENT
Start: 2020-09-01 | End: 2021-01-28 | Stop reason: ALTCHOICE

## 2021-01-20 NOTE — PROGRESS NOTES
Psychiatric Outpatient Progress Note    Account Number:  [id number]  Name: Jayjay Rodrigues    SUBJECTIVE:    CHIEF COMPLAINT:  Jayjay Rodrigues is a 78 y.o. , AA female and was seen today for first follow-up of psychiatric condition and psychotropic medication management.      HPI:    Emma John reports the following psychiatric symptoms:  depression, anxiety and caregiver stress. The symptoms have been present for years and are of moderate severity. The symptoms occur daily. Pt reports that she is not doing better as  stress of caring for her  with dementia at home is overwhelming her. She has frequent anxiety attacks and crying spells. Sleep is Ok with trazodone. He requires total care and demands her constant attention. Eating too much and wants food all the time. He is falling a lot and she cannot pick him up as he is very heavy. She gets some help from her family. Continues to smoke 1/2 ppd. Weight is stable. BP is good today. Denies any psychosis or arlene.      Contributing factors include: caregiver burden.     Patient denies SI/HI/SIB.      Side Effects:  none       Fam/Soc Hx (from [place] with updates):       REVIEW OF SYSTEMS:  Psychiatric:  depression, anxiety, stress  Appetite:good   Sleep: good       OBJECTIVE:                 Mental Status exam: WNL except for      Sensorium  oriented to time, place and person   Relations cooperative and passive    Eye Contact    appropriate   Appearance:  age appropriate, casually dressed and overweight   Motor Behavior/Gait:  within normal limits   Speech:  normal pitch and normal volume   Thought Process: goal directed and logical   Thought Content free of delusions and free of hallucinations   Suicidal ideations none   Homicidal ideations none   Mood:  anxious and depressed   Affect:  anxious, labile and tearful   Memory recent  adequate   Memory remote:  adequate   Concentration:  adequate   Abstraction:  concrete   Insight:  fair   Reliability fair   Judgment:  fair       MEDICAL DECISION MAKING  Data: pertinent labs, imaging, medical records and diagnostic tests reviewed and incorporated in diagnosis and treatment plan    Allergies   Allergen Reactions    Citalopram Other (comments)     Not effective    Prozac [Fluoxetine] Anxiety     Irritable, restless        Current Outpatient Prescriptions   Medication Sig Dispense Refill    traZODone (DESYREL) 100 mg tablet Take 1 Tab by mouth nightly. 30 Tab 2    ALPRAZolam (XANAX) 1 mg tablet Take 1 Tab by mouth two (2) times daily as needed for Anxiety. Max Daily Amount: 2 mg. Indications: ANXIETY WITH DEPRESSION 60 Tab 2    buPROPion XL (WELLBUTRIN XL) 300 mg XL tablet Take 1 Tab by mouth daily (after breakfast). Indications: ANXIETY WITH DEPRESSION 30 Tab 2    icosapent ethyl (VASCEPA) 1 gram capsule Take 2 Caps by mouth two (2) times a day. 120 Cap 11    amLODIPine-atorvastatin (CADUET) 10-80 mg per tablet Take 1 Tab by mouth daily. 30 Tab 11    ipratropium (ATROVENT) 0.06 % nasal spray 2 Sprays by Both Nostrils route four (4) times daily. 15 mL 0    naproxen (NAPROSYN) 500 mg tablet Take 500 mg by mouth two (2) times daily (with meals).  aspirin delayed-release 81 mg tablet Take 1 Tab by mouth daily. 30 Tab 11        Visit Vitals    /76 (BP 1 Location: Left arm, BP Patient Position: Sitting)    Pulse 81    Ht 5' 5\" (1.651 m)    Wt 86.6 kg (191 lb)    SpO2 97%    BMI 31.78 kg/m2         Problems addressed today:    ICD-10-CM ICD-9-CM    1. Anxiety and depression F41.9 300.00 ALPRAZolam (XANAX) 1 mg tablet    F32.9 311    2. Cigarette nicotine dependence without complication B68.270 591.8        Assessment:   David Hernandez  is a 78 y.o.  female  Is partially responding to treatment. Symptoms are worsening . Patient denies SI/HI/SIB. No evidence of AH/VH or delusions. Risk Scoring- chronic illnesses and prescription drug management    Treatment Plan:  1.   Medications: Medication Changes/Adjustments: Increase Wellbutrin, increase Xanax and continue trazodone. Current Outpatient Prescriptions   Medication Sig Dispense Refill    traZODone (DESYREL) 100 mg tablet Take 1 Tab by mouth nightly. 30 Tab 2    ALPRAZolam (XANAX) 1 mg tablet Take 1 Tab by mouth two (2) times daily as needed for Anxiety. Max Daily Amount: 2 mg. Indications: ANXIETY WITH DEPRESSION 60 Tab 2    buPROPion XL (WELLBUTRIN XL) 300 mg XL tablet Take 1 Tab by mouth daily (after breakfast). Indications: ANXIETY WITH DEPRESSION 30 Tab 2    icosapent ethyl (VASCEPA) 1 gram capsule Take 2 Caps by mouth two (2) times a day. 120 Cap 11    amLODIPine-atorvastatin (CADUET) 10-80 mg per tablet Take 1 Tab by mouth daily. 30 Tab 11    ipratropium (ATROVENT) 0.06 % nasal spray 2 Sprays by Both Nostrils route four (4) times daily. 15 mL 0    naproxen (NAPROSYN) 500 mg tablet Take 500 mg by mouth two (2) times daily (with meals).  aspirin delayed-release 81 mg tablet Take 1 Tab by mouth daily. 27 Tab 11                  The following regarding medications was addressed:    (The risks and benefits of the proposed medication; the potential medication side effects ie    dry mouth, weight gain, GI upset, headache; patient given opportunity to ask questions)       2. Counseling and coordination of care including instructions for treatment, risks/benefits, risk factor reduction and patient/family education. She agrees with the plan. Patient instructed to call with any side effects, questions or issues. 3.  Follow-up Disposition:  Return in about 3 months (around 10/19/2017). 4. Counseled on smoking cessation. 5. Supportive counseling on her caregiver burden.         PSYCHOTHERAPY:  approx 20 minutes  Type:  Supportive/Solution Focused psychotherapy provided  Focus:     Current problems:   Caregiver burden   Medical issues     Psychoeducation provided on psych medications    Treatment plan reviewed with patient-including diagnosis and medications      Gary Gaxiola is not progressing.     Alyssa Lechuga MD  7/19/2017 21-Sep-2020

## 2021-01-28 ENCOUNTER — OFFICE VISIT (OUTPATIENT)
Dept: INTERNAL MEDICINE CLINIC | Age: 71
End: 2021-01-28
Payer: MEDICARE

## 2021-01-28 VITALS
WEIGHT: 163.2 LBS | HEIGHT: 62 IN | DIASTOLIC BLOOD PRESSURE: 68 MMHG | BODY MASS INDEX: 30.03 KG/M2 | HEART RATE: 83 BPM | RESPIRATION RATE: 16 BRPM | TEMPERATURE: 96.9 F | OXYGEN SATURATION: 96 % | SYSTOLIC BLOOD PRESSURE: 111 MMHG

## 2021-01-28 DIAGNOSIS — F41.9 ANXIETY AND DEPRESSION: Primary | ICD-10-CM

## 2021-01-28 DIAGNOSIS — R09.81 NASAL CONGESTION: ICD-10-CM

## 2021-01-28 DIAGNOSIS — F32.A ANXIETY AND DEPRESSION: Primary | ICD-10-CM

## 2021-01-28 DIAGNOSIS — R63.4 UNINTENDED WEIGHT LOSS: ICD-10-CM

## 2021-01-28 DIAGNOSIS — E78.2 MIXED HYPERLIPIDEMIA: ICD-10-CM

## 2021-01-28 DIAGNOSIS — F17.210 CIGARETTE NICOTINE DEPENDENCE WITHOUT COMPLICATION: ICD-10-CM

## 2021-01-28 PROCEDURE — 99214 OFFICE O/P EST MOD 30 MIN: CPT | Performed by: NURSE PRACTITIONER

## 2021-01-28 RX ORDER — ESCITALOPRAM OXALATE 10 MG/1
20 TABLET ORAL
COMMUNITY
Start: 2021-01-22 | End: 2021-10-12 | Stop reason: SINTOL

## 2021-01-28 RX ORDER — LORAZEPAM 0.5 MG/1
TABLET ORAL
COMMUNITY
Start: 2021-01-23

## 2021-01-28 RX ORDER — IPRATROPIUM BROMIDE 42 UG/1
2 SPRAY, METERED NASAL
Qty: 15 ML | Refills: 0 | Status: SHIPPED | OUTPATIENT
Start: 2021-01-28 | End: 2021-08-25

## 2021-01-28 NOTE — PATIENT INSTRUCTIONS
Eat at least THREE TIMES a day. You may try ENSURE/BOOST/GLUCERNA, up to three times a day to supplement meals. You may also try the PROTEIN SHAKES with snacks. Need to eat at least 6975-1342 CALORIES a day. Abnormal Weight Loss: Care Instructions  Your Care Instructions     There are two types of weight loss--normal and abnormal. The normal kind happens when you are trying to lose weight by exercising more or eating less. The abnormal kind happens when you are not trying to lose weight. Many medical problems can cause abnormal weight loss. These include problems with your thyroid gland, long-term infections, mouth or throat problems that make it hard to eat, and digestive problems. They also include depression and cancer. Some medicines also may cause you to lose weight. You can work with your doctor to find the cause of your weight loss. You will probably need tests to do this. Follow-up care is a key part of your treatment and safety. Be sure to make and go to all appointments, and call your doctor if you are having problems. It's also a good idea to know your test results and keep a list of the medicines you take. How can you care for yourself at home? · Weigh yourself at the same time every day. It's best to do it first thing in the morning after you empty your bladder. Be sure to always wear the same amount of clothing. · Write down any changes in your weight and the possible causes. Discuss these with your doctor. · Your doctor may want you to change your diet. Do your best to follow his or her advice. · Ask your doctor if you should see a dietitian. This is a person who can help you plan meals that work best for your lifestyle. · Note any changes in bowel habits. These may include changes in how often you have a bowel movement. Other changes include the color and size of your stools and how solid they are. · If you are prescribed medicines, take them exactly as prescribed.  Call your doctor if you think you are having a problem with your medicine. You will get more details on the specific medicines your doctor prescribes. When should you call for help? Watch closely for changes in your health, and be sure to contact your doctor if:    · You do not get better as expected.     · You continue to lose weight. Where can you learn more? Go to http://www.gray.com/  Enter A790 in the search box to learn more about \"Abnormal Weight Loss: Care Instructions. \"  Current as of: December 11, 2019               Content Version: 12.6  © 9919-1174 [employer]. Care instructions adapted under license by [employer] (which disclaims liability or warranty for this information). If you have questions about a medical condition or this instruction, always ask your healthcare professional. [address] any warranty or liability for your use of this information.

## 2021-01-28 NOTE — PROGRESS NOTES
Pt is here for   Chief Complaint   Patient presents with    Follow-up     CHOL, Labs. .. HTN, Smoking Cess     Pt denies pain at this time    1. Have you been to the ER, urgent care clinic since your last visit? Hospitalized since your last visit? No    2. Have you seen or consulted any other health care providers outside of the [address] [place] [address] since your last visit? Include any pap smears or colon screening.  No

## 2021-01-28 NOTE — PROGRESS NOTES
Kirit Wallis (: 1950) is a 80 y.o. female, established patient, here for evaluation of the following chief complaint(s):  Follow-up (CHOL, Labs. .. HTN, Smoking Cess)       ASSESSMENT/PLAN:  1. Anxiety and depression  -     REFERRAL TO PSYCHIATRY  -     REFERRAL TO PSYCHOLOGY    2. Nasal congestion    3. Unintended weight loss  -     METABOLIC PANEL, COMPREHENSIVE  -     CBC WITH AUTOMATED DIFF  -     TSH 3RD GENERATION    4. Mixed hyperlipidemia  -     LIPID PANEL    5. Cigarette nicotine dependence without complication        Return in about 4 weeks (around 2021) for OV- YULY/MDD, wt loss, psych f/u, lab review. Pt asked to complete follow by next visit: CALL psych if wanted, but start therapy by next visit. Have labs collected to r/o metabolic cause of symptoms also. Encouraged to take meal replacement when not eating to avoid further weight loss. SUBJECTIVE/OBJECTIVE:  HPI    Pt presents for worsening mood, wants to change providers for MH. Dismayed that she has not SEEN the person, only spoken with on the phone. Currently without any therapist. Geneva Nicholas she is having headaches and decreased appetite with medication use. Started on Lexapro, recently increased from 5 mg to 10 mg and advised to lower ativan dosing from 1 mg to 0.5 mg PRN. Sleeping more lately. With use of trazodone 100 mg instead of the 300 mg she reports taking with Dr. Scarlet Cruz before. Pt presents to f/u CHOL, HTN, & Smok cessation. Taking medications. Feels worse, losing weight since eating less and crying more. Has lost ~ 20 lbs. No acute complaints otherwise. Denies side effects from medication. No report of unilateral weakness, headaches, blurring vision, CP, SOB,or  leg swelling. Smoking 1/2 ppd. Awakens overnight with nasal congestion, using nasal spray with relief, but needs refill. Feels it is due to  using heater overnight.      Review of Systems  Constitutional: negative for fevers, chills, anorexia and weight loss  Respiratory:  negative for cough, hemoptysis, dyspnea, and wheezing  CV:   negative for chest pain, palpitations, and lower extremity edema  GI:   negative for nausea, vomiting, diarrhea, abdominal pain, and melena  Endo:               negative for polyuria,polydipsia,polyphagia, and heat intolerance  Genitourinary: negative for frequency, urgency, dysuria, retention, and hematuria  Integument:  negative for rash, ulcerations, and pruritus  Hematologic:  negative for easy bruising and bleeding  Musculoskel: negative for arthralgias, muscle weakness,and joint pain/swelling  Neurological:  negative for headaches, dizziness, vertigo,and memory/gait problems  Behavl/Psych: negative for feelings of anxiety, depression, suicide, and mood changes    Visit Vitals  /68 (BP 1 Location: Right arm, BP Patient Position: Sitting)   Pulse 83   Temp 96.9 °F (36.1 °C) (Oral)   Resp 16   Ht 5' 2\" (1.575 m)   Wt 163 lb 3.2 oz (74 kg)   SpO2 96%   BMI 29.85 kg/m²       Wt Readings from Last 3 Encounters:   01/28/21 163 lb 3.2 oz (74 kg)   07/30/20 185 lb (83.9 kg)   01/30/20 193 lb (87.5 kg)         Physical Exam:   General appearance - alert, well appearing, and in mild distress. Mental status - A/O x 4,anxious mood and affect. +hyperverbal.  Chest - CTA. Symmetric chest rise. No wheezing. No distress. Heart - Normal rate & rhythm. Normal S1 & S2. No MGR. Abdomen- Soft, round. Non-distended, NT. No pulsatile masses or hernias. Ext-  No pedal edema, clubbing, or cyanosis. Skin-Warm and dry. No hyperpigmentation, ulcerations, or suspicious lesions. Neuro - Normal speech, no focal findings or movement disorder. Normal strength, gait, and muscle tone. On this date 01/28/21 I have spent 42 minutes reviewing previous notes, test results and face to face with the patient discussing the diagnosis and importance of compliance with the treatment plan as well as documenting on the day of the visit.     An electronic signature was used to authenticate this note.   -- Lord Lauren NP

## 2021-01-29 LAB
ALBUMIN SERPL-MCNC: 4.7 G/DL (ref 3.8–4.8)
ALBUMIN/GLOB SERPL: 1.5 {RATIO} (ref 1.2–2.2)
ALP SERPL-CCNC: 155 IU/L (ref 39–117)
ALT SERPL-CCNC: 10 IU/L (ref 0–32)
AST SERPL-CCNC: 16 IU/L (ref 0–40)
BASOPHILS # BLD AUTO: 0 X10E3/UL (ref 0–0.2)
BASOPHILS NFR BLD AUTO: 1 %
BILIRUB SERPL-MCNC: 0.6 MG/DL (ref 0–1.2)
BUN SERPL-MCNC: 16 MG/DL (ref 8–27)
BUN/CREAT SERPL: 14 (ref 12–28)
CALCIUM SERPL-MCNC: 10.7 MG/DL (ref 8.7–10.3)
CHLORIDE SERPL-SCNC: 98 MMOL/L (ref 96–106)
CHOLEST SERPL-MCNC: 180 MG/DL (ref 100–199)
CO2 SERPL-SCNC: 26 MMOL/L (ref 20–29)
CREAT SERPL-MCNC: 1.13 MG/DL (ref 0.57–1)
EOSINOPHIL # BLD AUTO: 0.1 X10E3/UL (ref 0–0.4)
EOSINOPHIL NFR BLD AUTO: 1 %
ERYTHROCYTE [DISTWIDTH] IN BLOOD BY AUTOMATED COUNT: 14 % (ref 11.7–15.4)
GLOBULIN SER CALC-MCNC: 3.2 G/DL (ref 1.5–4.5)
GLUCOSE SERPL-MCNC: 123 MG/DL (ref 65–99)
HCT VFR BLD AUTO: 40 % (ref 34–46.6)
HDLC SERPL-MCNC: 57 MG/DL
HGB BLD-MCNC: 12.8 G/DL (ref 11.1–15.9)
IMM GRANULOCYTES # BLD AUTO: 0 X10E3/UL (ref 0–0.1)
IMM GRANULOCYTES NFR BLD AUTO: 0 %
INTERPRETATION, 910389: NORMAL
INTERPRETATION: NORMAL
LDLC SERPL CALC-MCNC: 99 MG/DL (ref 0–99)
LYMPHOCYTES # BLD AUTO: 2.9 X10E3/UL (ref 0.7–3.1)
LYMPHOCYTES NFR BLD AUTO: 40 %
MCH RBC QN AUTO: 27.8 PG (ref 26.6–33)
MCHC RBC AUTO-ENTMCNC: 32 G/DL (ref 31.5–35.7)
MCV RBC AUTO: 87 FL (ref 79–97)
MONOCYTES # BLD AUTO: 0.5 X10E3/UL (ref 0.1–0.9)
MONOCYTES NFR BLD AUTO: 7 %
NEUTROPHILS # BLD AUTO: 3.7 X10E3/UL (ref 1.4–7)
NEUTROPHILS NFR BLD AUTO: 51 %
PDF IMAGE, 910387: NORMAL
PLATELET # BLD AUTO: 356 X10E3/UL (ref 150–450)
POTASSIUM SERPL-SCNC: 3.4 MMOL/L (ref 3.5–5.2)
PROT SERPL-MCNC: 7.9 G/DL (ref 6–8.5)
RBC # BLD AUTO: 4.61 X10E6/UL (ref 3.77–5.28)
SODIUM SERPL-SCNC: 142 MMOL/L (ref 134–144)
TRIGL SERPL-MCNC: 134 MG/DL (ref 0–149)
TSH SERPL DL<=0.005 MIU/L-ACNC: 1.68 UIU/ML (ref 0.45–4.5)
VLDLC SERPL CALC-MCNC: 24 MG/DL (ref 5–40)
WBC # BLD AUTO: 7.2 X10E3/UL (ref 3.4–10.8)

## 2021-01-29 NOTE — PROGRESS NOTES
NML/Stable labs, no changes. We can discuss at the next OV, if pt would like.        Thanks, [email or web address], LEONARDOC.

## 2021-02-02 ENCOUNTER — VIRTUAL VISIT (OUTPATIENT)
Dept: INTERNAL MEDICINE CLINIC | Age: 71
End: 2021-02-02
Payer: MEDICARE

## 2021-02-02 DIAGNOSIS — F43.25 ADJUSTMENT DISORDER WITH MIXED DISTURBANCE OF EMOTIONS AND CONDUCT: Primary | ICD-10-CM

## 2021-02-02 PROCEDURE — 90791 PSYCH DIAGNOSTIC EVALUATION: CPT | Performed by: SOCIAL WORKER

## 2021-02-02 PROCEDURE — G9717 DOC PT DX DEP/BP F/U NT REQ: HCPCS | Performed by: SOCIAL WORKER

## 2021-02-02 PROCEDURE — G8427 DOCREV CUR MEDS BY ELIG CLIN: HCPCS | Performed by: SOCIAL WORKER

## 2021-02-02 NOTE — PROGRESS NOTES
Anshul June (: 1950) is a 80 y.o. female, new patient, here for evaluation of the following chief complaint(s):   Establish Care (vv telephone covid) and Psychotherapy             Anshul June is being evaluated by a Virtual Visit (video visit) encounter to address concerns as mentioned above. A caregiver was present when appropriate. Due to this being a TeleHealth encounter (During [address] emergency), evaluation of the following organ systems was limited: Vitals/Constitutional/EENT/Resp/CV/GI//MS/Neuro/Skin/Heme-Lymph-Imm. Pursuant to the emergency declaration under the [address] [place] [address] authority and the [employer] Act, this Virtual Visit was conducted with patient's (and/or legal guardian's) consent, to reduce the patient's risk of exposure to COVID-19 and provide necessary medical care. The patient (and/or legal guardian) has also been advised to contact this office for worsening conditions or problems, and seek emergency medical treatment and/or call 911 if deemed necessary. Services were provided through a video synchronous discussion virtually to substitute for in-person clinic visit. Patient was located at home and provider was located in office or at home. An electronic signature was used to authenticate this note.   -- Luis Armando Macias LCSW   History of Present Illness: Anshul June is a 80 y.o. female who presents with symptoms of depression and anxiety    Duration of session:     Mental Status exam:         Sensorium  oriented to time, place and person   Relations cooperative   Appearance:  na   Motor Behavior:  na   Speech:  normal pitch and normal volume   Thought Process: goal directed   Thought Content free of delusions, free of hallucinations and not internally preoccupied    Suicidal ideations none   Homicidal ideations none   Mood:  anxious and stable Affect:  anxious   Memory recent  adequate   Memory remote:  adequate   Concentration:  adequate   Abstraction:  concrete   Insight:  fair   Reliability good   Judgment:  good         DIAGNOSIS AND IMPRESSION:      Axis I: Anxiety Disorder NOS  Axis II: No diagnosis  Axis III:   Past Medical History:   Diagnosis Date    Anxiety     Arthritis     knees    High cholesterol     Hypertension     Psychiatric disorder     anxiety     Axis IV: Problems with primary support group, Problems related to social environment and Other psychosocial or environmental problems  Axis V:  61-70 mild symptoms      Strengths: donis, caregiving, hard working  Trauma: n/a    Client presents via telephone covid for initial encounter with this therapist, reports hx of psychiatry, needing to reestablish with one, msg'g pcp about resource as pt does not have email. Has a lot of caregiving responsibilities of her  and aunt. Stays in the house by herself most of the time. Enjoys cooking and her two young grandsons. This note will not be viewable in MyChart for the following reason(s). This is a Psychotherapy Note.  ([address] [facility] [address] Providers Only)

## 2021-02-16 ENCOUNTER — VIRTUAL VISIT (OUTPATIENT)
Dept: INTERNAL MEDICINE CLINIC | Age: 71
End: 2021-02-16
Payer: MEDICARE

## 2021-02-16 DIAGNOSIS — F32.A ANXIETY AND DEPRESSION: Primary | ICD-10-CM

## 2021-02-16 DIAGNOSIS — F41.9 ANXIETY AND DEPRESSION: Primary | ICD-10-CM

## 2021-02-16 PROCEDURE — G8427 DOCREV CUR MEDS BY ELIG CLIN: HCPCS | Performed by: SOCIAL WORKER

## 2021-02-16 PROCEDURE — 90834 PSYTX W PT 45 MINUTES: CPT | Performed by: SOCIAL WORKER

## 2021-02-16 PROCEDURE — G9717 DOC PT DX DEP/BP F/U NT REQ: HCPCS | Performed by: SOCIAL WORKER

## 2021-02-16 NOTE — PROGRESS NOTES
This note will not be viewable in [employer] for the following reason(s). This is a Psychotherapy Note. ([address] Providers Only)    Savita Pineda is being evaluated by a Virtual Visit (video visit) encounter to address concerns as mentioned above. A caregiver was present when appropriate. Due to this being a TeleHealth encounter (During UGXWL-69 public health emergency), evaluation of the following organ systems was limited: Vitals/Constitutional/EENT/Resp/CV/GI//MS/Neuro/Skin/Heme-Lymph-Imm. Pursuant to the emergency declaration under the [facility], [address] [place] [address] authority and the [employer] Act, this Virtual Visit was conducted with patient's (and/or legal guardian's) consent, to reduce the patient's risk of exposure to COVID-19 and provide necessary medical care. The patient (and/or legal guardian) has also been advised to contact this office for worsening conditions or problems, and seek emergency medical treatment and/or call 911 if deemed necessary.           Services were provided through a video synchronous discussion virtually to substitute for in-person clinic visit. Patient was located at home and provider was located in office or at home.      An electronic signature was used to authenticate this note.   -- Margarita Mello LCSW   History of Present Illness: Savita Pineda is a 80 y.o. female who presents with symptoms of depression and anxiety     Duration of session: 38+ min     Mental Status exam:           Sensorium  oriented to time, place and person   Relations cooperative   Appearance:  na   Motor Behavior:  na   Speech:  normal pitch and normal volume   Thought Process: goal directed   Thought Content free of delusions, free of hallucinations and not internally preoccupied    Suicidal ideations none   Homicidal ideations none   Mood:  anxious and stable   Affect:  anxious   Memory recent adequate   Memory remote:  adequate   Concentration:  adequate   Abstraction:  concrete   Insight:  fair   Reliability good   Judgment:  good            DIAGNOSIS AND IMPRESSION:        Axis I: Anxiety Disorder NOS  Axis II: No diagnosis  Axis III:        Past Medical History:   Diagnosis Date    Anxiety      Arthritis       knees    High cholesterol      Hypertension      Psychiatric disorder       anxiety      Axis IV: Problems with primary support group, Problems related to social environment and Other psychosocial or environmental problems  Axis V:  61-70 mild symptoms        Strengths: donis, caregiving, hard working  Trauma: n/a     Client presents via telephone covid for follow up encounter with this therapist, reports some improvement in attitude but still same stressors, expressed some relief in processing her stressors.

## 2021-02-25 ENCOUNTER — OFFICE VISIT (OUTPATIENT)
Dept: INTERNAL MEDICINE CLINIC | Age: 71
End: 2021-02-25
Payer: MEDICARE

## 2021-02-25 VITALS
HEART RATE: 79 BPM | DIASTOLIC BLOOD PRESSURE: 75 MMHG | OXYGEN SATURATION: 97 % | BODY MASS INDEX: 30.55 KG/M2 | RESPIRATION RATE: 18 BRPM | TEMPERATURE: 96.9 F | HEIGHT: 62 IN | SYSTOLIC BLOOD PRESSURE: 127 MMHG | WEIGHT: 166 LBS

## 2021-02-25 DIAGNOSIS — R63.4 UNINTENDED WEIGHT LOSS: Primary | ICD-10-CM

## 2021-02-25 DIAGNOSIS — F32.A ANXIETY AND DEPRESSION: ICD-10-CM

## 2021-02-25 DIAGNOSIS — E78.2 MIXED HYPERLIPIDEMIA: ICD-10-CM

## 2021-02-25 DIAGNOSIS — F41.9 ANXIETY AND DEPRESSION: ICD-10-CM

## 2021-02-25 PROCEDURE — G9717 DOC PT DX DEP/BP F/U NT REQ: HCPCS | Performed by: NURSE PRACTITIONER

## 2021-02-25 PROCEDURE — G8754 DIAS BP LESS 90: HCPCS | Performed by: NURSE PRACTITIONER

## 2021-02-25 PROCEDURE — G8427 DOCREV CUR MEDS BY ELIG CLIN: HCPCS | Performed by: NURSE PRACTITIONER

## 2021-02-25 PROCEDURE — 1101F PT FALLS ASSESS-DOCD LE1/YR: CPT | Performed by: NURSE PRACTITIONER

## 2021-02-25 PROCEDURE — G8536 NO DOC ELDER MAL SCRN: HCPCS | Performed by: NURSE PRACTITIONER

## 2021-02-25 PROCEDURE — G9899 SCRN MAM PERF RSLTS DOC: HCPCS | Performed by: NURSE PRACTITIONER

## 2021-02-25 PROCEDURE — G8417 CALC BMI ABV UP PARAM F/U: HCPCS | Performed by: NURSE PRACTITIONER

## 2021-02-25 PROCEDURE — 3017F COLORECTAL CA SCREEN DOC REV: CPT | Performed by: NURSE PRACTITIONER

## 2021-02-25 PROCEDURE — G8752 SYS BP LESS 140: HCPCS | Performed by: NURSE PRACTITIONER

## 2021-02-25 PROCEDURE — 1090F PRES/ABSN URINE INCON ASSESS: CPT | Performed by: NURSE PRACTITIONER

## 2021-02-25 PROCEDURE — 99214 OFFICE O/P EST MOD 30 MIN: CPT | Performed by: NURSE PRACTITIONER

## 2021-02-25 PROCEDURE — G8399 PT W/DXA RESULTS DOCUMENT: HCPCS | Performed by: NURSE PRACTITIONER

## 2021-02-25 RX ORDER — ICOSAPENT ETHYL 1000 MG/1
2 CAPSULE ORAL 2 TIMES DAILY
Qty: 360 CAP | Refills: 3 | Status: SHIPPED | OUTPATIENT
Start: 2021-02-25 | End: 2022-07-19 | Stop reason: SDUPTHER

## 2021-02-25 RX ORDER — FAMOTIDINE 40 MG/1
40 TABLET, FILM COATED ORAL DAILY
Qty: 60 TAB | Refills: 2 | Status: SHIPPED | OUTPATIENT
Start: 2021-02-25 | End: 2021-08-25 | Stop reason: SDUPTHER

## 2021-02-25 NOTE — PROGRESS NOTES
Pt is here for   Chief Complaint   Patient presents with    Follow-up     YULY, MDD wt loss psyche, lab review      1. Have you been to the ER, urgent care clinic since your last visit? Hospitalized since your last visit? No    2. Have you seen or consulted any other health care providers outside of the [address] [place] [address] since your last visit? Include any pap smears or colon screening.  No    Denies pain at this time

## 2021-02-25 NOTE — PATIENT INSTRUCTIONS
Eat at least THREE TIMES a day. You may try ENSURE/BOOST/GLUCERNA, up to three times a day to supplement meals. You may also try the PROTEIN SHAKES with snacks. Need to eat at least 2262-5729 CALORIES a day. Please evaluate patient for anxiety.     Please call Dr. Belinda Beach  Phone: [phone]         Learning About Mindfulness for Stress  What are mindfulness and stress? Stress is what you feel when you have to handle more than you are used to. A lot of things can cause stress. You may feel stress when you go on a job interview, take a test, or run a race. This kind of short-term stress is normal and even useful. It can help you if you need to work hard or react quickly. Stress also can last a long time. Long-term stress is caused by stressful situations or events. Examples of long-term stress include long-term health problems, ongoing problems at work, and conflicts in your family. Long-term stress can harm your health. Mindfulness is a focus only on things happening in the present moment. It's a process of purposefully paying attention to and being aware of your surroundings, your emotions, your thoughts, and how your body feels. You are aware of these things, but you aren't judging these experiences as \"good\" or \"bad. \" Mindfulness can help you learn to calm your mind and body to help you cope with illness, pain, and stress. How does mindfulness help to relieve stress? Mindfulness can help quiet your mind and relax your body. Studies show that it can help some people sleep better, feel less anxious, and bring their blood pressure down. And it's been shown to help some people live and cope better with certain health problems like heart disease, depression, chronic pain, and cancer. How do you practice mindfulness? To be mindful is to pay attention, to be present, and to be accepting. · When you're mindful, you do just one thing and you pay close attention to that one thing.  For example, you may sit quietly and notice your emotions or how your food tastes and smells. · When you're present, you focus on the things that are happening right now. You let go of your thoughts about the past and the future. When you dwell on the past or the future, you miss moments that can heal and strengthen you. You may miss moments like hearing a child laugh or seeing a friendly face when you think you're all alone. · When you're accepting, you don't  the present moment. Instead you accept your thoughts and feelings as they come. You can practice anytime, anywhere, and in any way you choose. You can practice in many ways. Here are a few ideas:  · While doing your chores, like washing the dishes, let your mind focus on what's in your hand. What does the dish feel like? Is the water warm or cold? · Go outside and take a few deep breaths. What is the air like? Is it warm or cold? · When you can, take some time at the start of your day to sit alone and think. · Take a slow walk by yourself. Count your steps while you breathe in and out. · Try yoga breathing exercises, stretches, and poses to strengthen and relax your muscles. · At work, if you can, try to stop for a few moments each hour. Note how your body feels. Let yourself regroup and let your mind settle before you return to what you were doing. · If you struggle with anxiety or \"worry thoughts,\" imagine your mind as a blue juana and your worry thoughts as clouds. Now imagine those worry thoughts floating across your mind's juana. Just let them pass by as you watch. Follow-up care is a key part of your treatment and safety. Be sure to make and go to all appointments, and call your doctor if you are having problems. It's also a good idea to know your test results and keep a list of the medicines you take. Where can you learn more?   Go to http://www.gray.com/  Enter M676 in the search box to learn more about \"Learning About Mindfulness for Stress. \"  Current as of: December 16, 2019               Content Version: 12.6  © 7633-4851 [employer]. Care instructions adapted under license by [employer] (which disclaims liability or warranty for this information). If you have questions about a medical condition or this instruction, always ask your healthcare professional. [address] any warranty or liability for your use of this information.

## 2021-02-25 NOTE — PROGRESS NOTES
Deshawn Gandhi (: 1950) is a 80 y.o. female, established patient, here for evaluation of the following chief complaint(s):  Follow-up (YULY, MDD wt loss psyche, lab review )       ASSESSMENT/PLAN:  1. Unintended weight loss  -     famotidine (PEPCID) 40 mg tablet; Take 1 Tab by mouth daily. To Replace (omeprazole, pantoprazole, or similar), Normal, Disp-60 Tab, R-2May substitute with 20 mg tabs if 40 mg tabs unavailable. 2. Mixed hyperlipidemia  -     icosapent ethyL (VASCEPA) 1 gram capsule; Take 2 Caps by mouth two (2) times a day., Normal, Disp-360 Cap, R-3    3. Anxiety and depression        Return in about 4 weeks (around 3/25/2021) for OV- BMI, GERD? .      Pt asked to complete follow by next visit: include supplement. SUBJECTIVE/OBJECTIVE:  HPI    Pt presents to f/u YULY/MDD, wt loss, psych, and lab review. Eating more now, feeling hungry more now. Taking meds as last prescribed. Denies side effects from medication. Feels still anxious with hand shaking. Reports falling following last visit en route to labs and dropped all her papers. No acute complaints otherwise. YULY 2/7 2021   Feeling nervous, anxious or on edge? 2   Not being able to stop or control worrying? 1   YULY-2 Subtotal 3   Worrying too much about different things? 3   Trouble relaxing? 1   Being so restless that it is hard to sit still? 1   Becoming easily annoyed or irritable? 3   Feeling afraid as if something awful might happen? 2   YULY-7 Total Score 13   YULY-7 Result Moderate Anxiety   If you checked off any problems, how difficult have these problems made it for you to do your work, take care of thinks at home, or get along with other people?   Somewhat difficult     3 most recent PHQ Screens 2021   PHQ Not Done -   Little interest or pleasure in doing things Several days   Feeling down, depressed, irritable, or hopeless Several days   Total Score PHQ 2 2   Trouble falling or staying asleep, or sleeping too much Several days   Feeling tired or having little energy Not at all   Poor appetite, weight loss, or overeating Several days   Feeling bad about yourself - or that you are a failure or have let yourself or your family down Nearly every day   Trouble concentrating on things such as school, work, reading, or watching TV Several days   Moving or speaking so slowly that other people could have noticed; or the opposite being so fidgety that others notice Several days   Thoughts of being better off dead, or hurting yourself in some way Not at all   PHQ 9 Score 9   How difficult have these problems made it for you to do your work, take care of your home and get along with others Somewhat difficult           Review of Systems  Constitutional: negative for fevers, chills  Respiratory:  negative for cough, hemoptysis, dyspnea, and wheezing  CV:   negative for chest pain, palpitations, and lower extremity edema  GI:   negative for nausea, vomiting, diarrhea, abdominal pain, and melena  Endo:               negative for polyuria,polydipsia,polyphagia, and heat intolerance  Genitourinary: negative for frequency, urgency, dysuria, retention, and hematuria  Integument:  negative for rash, ulcerations, and pruritus  Hematologic:  negative for easy bruising and bleeding  Musculoskel: negative for arthralgias, muscle weakness,and joint pain/swelling  Neurological:  negative for headaches, dizziness, vertigo,and memory/gait problems  Behavl/Psych: negative for feelings of suicide    Visit Vitals  /75 (BP 1 Location: Right lower arm, BP Patient Position: Sitting, BP Cuff Size: Large adult)   Pulse 79   Temp 96.9 °F (36.1 °C) (Oral)   Resp 18   Ht 5' 2\" (1.575 m)   Wt 166 lb (75.3 kg)   SpO2 97%   BMI 30.36 kg/m²       Wt Readings from Last 3 Encounters:   02/25/21 166 lb (75.3 kg)   01/28/21 163 lb 3.2 oz (74 kg)   07/30/20 185 lb (83.9 kg)         Physical Exam:   General appearance - alert, well appearing, and in no distress. Mental status - A/O x 4, normal mood and affect. Chest - CTA. Symmetric chest rise. No wheezing. No distress. Heart - Normal rate & rhythm. Normal S1 & S2. No MGR. Abdomen- Soft, round. Hypoactive BS in all quadrants. Non-distended, NT. No pulsatile masses or hernias. Ext-  No pedal edema, clubbing, or cyanosis. Skin-Warm and dry. No hyperpigmentation, ulcerations, or suspicious lesions. Neuro - Normal speech, no focal findings or movement disorder. Normal strength, gait, and muscle tone. An electronic signature was used to authenticate this note.   -- Brenton Gonzalez NP

## 2021-03-08 ENCOUNTER — IMMUNIZATION (OUTPATIENT)
Dept: INTERNAL MEDICINE CLINIC | Age: 71
End: 2021-03-08
Payer: MEDICARE

## 2021-03-08 DIAGNOSIS — Z23 ENCOUNTER FOR IMMUNIZATION: Primary | ICD-10-CM

## 2021-03-08 PROCEDURE — 91300 COVID-19, MRNA, LNP-S, PF, 30MCG/0.3ML DOSE(PFIZER): CPT | Performed by: FAMILY MEDICINE

## 2021-03-08 PROCEDURE — 0001A COVID-19, MRNA, LNP-S, PF, 30MCG/0.3ML DOSE(PFIZER): CPT | Performed by: FAMILY MEDICINE

## 2021-03-18 ENCOUNTER — VIRTUAL VISIT (OUTPATIENT)
Dept: INTERNAL MEDICINE CLINIC | Age: 71
End: 2021-03-18
Payer: MEDICARE

## 2021-03-18 DIAGNOSIS — F32.A ANXIETY AND DEPRESSION: Primary | ICD-10-CM

## 2021-03-18 DIAGNOSIS — F41.9 ANXIETY AND DEPRESSION: Primary | ICD-10-CM

## 2021-03-18 PROCEDURE — G9717 DOC PT DX DEP/BP F/U NT REQ: HCPCS | Performed by: SOCIAL WORKER

## 2021-03-18 PROCEDURE — G8427 DOCREV CUR MEDS BY ELIG CLIN: HCPCS | Performed by: SOCIAL WORKER

## 2021-03-18 PROCEDURE — 90834 PSYTX W PT 45 MINUTES: CPT | Performed by: SOCIAL WORKER

## 2021-03-18 NOTE — PROGRESS NOTES
Daryl Mendez being evaluated by a Virtual Visit (video visit) encounter to address concerns as mentioned above.  A caregiver was present when appropriate. Due to this being a TeleHealth encounter (During [address] emergency), evaluation of the following organ systems was limited: Vitals/Constitutional/EENT/Resp/CV/GI//MS/Neuro/Skin/Heme-Lymph-Imm.  Pursuant to the emergency declaration under the [address] [place] [address], [address] [place] [address] authority and the [employer] Act, this Virtual Visit was conducted with patient's (and/or legal guardian's) consent, to reduce the patient's risk of exposure to COVID-19 and provide necessary medical care.  The patient (and/or legal guardian) has also been advised to contact this office for worsening conditions or problems, and seek emergency medical treatment and/or call 911 if deemed necessary.           Services were provided through a video synchronous discussion virtually to substitute for in-person clinic visit. Patient was located at home and provider was located in office or at home.      An electronic signature was used to authenticate this note.   -- Simba Deluca LCSW   History of Present Illness: Coretta Rod is a 80 y.o. female who presents with symptoms of depression and anxiety     Duration of session: 26+ min     Mental Status exam:           Sensorium  oriented to time, place and person   Relations cooperative   Appearance:  na   Motor Behavior:  na   Speech:  normal pitch and normal volume   Thought Process: goal directed   Thought Content free of delusions, free of hallucinations and not internally preoccupied    Suicidal ideations none   Homicidal ideations none   Mood:  anxious and stable   Affect:  anxious   Memory recent  adequate   Memory remote:  adequate   Concentration:  adequate   Abstraction:  concrete   Insight:  fair   Reliability good   Judgment:  good          DIAGNOSIS AND IMPRESSION:        Axis I: Anxiety Disorder NOS  Axis II: No diagnosis  Axis III:           Past Medical History:   Diagnosis Date    Anxiety      Arthritis       knees    High cholesterol      Hypertension      Psychiatric disorder       anxiety      Axis IV: Problems with primary support group, Problems related to social environment and Other psychosocial or environmental problems  Axis V:  61-70 mild symptoms        Strengths: donis, caregiving, hard working  Trauma: n/a     Client presents via telephone covid for follow up encounter with this therapist, presents in anxious space, reports trembling and feeling shaky, difficulty concentrating and focusing at task and conversation. Reports wanting her anxiety medication but pcp is lowering dosage.                                      This note will not be viewable in [employer] for the following reason(s). This is a Psychotherapy Note.  ([address] [facility] [address] Providers Only)

## 2021-03-29 ENCOUNTER — IMMUNIZATION (OUTPATIENT)
Dept: INTERNAL MEDICINE CLINIC | Age: 71
End: 2021-03-29
Payer: MEDICARE

## 2021-03-29 DIAGNOSIS — Z23 ENCOUNTER FOR IMMUNIZATION: Primary | ICD-10-CM

## 2021-03-29 PROCEDURE — 0002A COVID-19, MRNA, LNP-S, PF, 30MCG/0.3ML DOSE(PFIZER): CPT | Performed by: FAMILY MEDICINE

## 2021-03-29 PROCEDURE — 91300 COVID-19, MRNA, LNP-S, PF, 30MCG/0.3ML DOSE(PFIZER): CPT | Performed by: FAMILY MEDICINE

## 2021-08-17 ENCOUNTER — TRANSCRIBE ORDER (OUTPATIENT)
Dept: SCHEDULING | Age: 71
End: 2021-08-17

## 2021-08-17 DIAGNOSIS — Z12.31 SCREENING MAMMOGRAM FOR HIGH-RISK PATIENT: Primary | ICD-10-CM

## 2021-08-25 ENCOUNTER — VIRTUAL VISIT (OUTPATIENT)
Dept: INTERNAL MEDICINE CLINIC | Age: 71
End: 2021-08-25
Payer: MEDICARE

## 2021-08-25 DIAGNOSIS — M25.561 CHRONIC PAIN OF BOTH KNEES: ICD-10-CM

## 2021-08-25 DIAGNOSIS — R63.4 UNINTENDED WEIGHT LOSS: ICD-10-CM

## 2021-08-25 DIAGNOSIS — M25.562 CHRONIC PAIN OF BOTH KNEES: ICD-10-CM

## 2021-08-25 DIAGNOSIS — G89.29 CHRONIC PAIN OF BOTH KNEES: ICD-10-CM

## 2021-08-25 DIAGNOSIS — M17.11 PRIMARY OSTEOARTHRITIS OF RIGHT KNEE: Primary | ICD-10-CM

## 2021-08-25 DIAGNOSIS — I10 ESSENTIAL HYPERTENSION WITH GOAL BLOOD PRESSURE LESS THAN 140/90: ICD-10-CM

## 2021-08-25 PROCEDURE — 99442 PR PHYS/QHP TELEPHONE EVALUATION 11-20 MIN: CPT | Performed by: NURSE PRACTITIONER

## 2021-08-25 RX ORDER — DICLOFENAC SODIUM 75 MG/1
75 TABLET, DELAYED RELEASE ORAL 2 TIMES DAILY
Qty: 60 TABLET | Refills: 3 | Status: SHIPPED | OUTPATIENT
Start: 2021-08-25 | End: 2021-10-22 | Stop reason: SDUPTHER

## 2021-08-25 RX ORDER — LIDOCAINE 50 MG/G
PATCH TOPICAL
Qty: 30 EACH | Refills: 11 | Status: SHIPPED | OUTPATIENT
Start: 2021-08-25 | End: 2022-07-19 | Stop reason: SDUPTHER

## 2021-08-25 RX ORDER — IPRATROPIUM BROMIDE 42 UG/1
2 SPRAY, METERED NASAL
Qty: 15 ML | Refills: 0 | Status: CANCELLED | OUTPATIENT
Start: 2021-08-25

## 2021-08-25 RX ORDER — MIRTAZAPINE 30 MG/1
TABLET, FILM COATED ORAL
COMMUNITY
End: 2022-10-20 | Stop reason: DRUGHIGH

## 2021-08-25 RX ORDER — FAMOTIDINE 40 MG/1
40 TABLET, FILM COATED ORAL DAILY
Qty: 90 TABLET | Refills: 3 | Status: SHIPPED | OUTPATIENT
Start: 2021-08-25 | End: 2021-10-22 | Stop reason: SDUPTHER

## 2021-08-25 NOTE — PROGRESS NOTES
Castro Hoyos is a 80 y.o. female evaluated via audio only technology on 8/25/2021. Consent: She and/or her health care decision maker is aware that she may receive a bill for this audio only encounter, depending on her insurance coverage, and has provided verbal consent to proceed: Yes    I communicated with the patient and/or health care decision maker about the nature and details of the following:  Assessment & Plan:   Diagnoses and all orders for this visit:    1. Primary osteoarthritis of right knee  -     lidocaine (LIDODERM) 5 %; Apply 1 patch to the affected area for 12 hours a day and remove for 12 hours a day. -     diclofenac EC (VOLTAREN) 75 mg EC tablet; Take 1 Tablet by mouth two (2) times a day. With food    2. Unintended weight loss  -     famotidine (PEPCID) 40 mg tablet; Take 1 Tablet by mouth daily. To Replace (omeprazole, pantoprazole, or similar)    3. Chronic pain of both knees  -     lidocaine (LIDODERM) 5 %; Apply 1 patch to the affected area for 12 hours a day and remove for 12 hours a day. -     diclofenac EC (VOLTAREN) 75 mg EC tablet; Take 1 Tablet by mouth two (2) times a day. With food    4. Essential hypertension with goal blood pressure less than 140/90      Pt instructed to call ORTHO for procedure scheduling, advised of switch from IBU to diclofenac to better protect kidneys and meant for daily use. Lidocaine patches also prescribed. Refilled pepcid for GI protection with h/o GERD. No changes to BP meds, will continue to monitor, reviewed how to do so, will reassess at f/u. Follow-up and Dispositions    · Return in about 4 weeks (around 9/22/2021) for OV- HTN, MWV, OA f/u. May db bk if needed. 12  Subjective:   Castro Hoyos is a 80 y.o. female who was seen for Follow-up (pt reports BP last sunday 184/77 ), Knee Pain (pt states ortho told her she has fragments in her knee and they will have cut them out, pt is requesting ibuprofen ), and Other (doxy. me [phone])      Pt presents to f/u chr knee OA and elevated BP. Had reading 184/77 Sunday, but yesterday's 140's/70's. Taking meds as prescribed. Denies side effects from medication. Feels good, but gaining wt again, now 170 lbs. Seen by [facility] and told he needs debridement done. Would like to proceed. Prior to Admission medications    Medication Sig Start Date End Date Taking? Authorizing Provider   mirtazapine (REMERON) 30 mg tablet Take  by mouth nightly. Yes Provider, Historical   famotidine (PEPCID) 40 mg tablet Take 1 Tablet by mouth daily. To Replace (omeprazole, pantoprazole, or similar) 8/25/21  Yes Kaylene Rodriguez NP   lidocaine (LIDODERM) 5 % Apply 1 patch to the affected area for 12 hours a day and remove for 12 hours a day. 8/25/21  Yes Kaylene Rodriguez NP   diclofenac EC (VOLTAREN) 75 mg EC tablet Take 1 Tablet by mouth two (2) times a day. With food 8/25/21  Yes Kaylene Rodriguez NP   escitalopram oxalate (LEXAPRO) 10 mg tablet 20 mg. 1/22/21  Yes Provider, Historical   LORazepam (ATIVAN) 0.5 mg tablet  1/23/21  Yes Provider, Historical   amLODIPine-atorvastatin (CADUET) 10-80 mg per tablet TAKE ONE TABLET BY MOUTH DAILY 1/26/21  Yes Kaylene Rodriguez NP   aspirin delayed-release 81 mg tablet Take 1 Tab by mouth daily. 4/27/15  Yes Parminder Figueroa MD   icosapent ethyL (VASCEPA) 1 gram capsule Take 2 Caps by mouth two (2) times a day. 2/25/21   Lemuel Sorensen NP   famotidine (PEPCID) 40 mg tablet Take 1 Tab by mouth daily. To Replace (omeprazole, pantoprazole, or similar)  Patient not taking: Reported on 8/25/2021 2/25/21 8/25/21  Lemuel Sorensen NP   ipratropium (ATROVENT) 42 mcg (0.06 %) nasal spray 2 Sprays by Both Nostrils route four (4) times daily as needed for Rhinitis. Patient not taking: Reported on 8/25/2021 1/28/21 8/25/21  Lemuel Sorensen NP   traZODone (DESYREL) 100 mg tablet TAKE ONE TABLET BY MOUTH ONCE NIGHTLY  Patient taking differently: 50 mg.  TAKE ONE TABLET BY MOUTH ONCE NIGHTLY 12/12/19   Jocelyne Corrales NP     Allergies   Allergen Reactions    Citalopram Other (comments)     Not effective    Prozac [Fluoxetine] Anxiety     Irritable, restless       Patient Active Problem List   Diagnosis Code    Primary osteoarthritis of right knee M17.11    Hypertriglyceridemia without hypercholesterolemia E78.1    Essential hypertension with goal blood pressure less than 140/90 I10    Cigarette nicotine dependence without complication F38.099    Anxiety and depression F41.9, F32.9    BMI 34.0-34.9,adult Z68.34    Former smoker Z87.891    S/P colonoscopic polypectomy Z98.890     Patient Active Problem List    Diagnosis Date Noted    S/P colonoscopic polypectomy 10/21/2019    Former smoker 07/30/2019    BMI 34.0-34.9,adult 01/29/2018    Anxiety and depression 05/15/2017    Cigarette nicotine dependence without complication 80/82/6544    Primary osteoarthritis of right knee 04/20/2016    Hypertriglyceridemia without hypercholesterolemia 04/20/2016    Essential hypertension with goal blood pressure less than 140/90 04/20/2016     Current Outpatient Medications   Medication Sig Dispense Refill    mirtazapine (REMERON) 30 mg tablet Take  by mouth nightly.  famotidine (PEPCID) 40 mg tablet Take 1 Tablet by mouth daily. To Replace (omeprazole, pantoprazole, or similar) 90 Tablet 3    lidocaine (LIDODERM) 5 % Apply 1 patch to the affected area for 12 hours a day and remove for 12 hours a day. 30 Each 11    diclofenac EC (VOLTAREN) 75 mg EC tablet Take 1 Tablet by mouth two (2) times a day. With food 60 Tablet 3    escitalopram oxalate (LEXAPRO) 10 mg tablet 20 mg.      LORazepam (ATIVAN) 0.5 mg tablet       amLODIPine-atorvastatin (CADUET) 10-80 mg per tablet TAKE ONE TABLET BY MOUTH DAILY 90 Tab 3    aspirin delayed-release 81 mg tablet Take 1 Tab by mouth daily. 30 Tab 11    icosapent ethyL (VASCEPA) 1 gram capsule Take 2 Caps by mouth two (2) times a day.  [facility] traZODone (DESYREL) 100 mg tablet TAKE ONE TABLET BY MOUTH ONCE NIGHTLY (Patient taking differently: 50 mg. TAKE ONE TABLET BY MOUTH ONCE NIGHTLY) 90 Tab 0     Allergies   Allergen Reactions    Citalopram Other (comments)     Not effective    Prozac [Fluoxetine] Anxiety     Irritable, restless     Past Medical History:   Diagnosis Date    Anxiety     Arthritis     knees    High cholesterol     Hypertension     Psychiatric disorder     anxiety     Past Surgical History:   Procedure Laterality Date    COLONOSCOPY N/A 10/15/2019    COLONOSCOPY performed by Anshul Castañeda MD at [facility] ENDOSCOPY     Family History   Problem Relation Age of Onset    Cancer Mother         throat    Cancer Father         lung    Hypertension Maternal Grandmother     Hypertension Maternal Grandfather     Hypertension Paternal Grandmother     Hypertension Paternal Grandfather      Social History     Tobacco Use    Smoking status: Former Smoker     Packs/day: 0.25     Types: Cigarettes     Quit date: 2019     Years since quittin.1    Smokeless tobacco: Never Used    Tobacco comment: pt states that she Quit 4 weeks ago    Substance Use Topics    Alcohol use: No       ROS    I affirm this is a Patient-Initiated Episode with a Patient who has not had a related appointment within my department in the past 7 days or scheduled within the next 24 hours.     Total Time: minutes: 11-20 minutes    Note: not billable if this call serves to triage the patient into an appointment for the relevant concern      Gio Ochoa NP

## 2021-08-25 NOTE — PATIENT INSTRUCTIONS
Knee Arthroscopy: Before Your Surgery  What is knee arthroscopy? Arthroscopy is a way to find problems and do surgery inside a joint without making a large cut (incision). Your doctor puts a lighted tube with a tiny camera and surgical tools through small incisions in your knee. The camera is called an arthroscope, or scope. In this surgery, your doctor may:  · Remove or repair a torn piece of cartilage or loose bone. · Replace a torn anterior cruciate ligament (ACL) with a piece of tissue. This repair is called a graft. · Smooth the rough surfaces of your joint. Most people go home on the day of the surgery or the next day. If you have a simple injury, it may take at least 6 weeks to recover. It may take longer if your doctor had to repair damaged tissue. You will need to limit activity while your knee heals. You may need to have physical therapy (rehab) to help your knee get stronger. If you have a desk job, you may be able to go back to work a few days after treatment of a simple injury. If you lift things or stand or walk a lot at work, it may be 2 to 6 weeks before you can go back. After surgery and rehab, you will probably have less pain. Your knee should be stronger. You should be able to use your knee and leg better. Some people have to avoid lifting heavy objects. Follow-up care is a key part of your treatment and safety. Be sure to make and go to all appointments, and call your doctor if you are having problems. It's also a good idea to know your test results and keep a list of the medicines you take. How do you prepare for surgery? Surgery can be stressful. This information will help you understand what you can expect. And it will help you safely prepare for surgery. Preparing for surgery    · Be sure you have someone to take you home.  Anesthesia and pain medicine will make it unsafe for you to drive or get home on your own.     · Understand exactly what surgery is planned, along with the risks, benefits, and other options.     · Tell your doctor ALL the medicines, vitamins, supplements, and herbal remedies you take. Some may increase the risk of problems during your surgery. Your doctor will tell you if you should stop taking any of them before the surgery and how soon to do it.     · If you take aspirin or some other blood thinner, ask your doctor if you should stop taking it before your surgery. Make sure that you understand exactly what your doctor wants you to do. These medicines increase the risk of bleeding.     · Make sure your doctor and the hospital have a copy of your advance directive. If you don't have one, you may want to prepare one. It lets others know your health care wishes. It's a good thing to have before any type of surgery or procedure. What happens on the day of surgery? · Follow the instructions exactly about when to stop eating and drinking. If you don't, your surgery may be canceled. If your doctor told you to take your medicines on the day of surgery, take them with only a sip of water.     · Take a bath or shower before you come in for your surgery. Do not apply lotions, perfumes, deodorants, or nail polish.     · Do not shave the surgical site yourself.     · Take off all jewelry and piercings. And take out contact lenses, if you wear them. At the hospital or surgery center   · Bring a picture ID.     · The area for surgery is often marked to make sure there are no errors.     · You will be kept comfortable and safe by your anesthesia provider. The anesthesia may make you sleep. Or it may just numb the area being worked on.     · The surgery will take about 1 to 2 hours. It depends on how much repair needs to be done to your knee. When should you call your doctor?    · You have questions or concerns.     · You don't understand how to prepare for your surgery.     · You become ill before the surgery (such as fever, flu, or a cold).     · You need to reschedule or have changed your mind about having the surgery. Where can you learn more? Go to http://www.gray.com/  Enter P723 in the search box to learn more about \"Knee Arthroscopy: Before Your Surgery. \"  Current as of: November 16, 2020               Content Version: 12.8  © 2006-2021 [employer]. Care instructions adapted under license by [employer] (which disclaims liability or warranty for this information). If you have questions about a medical condition or this instruction, always ask your healthcare professional. [address] any warranty or liability for your use of this information. Diclofenac (By mouth)   Diclofenac (dye-KLOE-fen-ak)  Treats pain. Also treats migraines. This medicine is an NSAID. Brand Name(s): Cambia, Cataflam, DermaSilkRx Anodynexa Evin, DermaSilkRx [employer], DermacinRx Ronn, Cumeira, NuDiclo TabPAK, PrevidolRx Analgesic Evin, PrevidolRx Plus Analgesic Evin, Voltaren, Zipsor, Zorvolex   There may be other brand names for this medicine. When This Medicine Should Not Be Used: This medicine is not right for everyone. Do not use it if you had an allergic reaction to diclofenac, aspirin, or another NSAID. How to Use This Medicine:   Capsule, Liquid, Tablet, Coated Tablet, Long Acting Tablet  · Your doctor will tell you how much medicine to use. Do not use more than directed. · This medicine should come with a Medication Guide. Ask your pharmacist for a copy if you do not have one. · Oral solution: Mix the packet contents with 1 to 2 ounces (30 to 60 mL) of water. Do not use any liquid other than water for mixing the medicine. Mix well and drink it immediately on an empty stomach. · Missed dose: Take a dose as soon as you remember. If it is almost time for your next dose, wait until then and take a regular dose. Do not take extra medicine to make up for a missed dose.   · Store the medicine in a closed container at room temperature, away from heat, moisture, and direct light. .  Drugs and Foods to Avoid:   Ask your doctor or pharmacist before using any other medicine, including over-the-counter medicines, vitamins, and herbal products. · Do not use any other NSAID unless your doctor says it is okay. Some other NSAIDs are aspirin, diflunisal, ibuprofen, naproxen, or salsalate. · Some medicines and foods can affect how diclofenac works. Tell your doctor if you are also using any of the following:  ¨ Cyclosporine, digoxin, lithium, methotrexate, pemetrexed  ¨ Blood pressure medicine  ¨ Blood thinner (such as warfarin)  ¨ Diuretic (water pill)  ¨ Medicine to treat depression  ¨ Steroid medicine  Warnings While Using This Medicine:   · Tell your doctor if you are pregnant or breastfeeding. Do not use this medicine during the later part of a pregnancy, unless your doctor tells you to. · Tell your doctor if you have kidney disease, liver disease, asthma, heart failure, high blood pressure, or heart or blood vessel problems, or a history of stomach ulcers or bleeding problems. Tell your doctor if you have phenylketonuria (PKU). Also tell your doctor if you drink alcohol. · This medicine may cause the following problems:  ¨ Increased risk of heart attack, heart failure, or stroke  ¨ Bleeding in your stomach or intestines  ¨ Liver problems  ¨ Serious skin reactions  · Your headaches may become worse if you use a headache medicine for 10 or more days per month. Write down how often your headaches occur and how often you use this medicine. · Your doctor will do lab tests at regular visits to check on the effects of this medicine. Keep all appointments. · Keep all medicine out of the reach of children. Never share your medicine with anyone.   Possible Side Effects While Using This Medicine:   Call your doctor right away if you notice any of these side effects:  · Allergic reaction: Itching or hives, swelling in your face or hands, swelling or tingling in your mouth or throat, chest tightness, trouble breathing  · Blistering, peeling, or red skin rash  · Bloody or black, tarry stools, severe stomach pain, vomiting blood or material that looks like coffee grounds  · Change in how much or how often you urinate  · Chest pain that may spread to your arms, jaw, back, or neck, trouble breathing, unusual sweating, faintness  · Dark urine or pale stools, nausea, vomiting, loss of appetite, stomach pain, yellow skin or eyes  · Numbness or weakness in your arm or leg, or on one side of your body, pain in your lower leg, sudden or severe headache, or problems with vision, speech, or walking  · Rapid weight gain, swelling in your hands, ankles, or feet  If you notice these less serious side effects, talk with your doctor:   · Constipation or diarrhea  · Mild headache  If you notice other side effects that you think are caused by this medicine, tell your doctor. Call your doctor for medical advice about side effects. You may report side effects to FDA at 8-753-FDA-0621  © 2017 [facility] [place] Information is for End User's use only and may not be sold, redistributed or otherwise used for commercial purposes. The above information is an  only. It is not intended as medical advice for individual conditions or treatments. Talk to your doctor, nurse or pharmacist before following any medical regimen to see if it is safe and effective for you.

## 2021-08-25 NOTE — PROGRESS NOTES
Chief Complaint   Patient presents with    Follow-up     pt reports BP last sunday 184/77     Knee Pain     pt states ortho told her she has fragments in her knee and they will have cut them out, pt is requesting ibuprofen     Other     doxy. me [phone]       1. Have you been to the ER, urgent care clinic since your last visit? Hospitalized since your last visit? No    2. Have you seen or consulted any other health care providers outside of the [address] [place] [address] since your last visit? Include any pap smears or colon screening.  No

## 2021-08-31 ENCOUNTER — HOSPITAL ENCOUNTER (OUTPATIENT)
Dept: MAMMOGRAPHY | Age: 71
Discharge: HOME | End: 2021-08-31
Attending: NURSE PRACTITIONER
Payer: MEDICARE

## 2021-08-31 DIAGNOSIS — Z12.31 SCREENING MAMMOGRAM FOR HIGH-RISK PATIENT: ICD-10-CM

## 2021-08-31 PROCEDURE — 77067 SCR MAMMO BI INCL CAD: CPT

## 2021-10-12 ENCOUNTER — OFFICE VISIT (OUTPATIENT)
Dept: INTERNAL MEDICINE CLINIC | Age: 71
End: 2021-10-12
Payer: MEDICARE

## 2021-10-12 VITALS
WEIGHT: 178 LBS | HEIGHT: 62 IN | DIASTOLIC BLOOD PRESSURE: 67 MMHG | HEART RATE: 77 BPM | BODY MASS INDEX: 32.76 KG/M2 | RESPIRATION RATE: 18 BRPM | TEMPERATURE: 97 F | OXYGEN SATURATION: 97 % | SYSTOLIC BLOOD PRESSURE: 145 MMHG

## 2021-10-12 DIAGNOSIS — M25.562 CHRONIC PAIN OF BOTH KNEES: ICD-10-CM

## 2021-10-12 DIAGNOSIS — F32.A ANXIETY AND DEPRESSION: ICD-10-CM

## 2021-10-12 DIAGNOSIS — G89.29 CHRONIC PAIN OF BOTH KNEES: ICD-10-CM

## 2021-10-12 DIAGNOSIS — F41.9 ANXIETY AND DEPRESSION: ICD-10-CM

## 2021-10-12 DIAGNOSIS — M25.561 CHRONIC PAIN OF BOTH KNEES: ICD-10-CM

## 2021-10-12 DIAGNOSIS — M17.11 PRIMARY OSTEOARTHRITIS OF RIGHT KNEE: ICD-10-CM

## 2021-10-12 DIAGNOSIS — F43.25 ADJUSTMENT DISORDER WITH MIXED DISTURBANCE OF EMOTIONS AND CONDUCT: Primary | ICD-10-CM

## 2021-10-12 DIAGNOSIS — E78.2 MIXED HYPERLIPIDEMIA: ICD-10-CM

## 2021-10-12 PROCEDURE — G8417 CALC BMI ABV UP PARAM F/U: HCPCS | Performed by: NURSE PRACTITIONER

## 2021-10-12 PROCEDURE — 3017F COLORECTAL CA SCREEN DOC REV: CPT | Performed by: NURSE PRACTITIONER

## 2021-10-12 PROCEDURE — 99214 OFFICE O/P EST MOD 30 MIN: CPT | Performed by: NURSE PRACTITIONER

## 2021-10-12 PROCEDURE — G8754 DIAS BP LESS 90: HCPCS | Performed by: NURSE PRACTITIONER

## 2021-10-12 PROCEDURE — G8753 SYS BP > OR = 140: HCPCS | Performed by: NURSE PRACTITIONER

## 2021-10-12 PROCEDURE — 1101F PT FALLS ASSESS-DOCD LE1/YR: CPT | Performed by: NURSE PRACTITIONER

## 2021-10-12 PROCEDURE — G9717 DOC PT DX DEP/BP F/U NT REQ: HCPCS | Performed by: NURSE PRACTITIONER

## 2021-10-12 PROCEDURE — G8399 PT W/DXA RESULTS DOCUMENT: HCPCS | Performed by: NURSE PRACTITIONER

## 2021-10-12 PROCEDURE — 1090F PRES/ABSN URINE INCON ASSESS: CPT | Performed by: NURSE PRACTITIONER

## 2021-10-12 PROCEDURE — G8536 NO DOC ELDER MAL SCRN: HCPCS | Performed by: NURSE PRACTITIONER

## 2021-10-12 PROCEDURE — G8427 DOCREV CUR MEDS BY ELIG CLIN: HCPCS | Performed by: NURSE PRACTITIONER

## 2021-10-12 PROCEDURE — G9899 SCRN MAM PERF RSLTS DOC: HCPCS | Performed by: NURSE PRACTITIONER

## 2021-10-12 RX ORDER — BUSPIRONE HYDROCHLORIDE 10 MG/1
10 TABLET ORAL 2 TIMES DAILY
Qty: 60 TABLET | Refills: 2 | Status: SHIPPED
Start: 2021-10-12 | End: 2021-11-22 | Stop reason: SINTOL

## 2021-10-12 NOTE — PROGRESS NOTES
Alan Hunter (: 1950) is a 80 y.o. female, established patient, here for evaluation of the following chief complaint(s): Annual Wellness Visit (medicare) and Follow-up (HTN, OA)       ASSESSMENT/PLAN:  Below is the assessment and plan developed based on review of pertinent history, physical exam, labs, studies, and medications. 1. Adjustment disorder with mixed disturbance of emotions and conduct  -     busPIRone (BUSPAR) 10 mg tablet; Take 1 Tablet by mouth two (2) times a day. Indications: repeated episodes of anxiety, Normal, Disp-60 Tablet, R-2  2. Primary osteoarthritis of right knee  -     RENAL FUNCTION PANEL; Future  3. Anxiety and depression  -     busPIRone (BUSPAR) 10 mg tablet; Take 1 Tablet by mouth two (2) times a day. Indications: repeated episodes of anxiety, Normal, Disp-60 Tablet, R-2  4. Mixed hyperlipidemia  -     LIPID PANEL; Future  5. Chronic pain of both knees      Return in about 4 weeks (around 2021) for VV-Buspar f/u. Pt asked to complete follow by next visit: follow low fat diet, follow low salt diet, routine labs ordered, have labs drawn prior to ROV, since pt NOT taking lexapro, switched to buspar 10 mg BID. Counseled on ensuring she is TAKING ATIVAN for anxiety, as she initially responded to YULY with score of 3, once questioned again after detailing symptoms she reports having daily as reason for taking ativan; higher score of 15 noted. SUBJECTIVE/OBJECTIVE:  HPI    Pt presents to f/u HTN and OA. Taking lidocaine and diclofenac. Denies side effects from medication. Feels good with meds, but nervous about tapering off of lorazepam. No acute complaints otherwise. BP Readings from Last 3 Encounters:   10/12/21 (!) 145/67   21 127/75   21 111/68     Having issue with continued anxiety. Weaning down off of ativan, but they are planning to stop this soon also. However, with hand shaking while taking 2 tabs once daily.   just had stroke and diagnosed with parkinson's recently. Feeling nervous, restless, and unable to do activities around home and help care for . Typically takes 2 since 1 tab alone doesn't help enough. YULY 2/7 10/12/2021 2/25/2021   Feeling nervous, anxious or on edge? - 2   Not being able to stop or control worrying? - 1   YULY-2 Subtotal - 3   Worrying too much about different things? - 3   Trouble relaxing? - 1   Being so restless that it is hard to sit still? - 1   Becoming easily annoyed or irritable? - 3   Feeling afraid as if something awful might happen? - 2   YULY-7 Total Score - 13   YULY-7 Result - Moderate Anxiety   If you checked off any problems, how difficult have these problems made it for you to do your work, take care of thinks at home, or get along with other people? - Somewhat difficult   Feeling nervous, anxious, or on edge 3 -   Not being able to stop or control worrying 2 -   Worrying too much about different things 3 -   Trouble relaxing 1 -   Being so restless that it is hard to sit still 3 -   Becoming easily annoyed or irritable 2 -   Feeling afraid as if something awful might happen 1 -   YULY-7 Total Score 15 -         Seen for eye health, told no glaucoma, but has cataracts in right eye.     Review of Systems  Constitutional: negative for fevers, chills, anorexia and weight loss  Respiratory:  negative for cough, hemoptysis, dyspnea, and wheezing  CV:   negative for chest pain, palpitations, and lower extremity edema  GI:   negative for nausea, vomiting, diarrhea, abdominal pain, and melena  Endo:               negative for polyuria,polydipsia,polyphagia, and heat intolerance  Genitourinary: negative for frequency, urgency, dysuria, retention, and hematuria  Integument:  negative for rash, ulcerations, and pruritus  Hematologic:  negative for easy bruising and bleeding  Musculoskel: negative for arthralgias, muscle weakness,and joint pain/swelling  Neurological:  negative for headaches, dizziness, vertigo,and memory/gait problems  Behavl/Psych: negative for feelings of anxiety, depression, suicide, and mood changes    Visit Vitals  BP (!) 145/67 (BP 1 Location: Right arm, BP Patient Position: Sitting, BP Cuff Size: Large adult)   Pulse 77   Temp 97 °F (36.1 °C) (Temporal)   Resp 18   Ht 5' 2\" (1.575 m)   Wt 178 lb (80.7 kg)   SpO2 97%   BMI 32.56 kg/m²       Wt Readings from Last 3 Encounters:   10/12/21 178 lb (80.7 kg)   02/25/21 166 lb (75.3 kg)   01/28/21 163 lb 3.2 oz (74 kg)         Physical Exam:   General appearance - alert, well appearing, and in no distress. Mental status - A/O x 4,normal mood and affect. Chest - CTA. Symmetric chest rise. No wheezing. No distress. Heart - Normal rate & rhythm. Normal S1 & S2. No MGR. Abdomen- Soft, round. Non-distended, NT. No pulsatile masses or hernias. Ext-  No pedal edema, clubbing, or cyanosis. Skin-Warm and dry. No hyperpigmentation, ulcerations, or suspicious lesions. Neuro - Normal speech, no focal findings or movement disorder. Normal strength, gait, and muscle tone. On this date 10/12/2021 I have spent 34 minutes reviewing previous notes, test results and face to face with the patient discussing the diagnosis and importance of compliance with the treatment plan as well as documenting on the day of the visit. An electronic signature was used to authenticate this note.   -- Lord Leonidas NP

## 2021-10-12 NOTE — PROGRESS NOTES
Pt is here for   Chief Complaint   Patient presents with   [facility] Annual Wellness Visit     medicare    Follow-up     HTN, OA     1. Have you been to the ER, urgent care clinic since your last visit? Hospitalized since your last visit? No    2. Have you seen or consulted any other health care providers outside of the [address] [place] [address] since your last visit? Include any pap smears or colon screening.  No      Pt states that she would like to discuss psyche medications

## 2021-10-12 NOTE — PATIENT INSTRUCTIONS
Buspirone (By mouth)   Buspirone ([id number])  Treats anxiety. Brand Name(s):   There may be other brand names for this medicine. When This Medicine Should Not Be Used: You should not use this medicine if you have had an allergic reaction to buspirone. How to Use This Medicine:   Tablet  · Your doctor will tell you how much of this medicine to take and how often. Do not take more medicine or take it more often than your doctor tells you to. · You may take this medicine with or without food, but take it the same way each time. · You may need to take this medicine for 1 or 2 weeks before you begin to feel better. If a dose is missed:   · If you miss a dose or forget to take your medicine, take it as soon as you can. If it is almost time for your next dose, wait until then to take the medicine and skip the missed dose. · Do not use extra medicine to make up for a missed dose. How to Store and Dispose of This Medicine:   · Store the medicine at room temperature, away from heat, moisture, and direct light. · Keep all medicine out of the reach of children and never share your medicine with anyone. Drugs and Foods to Avoid:   Ask your doctor or pharmacist before using any other medicine, including over-the-counter medicines, vitamins, and herbal products. · You should not use buspirone when you are also using an MAO inhibitor (such as Eldepryl®, Marplan®, Nardil®, Parnate®). · Do not eat grapefruit, drink grapefruit juice, or drink alcohol while you are using buspirone. · Make sure your doctor knows if you are also using cimetidine (Darwyn Yudelka), dexamethasone (Decadron®), diltiazem (Channing Jake), erythromycin (Erythro-Tab®), itraconazole (Sporanox®), nefazodone (Serzone®), rifampin (Rifadin®, Rifamate®, Rifater®), verapamil (Lai Khanh), or medicine for seizures (such as Dilantin®, Luminal®, Tegretol®).   · Make sure your doctor knows if you are using any medicines that make you sleepy (such as sleeping pills, cold and allergy medicine, narcotic pain relievers, or sedatives). Warnings While Using This Medicine:   · Make sure your doctor knows if you are pregnant or breastfeeding, or if you have kidney or liver disease. · Do not stop using this medicine suddenly without asking your doctor. You may need to slowly decrease your dose before stopping it completely. · This medicine may make you dizzy or drowsy. Avoid driving, using machines, or doing anything else that could be dangerous if you are not alert. Possible Side Effects While Using This Medicine:   Call your doctor right away if you notice any of these side effects:  · Fast or pounding heartbeat  · Numbness or tingling feeling  · Tremors or shaking  If you notice these less serious side effects, talk with your doctor:   · Drowsiness or weakness  · Dry mouth  · Feeling restless or nervous, trouble sleeping  · Headache  · Nausea, constipation, upset stomach  If you notice other side effects that you think are caused by this medicine, tell your doctor. Call your doctor for medical advice about side effects. You may report side effects to FDA at 0-186-FDA-0631  © 2017 [facility] Information is for End User's use only and may not be sold, redistributed or otherwise used for commercial purposes. The above information is an  only. It is not intended as medical advice for individual conditions or treatments. Talk to your doctor, nurse or pharmacist before following any medical regimen to see if it is safe and effective for you. Learning About Benefits From Quitting Smoking  How does quitting smoking make you healthier? If you're thinking about quitting smoking, you may have a few reasons to be smoke-free. Your health may be one of them. · When you quit smoking, you lower your risks for cancer, lung disease, heart attack, stroke, blood vessel disease, and blindness from macular degeneration.   · When you're smoke-free, you get sick less often, and you heal faster. You are less likely to get colds, flu, bronchitis, and pneumonia. · As a nonsmoker, you may find that your mood is better and you are less stressed. When and how will you feel healthier? Quitting has real health benefits that start from day 1 of being smoke-free. And the longer you stay smoke-free, the healthier you get and the better you feel. The first hours  · After just 20 minutes, your blood pressure and heart rate go down. That means there's less stress on your heart and blood vessels. · Within 12 hours, the level of carbon monoxide in your blood drops back to normal. That makes room for more oxygen. With more oxygen in your body, you may notice that you have more energy than when you smoked. After 2 weeks  · Your lungs start to work better. · Your risk of heart attack starts to drop. After 1 month  · When your lungs are clear, you cough less and breathe deeper, so it's easier to be active. · Your sense of taste and smell return. That means you can enjoy food more than you have since you started smoking. Over the years  · Over the years, your risks of heart disease, heart attack, and stroke are lower. · After 10 years, your risk of dying from lung cancer is cut by about half. And your risk for many other types of cancer is lower too. How would quitting help others in your life? When you quit smoking, you improve the health of everyone who now breathes in your smoke. · Their heart, lung, and cancer risks drop, much like yours. · They are sick less. For babies and small children, living smoke-free means they're less likely to have ear infections, pneumonia, and bronchitis. · If you're a woman who is or will be pregnant someday, quitting smoking means a healthier . · Children who are close to you are less likely to become adult smokers. Where can you learn more?   Go to http://www.gray.com/  Enter O319 in the search box to learn more about \"Learning About Benefits From Quitting Smoking. \"  Current as of: February 11, 2021               Content Version: 13.0  © 6292-8707 Healthwise, Incorporated. Care instructions adapted under license by [employer] (which disclaims liability or warranty for this information). If you have questions about a medical condition or this instruction, always ask your healthcare professional. [place] [address] any warranty or liability for your use of this information.

## 2021-10-22 DIAGNOSIS — M17.11 PRIMARY OSTEOARTHRITIS OF RIGHT KNEE: ICD-10-CM

## 2021-10-22 DIAGNOSIS — M25.562 CHRONIC PAIN OF BOTH KNEES: ICD-10-CM

## 2021-10-22 DIAGNOSIS — R63.4 UNINTENDED WEIGHT LOSS: ICD-10-CM

## 2021-10-22 DIAGNOSIS — G89.29 CHRONIC PAIN OF BOTH KNEES: ICD-10-CM

## 2021-10-22 DIAGNOSIS — M25.561 CHRONIC PAIN OF BOTH KNEES: ICD-10-CM

## 2021-10-22 DIAGNOSIS — E78.2 MIXED HYPERLIPIDEMIA: ICD-10-CM

## 2021-10-22 RX ORDER — DICLOFENAC SODIUM 75 MG/1
75 TABLET, DELAYED RELEASE ORAL 2 TIMES DAILY
Qty: 180 TABLET | Refills: 3 | Status: SHIPPED | OUTPATIENT
Start: 2021-10-22 | End: 2022-06-06

## 2021-10-22 RX ORDER — FAMOTIDINE 40 MG/1
40 TABLET, FILM COATED ORAL DAILY
Qty: 90 TABLET | Refills: 3 | Status: SHIPPED | OUTPATIENT
Start: 2021-10-22 | End: 2022-08-19

## 2021-10-22 RX ORDER — AMLODIPINE BESYLATE AND ATORVASTATIN CALCIUM 10; 80 MG/1; MG/1
1 TABLET, FILM COATED ORAL DAILY
Qty: 90 TABLET | Refills: 3 | Status: SHIPPED | OUTPATIENT
Start: 2021-10-22 | End: 2022-08-07

## 2021-11-22 ENCOUNTER — VIRTUAL VISIT (OUTPATIENT)
Dept: INTERNAL MEDICINE CLINIC | Age: 71
End: 2021-11-22
Payer: MEDICARE

## 2021-11-22 DIAGNOSIS — R11.10 VOMITING, INTRACTABILITY OF VOMITING NOT SPECIFIED, PRESENCE OF NAUSEA NOT SPECIFIED, UNSPECIFIED VOMITING TYPE: ICD-10-CM

## 2021-11-22 DIAGNOSIS — F41.9 ANXIETY AND DEPRESSION: Primary | ICD-10-CM

## 2021-11-22 DIAGNOSIS — R42 DIZZINESS: ICD-10-CM

## 2021-11-22 DIAGNOSIS — T50.905A MEDICATION SIDE EFFECT, INITIAL ENCOUNTER: ICD-10-CM

## 2021-11-22 DIAGNOSIS — F32.A ANXIETY AND DEPRESSION: Primary | ICD-10-CM

## 2021-11-22 PROCEDURE — 99442 PR PHYS/QHP TELEPHONE EVALUATION 11-20 MIN: CPT | Performed by: NURSE PRACTITIONER

## 2021-11-22 NOTE — PATIENT INSTRUCTIONS
Planning to Stop Taking Benzodiazepine: Care Instructions  Your Care Instructions  Doctors prescribe benzodiazepine medicines to treat anxiety, muscle spasms, sleep problems, and seizures. You may know them by their generic and brand names. These include:  · Alprazolam (Xanax). · Diazepam (Valium). · Lorazepam (Ativan). When you plan to stop taking one of these medicines, make sure to work with your doctor. Don't stop taking it all at once. Stopping all at once can make you sick. And don't try to do it on your own. Follow your doctor's plan for slowly lowering your dose. When you start to lower your dose, you may have some symptoms of withdrawal. Withdrawal is an uncomfortable physical or mental change. It happens when your body stops getting a medicine that it is used to getting. Follow your doctor's plan to help your body adjust more easily. When you start to take less medicine, you may feel some changes. They may start right away or after a few days. You might feel anxious or depressed. You may have an upset stomach and trouble sleeping. These changes are common. They may last a couple of weeks or longer, but they will get better. If you have trouble dealing with them, your doctor can help. Follow-up care is a key part of your treatment and safety. Be sure to make and go to all appointments, and call your doctor if you are having problems. It's also a good idea to know your test results and keep a list of the medicines you take. How can you care for yourself at home? · Follow your doctor's plan for slowly lowering your dose. · Be safe with medicines. Take your medicines exactly as prescribed. Call your doctor if you think you are having a problem with your medicine. · Don't drink alcohol. · Don't take over-the-counter sleep medicines unless you talk to your doctor first.  · Think about seeing a counselor for help dealing with stress and anxiety. Your doctor can recommend one.   · Know that some discomfort is common. It will get better. When should you call for help? Call 911 anytime you think you may need emergency care. For example, call if:    · You have a seizure.     · You feel you cannot stop from hurting yourself or someone else. Call your doctor now or seek immediate medical care if:    · You have serious withdrawal symptoms such as confusion, hallucinations, or severe trembling. Watch closely for changes in your health, and be sure to contact your doctor if:    · You do not get better as expected.     · You have been feeling sad, depressed, or hopeless or have lost interest in things that you usually enjoy. Where can you learn more? Go to http://www.gray.com/  Enter X857 in the search box to learn more about \"Planning to Stop Taking Benzodiazepine: Care Instructions. \"  Current as of: February 11, 2021               Content Version: 13.0  © 9334-8850 Healthwise, Incorporated. Care instructions adapted under license by [employer] (which disclaims liability or warranty for this information). If you have questions about a medical condition or this instruction, always ask your healthcare professional. [address] any warranty or liability for your use of this information.

## 2021-11-22 NOTE — PROGRESS NOTES
Albert Florez is a 80 y.o. female evaluated via audio only technology on 11/22/2021. Consent: She and/or her health care decision maker is aware that she may receive a bill for this audio only encounter, depending on her insurance coverage, and has provided verbal consent to proceed: Yes    I communicated with the patient and/or health care decision maker about the nature and details of the following:  Assessment & Plan:   Diagnoses and all orders for this visit:    1. Anxiety and depression    2. Medication side effect, initial encounter    3. Dizziness    4. Vomiting, intractability of vomiting not specified, presence of nausea not specified, unspecified vomiting type      Pt instructed to continue current regimen, but request DAILY anxiety med (NOT Buspar, Celexa, or Prozac as she has documented reaction or ineffectiveness with their use). Again discussed with pt that WE, as a medical community, no longer treat anxiety with BENZO. As she requested a new psych, advised she is unlikely to find a provider willing to continue her request for 1mg ativan BID as she took for years. Follow-up and Dispositions    · Return in about 2 months (around 1/28/2022) for OV- [facility] [address]  Subjective:   Albert Florez is a 80 y.o. female who was seen for Follow-up (for buspar PHONE CALL [phone])      Pt presents to f/u YULY. Taking Buspar BID, but had vomiting and dizziness following first dose. Feels anxiety is otherwise unchanged. Continue Ativan BID until seen by psych. Has home BP cuff now, today's reading 130/70's. Prior to Admission medications    Medication Sig Start Date End Date Taking? Authorizing Provider   diclofenac EC (VOLTAREN) 75 mg EC tablet Take 1 Tablet by mouth two (2) times a day. With food 10/22/21  Yes Jj Rodriguez, NP   famotidine (PEPCID) 40 mg tablet Take 1 Tablet by mouth daily.  To Replace (omeprazole, pantoprazole, or similar) 10/22/21  Yes Poornima Willoughby Olivia, NP   amLODIPine-atorvastatin (CADUET) 10-80 mg per tablet Take 1 Tablet by mouth daily. 10/22/21  Yes Danica Rodriguez NP   mirtazapine (REMERON) 30 mg tablet Take  by mouth nightly. Yes Provider, Historical   lidocaine (LIDODERM) 5 % Apply 1 patch to the affected area for 12 hours a day and remove for 12 hours a day. 8/25/21  Yes Danica Rodriguez NP   icosapent ethyL (VASCEPA) 1 gram capsule Take 2 Caps by mouth two (2) times a day. 2/25/21  Yes Danica Rodriguez NP   LORazepam (ATIVAN) 0.5 mg tablet  1/23/21  Yes Provider, Historical   aspirin delayed-release 81 mg tablet Take 1 Tab by mouth daily. 4/27/15  Yes Pavithra Figueroa MD   busPIRone (BUSPAR) 10 mg tablet Take 1 Tablet by mouth two (2) times a day. Indications: repeated episodes of anxiety 10/12/21 11/22/21  Earlene Davidson NP     Allergies   Allergen Reactions    Citalopram Other (comments)     Not effective    Prozac [Fluoxetine] Anxiety     Irritable, restless       Patient Active Problem List   Diagnosis Code    Primary osteoarthritis of right knee M17.11    Hypertriglyceridemia without hypercholesterolemia E78.1    Essential hypertension with goal blood pressure less than 140/90 I10    Cigarette nicotine dependence without complication U40.996    Anxiety and depression F41.9, F32. A    BMI 34.0-34.9,adult Z68.34    Former smoker Z87.891    S/P colonoscopic polypectomy Z98.890     Patient Active Problem List    Diagnosis Date Noted    S/P colonoscopic polypectomy 10/21/2019    Former smoker 07/30/2019    BMI 34.0-34.9,adult 01/29/2018    Anxiety and depression 05/15/2017    Cigarette nicotine dependence without complication 71/91/8685    Primary osteoarthritis of right knee 04/20/2016    Hypertriglyceridemia without hypercholesterolemia 04/20/2016    Essential hypertension with goal blood pressure less than 140/90 04/20/2016     Current Outpatient Medications   Medication Sig Dispense Refill    diclofenac EC (VOLTAREN) 75 mg EC tablet Take 1 Tablet by mouth two (2) times a day. With food 180 Tablet 3    famotidine (PEPCID) 40 mg tablet Take 1 Tablet by mouth daily. To Replace (omeprazole, pantoprazole, or similar) 90 Tablet 3    amLODIPine-atorvastatin (CADUET) 10-80 mg per tablet Take 1 Tablet by mouth daily. 90 Tablet 3    mirtazapine (REMERON) 30 mg tablet Take  by mouth nightly.  lidocaine (LIDODERM) 5 % Apply 1 patch to the affected area for 12 hours a day and remove for 12 hours a day. 30 Each 11    icosapent ethyL (VASCEPA) 1 gram capsule Take 2 Caps by mouth two (2) times a day. 360 Cap 3    LORazepam (ATIVAN) 0.5 mg tablet       aspirin delayed-release 81 mg tablet Take 1 Tab by mouth daily. 30 Tab 11     Allergies   Allergen Reactions    Citalopram Other (comments)     Not effective    Prozac [Fluoxetine] Anxiety     Irritable, restless     Past Medical History:   Diagnosis Date    Anxiety     Arthritis     knees    High cholesterol     Hypertension     Psychiatric disorder     anxiety     Past Surgical History:   Procedure Laterality Date    COLONOSCOPY N/A 10/15/2019    COLONOSCOPY performed by Jul[facility]eta Paz [facility]     Family History   Problem Relation Age of Onset    Cancer Mother         throat    Cancer Father         lung    Hypertension Maternal Grandmother     Hypertension Maternal Grandfather     Hypertension Paternal Grandmother     Hypertension Paternal Grandfather      Social History     Tobacco Use    Smoking status: Former Smoker     Packs/day: 0.25     Types: Cigarettes     Quit date: 2019     Years since quittin.3    Smokeless tobacco: Never Used    Tobacco comment: pt states that she Quit 4 weeks ago    Substance Use Topics    Alcohol use: No       ROS    I affirm this is a Patient-Initiated Episode with a Patient who has not had a related appointment within my department in the past 7 days or scheduled within the next 24 hours.     Total Time: minutes: 11-20 minutes    Note: not billable if this call serves to triage the patient into an appointment for the relevant concern      Hi Blood NP

## 2021-11-22 NOTE — PROGRESS NOTES
Pt Is here for   Chief Complaint   Patient presents with    Follow-up     for buspar PHONE CALL [phone]     Denies pain at this time    1. Have you been to the ER, urgent care clinic since your last visit? Hospitalized since your last visit? No    2. Have you seen or consulted any other health care providers outside of the [address] [place] [address] since your last visit? Include any pap smears or colon screening.  No

## 2022-01-24 ENCOUNTER — VIRTUAL VISIT (OUTPATIENT)
Dept: INTERNAL MEDICINE CLINIC | Age: 72
End: 2022-01-24
Payer: MEDICARE

## 2022-01-24 DIAGNOSIS — Z71.85 IMMUNIZATION COUNSELING: ICD-10-CM

## 2022-01-24 DIAGNOSIS — Z98.890 S/P COLONOSCOPIC POLYPECTOMY: ICD-10-CM

## 2022-01-24 DIAGNOSIS — F32.A ANXIETY AND DEPRESSION: ICD-10-CM

## 2022-01-24 DIAGNOSIS — Z71.89 ADVANCE CARE PLANNING: ICD-10-CM

## 2022-01-24 DIAGNOSIS — F41.9 ANXIETY AND DEPRESSION: ICD-10-CM

## 2022-01-24 DIAGNOSIS — Z00.00 MEDICARE ANNUAL WELLNESS VISIT, SUBSEQUENT: Primary | ICD-10-CM

## 2022-01-24 PROCEDURE — G0439 PPPS, SUBSEQ VISIT: HCPCS | Performed by: NURSE PRACTITIONER

## 2022-01-24 PROCEDURE — G8399 PT W/DXA RESULTS DOCUMENT: HCPCS | Performed by: NURSE PRACTITIONER

## 2022-01-24 PROCEDURE — G9899 SCRN MAM PERF RSLTS DOC: HCPCS | Performed by: NURSE PRACTITIONER

## 2022-01-24 PROCEDURE — 3017F COLORECTAL CA SCREEN DOC REV: CPT | Performed by: NURSE PRACTITIONER

## 2022-01-24 PROCEDURE — G8427 DOCREV CUR MEDS BY ELIG CLIN: HCPCS | Performed by: NURSE PRACTITIONER

## 2022-01-24 PROCEDURE — G8756 NO BP MEASURE DOC: HCPCS | Performed by: NURSE PRACTITIONER

## 2022-01-24 PROCEDURE — 1101F PT FALLS ASSESS-DOCD LE1/YR: CPT | Performed by: NURSE PRACTITIONER

## 2022-01-24 PROCEDURE — G9717 DOC PT DX DEP/BP F/U NT REQ: HCPCS | Performed by: NURSE PRACTITIONER

## 2022-01-24 RX ORDER — ZOSTER VACCINE RECOMBINANT, ADJUVANTED 50 MCG/0.5
KIT INTRAMUSCULAR
Qty: 0.5 ML | Refills: 1 | Status: SHIPPED | OUTPATIENT
Start: 2022-01-24 | End: 2022-10-20 | Stop reason: ALTCHOICE

## 2022-01-24 RX ORDER — ESCITALOPRAM OXALATE 20 MG/1
TABLET ORAL
COMMUNITY
Start: 2021-12-14 | End: 2022-10-20 | Stop reason: ALTCHOICE

## 2022-01-24 NOTE — ACP (ADVANCE CARE PLANNING)
Advanced care planning- discussed Advance directive, Medical POA, and life sustaining options. Advised of free virtual or in-person visit for advance care planning paperwork completion with ACP specialist. Referreal sent. Advance Care Planning (ACP) Provider Conversation Snapshot    Date of ACP Conversation: 01/24/22  Persons included in Conversation:  Patient/family  Length of ACP Conversation in minutes:  5-10 minutes    Authorized Decision Maker (if patient is incapable of making informed decisions): This person is:   Healthcare Agent/Medical Power of  under Advance Directive        For Patients with Decision Making Capacity:   Intubation, CPR, use of IVF/Nutrition, Tube Feedings, and organ donation options reviewed briefly    Conversation Outcomes / Follow-Up Plan:   Recommended completion of Advance Directive form after review of ACP materials and conversation with prospective healthcare agent     Referral made for ACP follow-up assistance to:  ACP facilitator/specialist    ====Advance Care Planning Invitation====    Patient was invited to begin or continue Advance Care Planning on this date and reviewed ACP materials in the office OR discussed in detail during virtual visit. Recommended appointment with a [facility]  facilitator for ACP conversation regarding advance directives. [x] Yes  [] No  Referral sent to [facility] ACP team member or Coordinator for follow-up    [] Yes  [x] No  Patient scheduled an appointment.        Site of Referral: [place] [address]

## 2022-01-24 NOTE — PROGRESS NOTES
Identified pt with two pt identifiers(name and ). Reviewed record in preparation for visit and have obtained necessary documentation. Chief Complaint   Patient presents with    Annual Wellness Visit        Health Maintenance Due   Topic    Shingrix Vaccine Age 49> (1 of 2)    Colorectal Cancer Screening Combo     Medicare Yearly Exam     COVID-19 Vaccine (3 - Booster for Pfizer series)    Flu Vaccine (1)      There were no vitals taken for this visit. Pain Scale: /10    Coordination of Care Questionnaire:  :   1. Have you been to the ER, urgent care clinic since your last visit? Hospitalized since your last visit? No    2. Have you seen or consulted any other health care providers outside of the [address] [place] [address] since your last visit? Include any pap smears or colon screening.  No

## 2022-01-24 NOTE — PATIENT INSTRUCTIONS
Advance Directives: Care Instructions  Overview  An advance directive is a legal way to state your wishes at the end of your life. It tells your family and your doctor what to do if you can't say what you want. There are two main types of advance directives. You can change them any time your wishes change. Living will. This form tells your family and your doctor your wishes about life support and other treatment. The form is also called a declaration. Medical power of . This form lets you name a person to make treatment decisions for you when you can't speak for yourself. This person is called a health care agent (health care proxy, health care surrogate). The form is also called a durable power of  for health care. If you do not have an advance directive, decisions about your medical care may be made by a family member, or by a doctor or a  who doesn't know you. It may help to think of an advance directive as a gift to the people who care for you. If you have one, they won't have to make tough decisions by themselves. Follow-up care is a key part of your treatment and safety. Be sure to make and go to all appointments, and call your doctor if you are having problems. It's also a good idea to know your test results and keep a list of the medicines you take. What should you include in an advance directive? Many states have a unique advance directive form. (It may ask you to address specific issues.) Or you might use a universal form that's approved by many states. If your form doesn't tell you what to address, it may be hard to know what to include in your advance directive. Use the questions below to help you get started. · Who do you want to make decisions about your medical care if you are not able to? · What life-support measures do you want if you have a serious illness that gets worse over time or can't be cured? · What are you most afraid of that might happen?  (Maybe you're afraid of having pain, losing your independence, or being kept alive by machines.)  · Where would you prefer to die? (Your home? A hospital? A nursing home?)  · Do you want to donate your organs when you die? · Do you want certain [employer] practices performed before you die? When should you call for help? Be sure to contact your doctor if you have any questions. Where can you learn more? Go to http://www.gray.com/  Enter R264 in the search box to learn more about \"Advance Directives: Care Instructions. \"  Current as of: March 17, 2021               Content Version: 13.0  © 0230-0375 [employer]. Care instructions adapted under license by [email or web address] (which disclaims liability or warranty for this information). If you have questions about a medical condition or this instruction, always ask your healthcare professional. [address] any warranty or liability for your use of this information.

## 2022-01-24 NOTE — PROGRESS NOTES
Miriam Wallace is a 71 y.o. female evaluated via audio only technology on 1/24/2022. Consent: She and/or her health care decision maker is aware that she may receive a bill for this audio only encounter, depending on her insurance coverage, and has provided verbal consent to proceed: Yes    I communicated with the patient and/or health care decision maker about the nature and details of the following:  Assessment & Plan:   Diagnoses and all orders for this visit:    1. Medicare annual wellness visit, subsequent    2. Advance care planning    3. S/P colonoscopic polypectomy    4. Anxiety and depression    5. Immunization counseling    Other orders  -     varicella-zoster recombinant, PF, (Shingrix, PF,) 50 mcg/0.5 mL susr injection; Pharmacy to administer 0.5 mL. Pt instructed to get shingles vacc. Inquire about booster shot admin. Deferred repeat PNA vacc until next year when 5 yrs out from last PCV-23 admin for h/o recurrent PNA. Suggestions on alternate options for volunteering if current plans fail. Follow-up and Dispositions    · Return in about 3 months (around 4/24/2022) for OV- repeat CHOL, volunteer/vaccination fu.         12  Subjective:   Miriam Wallace is a 71 y.o. female who was seen for Annual Wellness Visit      Pt presents for subsequent MWV. Reports doing well otherwise. Feels she needs to more lately, help with elderly or children. Noticed she is bored and 4 walls are caving in. Awaiting family to allow her to sit with their elderly. Prior to Admission medications    Medication Sig Start Date End Date Taking? Authorizing Provider   escitalopram oxalate (LEXAPRO) 20 mg tablet  12/14/21  Yes Provider, Historical   varicella-zoster recombinant, PF, (Shingrix, PF,) 50 mcg/0.5 mL susr injection Pharmacy to administer 0.5 mL. 1/24/22  Yes Vianney Rodriguez NP   diclofenac EC (VOLTAREN) 75 mg EC tablet Take 1 Tablet by mouth two (2) times a day.  With food 10/22/21  Yes Carmen East NP famotidine (PEPCID) 40 mg tablet Take 1 Tablet by mouth daily. To Replace (omeprazole, pantoprazole, or similar) 10/22/21  Yes Kaylene Rodriguez NP   amLODIPine-atorvastatin (CADUET) 10-80 mg per tablet Take 1 Tablet by mouth daily. 10/22/21  Yes Kaylene Rodriguez NP   mirtazapine (REMERON) 30 mg tablet Take  by mouth nightly. Yes Provider, Historical   lidocaine (LIDODERM) 5 % Apply 1 patch to the affected area for 12 hours a day and remove for 12 hours a day. 8/25/21  Yes Kaylene Rodriguez NP   icosapent ethyL (VASCEPA) 1 gram capsule Take 2 Caps by mouth two (2) times a day. 2/25/21  Yes Kaylene Rodriguez NP   LORazepam (ATIVAN) 0.5 mg tablet  1/23/21  Yes Provider, Historical   aspirin delayed-release 81 mg tablet Take 1 Tab by mouth daily. 4/27/15  Yes Channing Figueroa MD     Allergies   Allergen Reactions    Citalopram Other (comments)     Not effective    Prozac [Fluoxetine] Anxiety     Irritable, restless       Patient Active Problem List   Diagnosis Code    Primary osteoarthritis of right knee M17.11    Hypertriglyceridemia without hypercholesterolemia E78.1    Essential hypertension with goal blood pressure less than 140/90 I10    Cigarette nicotine dependence without complication W86.962    Anxiety and depression F41.9, F32. A    BMI 34.0-34.9,adult Z68.34    Former smoker Z87.891    S/P colonoscopic polypectomy Z98.890     Patient Active Problem List    Diagnosis Date Noted    S/P colonoscopic polypectomy 10/21/2019    Former smoker 07/30/2019    BMI 34.0-34.9,adult 01/29/2018    Anxiety and depression 05/15/2017    Cigarette nicotine dependence without complication 46/95/6863    Primary osteoarthritis of right knee 04/20/2016    Hypertriglyceridemia without hypercholesterolemia 04/20/2016    Essential hypertension with goal blood pressure less than 140/90 04/20/2016     Current Outpatient Medications   Medication Sig Dispense Refill    escitalopram oxalate (LEXAPRO) 20 mg tablet       varicella-zoster recombinant, PF, (Shingrix, PF,) 50 mcg/0.5 mL susr injection Pharmacy to administer 0.5 mL. 0.5 mL 1    diclofenac EC (VOLTAREN) 75 mg EC tablet Take 1 Tablet by mouth two (2) times a day. With food 180 Tablet 3    famotidine (PEPCID) 40 mg tablet Take 1 Tablet by mouth daily. To Replace (omeprazole, pantoprazole, or similar) 90 Tablet 3    amLODIPine-atorvastatin (CADUET) 10-80 mg per tablet Take 1 Tablet by mouth daily. 90 Tablet 3    mirtazapine (REMERON) 30 mg tablet Take  by mouth nightly.  lidocaine (LIDODERM) 5 % Apply 1 patch to the affected area for 12 hours a day and remove for 12 hours a day. 30 Each 11    icosapent ethyL (VASCEPA) 1 gram capsule Take 2 Caps by mouth two (2) times a day. 360 Cap 3    LORazepam (ATIVAN) 0.5 mg tablet       aspirin delayed-release 81 mg tablet Take 1 Tab by mouth daily.  30 Tab 11     Allergies   Allergen Reactions    Citalopram Other (comments)     Not effective    Prozac [Fluoxetine] Anxiety     Irritable, restless     Past Medical History:   Diagnosis Date    Anxiety     Arthritis     knees    High cholesterol     Hypertension     Psychiatric disorder     anxiety     Past Surgical History:   Procedure Laterality Date    COLONOSCOPY N/A 10/15/2019    COLONOSCOPY performed by Chandni Jean-Baptiste MD at [facility] ENDOSCOPY     Family History   Problem Relation Age of Onset   [place] Cancer Mother         throat    Cancer Father         lung    Hypertension Maternal Grandmother     Hypertension Maternal Grandfather     Hypertension Paternal Grandmother     Hypertension Paternal Grandfather      Social History     Tobacco Use    Smoking status: Current Some Day Smoker     Packs/day: 0.25     Types: Cigarettes     Last attempt to quit: 2019     Years since quittin.5    Smokeless tobacco: Never Used   Substance Use Topics    Alcohol use: No       ROS     Assessment of cognitive impairment: Alert and oriented x 4    Depression Screen:   3 most recent PHQ Screens 1/24/2022   PHQ Not Done -   Little interest or pleasure in doing things Not at all   Feeling down, depressed, irritable, or hopeless Not at all   Total Score PHQ 2 0   Trouble falling or staying asleep, or sleeping too much -   Feeling tired or having little energy -   Poor appetite, weight loss, or overeating -   Feeling bad about yourself - or that you are a failure or have let yourself or your family down -   Trouble concentrating on things such as school, work, reading, or watching TV -   Moving or speaking so slowly that other people could have noticed; or the opposite being so fidgety that others notice -   Thoughts of being better off dead, or hurting yourself in some way -   PHQ 9 Score -   How difficult have these problems made it for you to do your work, take care of your home and get along with others -       Fall Risk Assessment:    Fall Risk Assessment, last 12 mths 1/24/2022   Able to walk? Yes   Fall in past 12 months? 0   Do you feel unsteady? 0   Are you worried about falling 0       Abuse Assessment: Patient not domesticly abuse (verbal, physical, and financial)    Current Alcohol use: no   reports no history of alcohol use. Functional Ability:   Does the patient exhibit a steady gait? Yes   How long did it take the patient to get up and walk from a sitting position? 4 seconds   Is the patient self reliant?  (ie can do own laundry, meals, household chores) yes     Does the patient handle his/her own medications? yes     Does the patient handle his/her own money? Yes     Is the patients home safe (ie good lighting, handrails on stairs and bath, etc.)? Yes   Did you notice or did patient express any hearing difficulties? no   Did you notice of did patient express any vision difficulties? No, but told she has cataracts and to watch for this.    Were distance and reading eye charts used? n/a     Advance Care Planning:   Patient was offered the opportunity to discuss advance care planning:  Yes   Does patient have an Advance Directive: No   If no, did you provide information and forms? yes     Health Maintenance   Topic Date Due    Shingrix Vaccine Age 49> (1 of 2) Never done    COVID-19 Vaccine (3 - Booster for Titi Li series) 08/29/2021    Flu Vaccine (1) 06/30/2022 (Originally 9/1/2021)    Lipid Screen  10/12/2022    Medicare Yearly Exam  01/25/2023    Breast Cancer Screen Mammogram  08/31/2023    DTaP/Tdap/Td series (2 - Td or Tdap) 06/26/2025    Colonoscopy  10/15/2029    Hepatitis C Screening  Completed    Bone Densitometry (Dexa) Screening  Completed    Pneumococcal 65+ years  Completed             I affirm this is a Patient-Initiated Episode with a Patient who has not had a related appointment within my department in the past 7 days or scheduled within the next 24 hours.     Total Time: minutes: 11-20 minutes    Note: not billable if this call serves to triage the patient into an appointment for the relevant concern      Levon Cifuentes NP

## 2022-01-26 ENCOUNTER — PATIENT OUTREACH (OUTPATIENT)
Dept: CASE MANAGEMENT | Age: 72
End: 2022-01-26

## 2022-01-26 NOTE — ACP (ADVANCE CARE PLANNING)
Advance Care Planning   Ambulatory ACP Specialist Patient Outreach    Date:  1/26/2022    ACP Specialist:  Misa Prescott LPN    Outreach call to patient in follow-up to ACP Specialist referral from:    [x] PCP  [] Provider   [] Ambulatory Care Management [] Other     For:                  [x] Advance Directive Assistance              [] Complete Portable DNR order              [] Complete POST/MOST              [] Code Status Discussion             [] Discuss Goals of Care             [] Early ACP Decision-Making              [] Other (Specify)    Date Referral Received: 1/24/22    Today's Outreach:  [x] First   [] Second  [] Third       Third outreach made by: [] Phone  [] Email / mail    [] [employer]     Intervention:  [x] Spoke with Patient   [] Left VM requesting return call      Outcome: Spoke with pt who wishes to moved forward in having ACP conversation with ACP specialist. Appointment scheduled for 2/15/2022 at [address] [place] [address]. ACP information has been mailed to the pt for review prior to appointment. She will be completing AMD with spouse. Next Step:   [x] ACP scheduled conversation  [] Outreach again in one week               [x] Email / Mail ACP Info Sheets  [] Email / Mail Advance Directive   [] Closing referral.  Routing closure to referring provider/staff and to ACP Specialist . [] Closure letter mailed to patient with invitation to contact ACP Specialist if / when ready.   Thank you for this referral.

## 2022-03-01 ENCOUNTER — DOCUMENTATION ONLY (OUTPATIENT)
Dept: CASE MANAGEMENT | Age: 72
End: 2022-03-01

## 2022-03-01 NOTE — ACP (ADVANCE CARE PLANNING)
Advance Care Planning   Ambulatory ACP Specialist Patient Outreach    Date:  3/1/2022    ACP Specialist:  Blank Moses LCSW    Outreach call to patient in follow-up to ACP Specialist referral from:    [x] PCP  [] Provider   [] Ambulatory Care Management [] Other     For:                  [x] Advance Directive Assistance              [] Complete Portable DNR order              [] Complete POST/MOST              [] Code Status Discussion             [] Discuss Goals of Care             [] Early ACP Decision-Making              [] Other (Specify)    Date Referral Received:1/24/22    Today's Outreach:  [] First   [x] Second  [] Third       Third outreach made by: [] Phone  [] Email / mail    [] MyChart     Intervention:  [] Spoke with Patient   [x] Left VM requesting return call      Outcome:Pt had ACP appt scheduled for 2/15/2022. However, it was forward to 3/2/2022 @ 3:00 pm. Specialist left message today with regret to any miscommunication. Offered to reschedule if she is not able to meet on 3/2/2022. Requested a return call to confirm. 3/2/2022: Appt not kept. Spouse in hospital. Will defer to inpt care manager for possible conversation. Will close referral at this time. Next Step:   [x] ACP scheduled conversation  [] Outreach again in one week               [] Email / Mail ACP Info Sheets  [] Email / Mail Advance Directive   [] Closing referral.  Routing closure to referring provider/staff and to ACP Specialist . [] Closure letter mailed to patient with invitation to contact ACP Specialist if / when ready.   Thank you for this referral.    Blank Moses LCSW

## 2022-03-02 ENCOUNTER — DOCUMENTATION ONLY (OUTPATIENT)
Dept: CASE MANAGEMENT | Age: 72
End: 2022-03-02

## 2022-03-18 PROBLEM — Z87.891 FORMER SMOKER: Status: ACTIVE | Noted: 2019-07-30

## 2022-03-18 PROBLEM — Z98.890 S/P COLONOSCOPIC POLYPECTOMY: Status: ACTIVE | Noted: 2019-10-21

## 2022-03-18 PROBLEM — F32.A ANXIETY AND DEPRESSION: Status: ACTIVE | Noted: 2017-05-15

## 2022-03-18 PROBLEM — F41.9 ANXIETY AND DEPRESSION: Status: ACTIVE | Noted: 2017-05-15

## 2022-03-19 PROBLEM — F17.210 CIGARETTE NICOTINE DEPENDENCE WITHOUT COMPLICATION: Status: ACTIVE | Noted: 2017-04-25

## 2022-06-05 DIAGNOSIS — G89.29 CHRONIC PAIN OF BOTH KNEES: ICD-10-CM

## 2022-06-05 DIAGNOSIS — M25.562 CHRONIC PAIN OF BOTH KNEES: ICD-10-CM

## 2022-06-05 DIAGNOSIS — M25.561 CHRONIC PAIN OF BOTH KNEES: ICD-10-CM

## 2022-06-05 DIAGNOSIS — M17.11 PRIMARY OSTEOARTHRITIS OF RIGHT KNEE: ICD-10-CM

## 2022-06-06 RX ORDER — DICLOFENAC SODIUM 75 MG/1
TABLET, DELAYED RELEASE ORAL
Qty: 180 TABLET | Refills: 3 | Status: SHIPPED | OUTPATIENT
Start: 2022-06-06

## 2022-06-14 NOTE — TELEPHONE ENCOUNTER
Pt has appt woth you 7/19/22.  Cassie Almanzar had death in her family  and now her  is in the hospital

## 2022-07-19 ENCOUNTER — OFFICE VISIT (OUTPATIENT)
Dept: INTERNAL MEDICINE CLINIC | Age: 72
End: 2022-07-19
Payer: MEDICARE

## 2022-07-19 VITALS
RESPIRATION RATE: 18 BRPM | HEART RATE: 78 BPM | TEMPERATURE: 97 F | DIASTOLIC BLOOD PRESSURE: 76 MMHG | SYSTOLIC BLOOD PRESSURE: 127 MMHG | OXYGEN SATURATION: 97 % | BODY MASS INDEX: 32.57 KG/M2 | HEIGHT: 62 IN | WEIGHT: 177 LBS

## 2022-07-19 DIAGNOSIS — E78.2 MIXED HYPERLIPIDEMIA: ICD-10-CM

## 2022-07-19 DIAGNOSIS — G89.29 CHRONIC PAIN OF BOTH KNEES: ICD-10-CM

## 2022-07-19 DIAGNOSIS — F43.21 GRIEF: Primary | ICD-10-CM

## 2022-07-19 DIAGNOSIS — M25.561 CHRONIC PAIN OF BOTH KNEES: ICD-10-CM

## 2022-07-19 DIAGNOSIS — M25.562 CHRONIC PAIN OF BOTH KNEES: ICD-10-CM

## 2022-07-19 DIAGNOSIS — M17.11 PRIMARY OSTEOARTHRITIS OF RIGHT KNEE: ICD-10-CM

## 2022-07-19 DIAGNOSIS — Z12.31 ENCOUNTER FOR SCREENING MAMMOGRAM FOR BREAST CANCER: ICD-10-CM

## 2022-07-19 PROCEDURE — G8752 SYS BP LESS 140: HCPCS | Performed by: NURSE PRACTITIONER

## 2022-07-19 PROCEDURE — 1090F PRES/ABSN URINE INCON ASSESS: CPT | Performed by: NURSE PRACTITIONER

## 2022-07-19 PROCEDURE — G8399 PT W/DXA RESULTS DOCUMENT: HCPCS | Performed by: NURSE PRACTITIONER

## 2022-07-19 PROCEDURE — 1101F PT FALLS ASSESS-DOCD LE1/YR: CPT | Performed by: NURSE PRACTITIONER

## 2022-07-19 PROCEDURE — G8536 NO DOC ELDER MAL SCRN: HCPCS | Performed by: NURSE PRACTITIONER

## 2022-07-19 PROCEDURE — G8427 DOCREV CUR MEDS BY ELIG CLIN: HCPCS | Performed by: NURSE PRACTITIONER

## 2022-07-19 PROCEDURE — G8754 DIAS BP LESS 90: HCPCS | Performed by: NURSE PRACTITIONER

## 2022-07-19 PROCEDURE — G8417 CALC BMI ABV UP PARAM F/U: HCPCS | Performed by: NURSE PRACTITIONER

## 2022-07-19 PROCEDURE — 3017F COLORECTAL CA SCREEN DOC REV: CPT | Performed by: NURSE PRACTITIONER

## 2022-07-19 PROCEDURE — 99214 OFFICE O/P EST MOD 30 MIN: CPT | Performed by: NURSE PRACTITIONER

## 2022-07-19 PROCEDURE — G9717 DOC PT DX DEP/BP F/U NT REQ: HCPCS | Performed by: NURSE PRACTITIONER

## 2022-07-19 PROCEDURE — G9899 SCRN MAM PERF RSLTS DOC: HCPCS | Performed by: NURSE PRACTITIONER

## 2022-07-19 RX ORDER — ICOSAPENT ETHYL 1000 MG/1
2 CAPSULE ORAL 2 TIMES DAILY
Qty: 360 CAPSULE | Refills: 3 | Status: SHIPPED | OUTPATIENT
Start: 2022-07-19

## 2022-07-19 RX ORDER — LIDOCAINE 50 MG/G
PATCH TOPICAL
Qty: 30 EACH | Refills: 11 | Status: SHIPPED | OUTPATIENT
Start: 2022-07-19

## 2022-07-19 NOTE — PROGRESS NOTES
Pt is here for   Chief Complaint   Patient presents with    Grief Counseling     pt states  of 62 years recently passed away 7/4/2022 and 2 weeks prior to this her brother passed away     Cholesterol Problem     follow up     1. Have you been to the ER, urgent care clinic since your last visit? Hospitalized since your last visit? No    2. Have you seen or consulted any other health care providers outside of the [address] [place] [address] since your last visit? Include any pap smears or colon screening.  No    Denies pain at this time

## 2022-07-19 NOTE — PATIENT INSTRUCTIONS
Learning About How to [place] When Things Feel Out of Control  What are some things you can do? When life feels chaotic or overwhelming, it can be easy to get stuck in a cycle of stress and worry. But there are things you can do to cope with worry and find some calm. Here are some tips. · Acknowledge your feelings. Try to recognize what you're feeling when you're feeling it, without judging it as \"good\" or \"bad. \" It might help to write down how you're feeling and why. · Pay attention to your mindset. The way you think about things really does affect the way you feel. If you tell yourself that something is too hard or too stressful, it's going to feel that way. But if you tell yourself you can handle something hard, you're more likely to be able to. · Focus more on what you can control and less on what you can't. Here are some ideas:  ? Make a list of the things that cause you stress. Then decide which things on the list you can take action on and which ones you can't. This can remind you what's in your control and what isn't.  ? Look for sources of stress that you can limit. Then take steps to limit them. That might mean turning off the news, staying away from social media, or even having less contact with certain people. ? Choose to spend time on things that are meaningful to you. For example, you could do projects with your kids, foster an animal, write postcards to friends, or do random acts of kindness for your neighbors. Do things that make you feel good or bring you jac. ? Find ways to keep your mind off of your worries and fears. It could be a project, a hobby, or even making an effort to call a friend on the phone once a week. Whatever you decide, choose things that are in line with your values. · Be careful about coping strategies that might make things worse. Keeping yourself busy might take your mind off your stress. But it can also exhaust you or even add stress.  A glass of wine or a beer in the evening may help some people relax. But drinking isn't a great way to deal with stress. It can actually make stress and anxiety worse. If you find that stress and anxiety are making it hard to manage daily life, talk to a doctor. Current as of: August 24, 2021               Content Version: 13.2  © 2006-2022 [employer]. Care instructions adapted under license by "[employer] (which disclaims liability or warranty for this information). If you have questions about a medical condition or this instruction, always ask your healthcare professional. [address] any warranty or liability for your use of this information. Grief (Actual/Anticipated): Care Instructions  Overview     Grief is an emotional and physical reaction to a major loss. The words \"sorrow\" and \"heartache\" often are used to describe feelings of grief. You may feel grief when you lose a beloved person, pet, place, or thing. It is also natural to feel grief when you lose a valued way of life, such as a job, marriage, or good health. You may begin to grieve before a loss occurs. You may grieve for a loved one who is sick and dying. Children and adults often feel the pain of loss before a big move or divorce. Grief is different for each person. There is no \"normal\" or \"expected\" period of time for grieving. Grieving can cause problems such as headaches, loss of appetite, and trouble with thinking or sleeping. You may withdraw from friends and family and behave in ways that are unusual for you. Grief may cause you to question your beliefs and views about life. Grief is natural and does not require medical treatment. It may help to talk with people who have been through or are going through similar losses. You may also want to talk to a counselor about your feelings.  Talking about your loss, sharing your cares and concerns, and getting support from others are important parts of healthy grieving. Follow-up care is a key part of your treatment and safety. Be sure to make and go to all appointments, and call your doctor if you are having problems. It's also a good idea to know your test results and keep a list of the medicines you take. How can you care for yourself at home? · Get enough sleep. Missing sleep may make it harder for you to cope with your grief. · Eat healthy foods. Ask someone to join you for a meal if you don't like eating alone. · Get some exercise every day. Even a walk can help you deal with your grief. Other exercises, such as yoga, may also help you manage stress. · Stay involved in your life. Don't withdraw from the activities you enjoy. People you know at work, [employer], clubs, or other groups can help you. · Comfort yourself. Familiar surroundings and special items, such as photos or a loved one's favorite shirt, may give you comfort. · Think about joining a support group. When should you call for help? Call 911 anytime you think you may need emergency care. For example, call if:    · You feel you cannot stop from hurting yourself or someone else. Watch closely for changes in your health, and be sure to contact your doctor if:    · You think you may be depressed.     · You do not get better as expected. Where can you learn more? Go to http://www.gray.com/  Enter H249 in the search box to learn more about \"Grief (Actual/Anticipated): Care Instructions. \"  Current as of: October 18, 2021               Content Version: 13.2  © 8652-3454 [employer]. Care instructions adapted under license by [employer] (which disclaims liability or warranty for this information). If you have questions about a medical condition or this instruction, always ask your healthcare professional. [address] any warranty or liability for your use of this information.

## 2022-08-07 DIAGNOSIS — E78.2 MIXED HYPERLIPIDEMIA: ICD-10-CM

## 2022-08-07 RX ORDER — AMLODIPINE BESYLATE AND ATORVASTATIN CALCIUM 10; 80 MG/1; MG/1
TABLET, FILM COATED ORAL
Qty: 90 TABLET | Refills: 3 | Status: SHIPPED | OUTPATIENT
Start: 2022-08-07

## 2022-08-19 DIAGNOSIS — R63.4 UNINTENDED WEIGHT LOSS: ICD-10-CM

## 2022-08-19 RX ORDER — FAMOTIDINE 40 MG/1
40 TABLET, FILM COATED ORAL DAILY
Qty: 90 TABLET | Refills: 3 | Status: SHIPPED | OUTPATIENT
Start: 2022-08-19

## 2022-09-06 ENCOUNTER — HOSPITAL ENCOUNTER (OUTPATIENT)
Dept: MAMMOGRAPHY | Age: 72
Discharge: HOME | End: 2022-09-06
Attending: NURSE PRACTITIONER
Payer: MEDICARE

## 2022-09-06 DIAGNOSIS — Z12.31 ENCOUNTER FOR SCREENING MAMMOGRAM FOR BREAST CANCER: ICD-10-CM

## 2022-09-06 PROCEDURE — 77063 BREAST TOMOSYNTHESIS BI: CPT

## 2022-09-07 NOTE — TELEPHONE ENCOUNTER
PT states she has appt  @ [facility] to a see physician for colonoscopy on 9/26/19 @ 1:30pm. She did not provide physician's name. She needs [employer] referral please.   Pt #[phone]    PT CALLED again on 9/18/19 to check status on referral for Dr. Bertram Vaughan of 9/26/19 Pt to have LP--will be staying in IR suite with Rad to perform.

## 2022-10-20 ENCOUNTER — OFFICE VISIT (OUTPATIENT)
Dept: INTERNAL MEDICINE CLINIC | Age: 72
End: 2022-10-20
Payer: MEDICARE

## 2022-10-20 VITALS
RESPIRATION RATE: 18 BRPM | DIASTOLIC BLOOD PRESSURE: 61 MMHG | BODY MASS INDEX: 34.6 KG/M2 | HEIGHT: 62 IN | WEIGHT: 188 LBS | TEMPERATURE: 97.7 F | SYSTOLIC BLOOD PRESSURE: 125 MMHG | OXYGEN SATURATION: 100 % | HEART RATE: 80 BPM

## 2022-10-20 DIAGNOSIS — F41.9 ANXIETY AND DEPRESSION: ICD-10-CM

## 2022-10-20 DIAGNOSIS — I10 ESSENTIAL HYPERTENSION WITH GOAL BLOOD PRESSURE LESS THAN 140/90: ICD-10-CM

## 2022-10-20 DIAGNOSIS — E78.2 MIXED HYPERLIPIDEMIA: Primary | ICD-10-CM

## 2022-10-20 DIAGNOSIS — F43.21 GRIEF: ICD-10-CM

## 2022-10-20 DIAGNOSIS — F32.A ANXIETY AND DEPRESSION: ICD-10-CM

## 2022-10-20 DIAGNOSIS — M89.9 DISORDER OF BONE, UNSPECIFIED: ICD-10-CM

## 2022-10-20 PROCEDURE — 1090F PRES/ABSN URINE INCON ASSESS: CPT | Performed by: NURSE PRACTITIONER

## 2022-10-20 PROCEDURE — G8752 SYS BP LESS 140: HCPCS | Performed by: NURSE PRACTITIONER

## 2022-10-20 PROCEDURE — G8754 DIAS BP LESS 90: HCPCS | Performed by: NURSE PRACTITIONER

## 2022-10-20 PROCEDURE — G8536 NO DOC ELDER MAL SCRN: HCPCS | Performed by: NURSE PRACTITIONER

## 2022-10-20 PROCEDURE — G8427 DOCREV CUR MEDS BY ELIG CLIN: HCPCS | Performed by: NURSE PRACTITIONER

## 2022-10-20 PROCEDURE — G8399 PT W/DXA RESULTS DOCUMENT: HCPCS | Performed by: NURSE PRACTITIONER

## 2022-10-20 PROCEDURE — 3017F COLORECTAL CA SCREEN DOC REV: CPT | Performed by: NURSE PRACTITIONER

## 2022-10-20 PROCEDURE — G8417 CALC BMI ABV UP PARAM F/U: HCPCS | Performed by: NURSE PRACTITIONER

## 2022-10-20 PROCEDURE — 1101F PT FALLS ASSESS-DOCD LE1/YR: CPT | Performed by: NURSE PRACTITIONER

## 2022-10-20 PROCEDURE — G9899 SCRN MAM PERF RSLTS DOC: HCPCS | Performed by: NURSE PRACTITIONER

## 2022-10-20 PROCEDURE — G9717 DOC PT DX DEP/BP F/U NT REQ: HCPCS | Performed by: NURSE PRACTITIONER

## 2022-10-20 PROCEDURE — 99214 OFFICE O/P EST MOD 30 MIN: CPT | Performed by: NURSE PRACTITIONER

## 2022-10-20 RX ORDER — MIRTAZAPINE 45 MG/1
TABLET, FILM COATED ORAL
COMMUNITY
Start: 2022-10-10

## 2022-10-20 RX ORDER — TRAZODONE HYDROCHLORIDE 50 MG/1
TABLET ORAL
COMMUNITY
Start: 2022-10-10

## 2022-10-20 RX ORDER — LINACLOTIDE 145 UG/1
CAPSULE, GELATIN COATED ORAL
COMMUNITY
Start: 2022-08-09

## 2022-10-20 NOTE — PROGRESS NOTES
Brianna Pena (: 1950) is a 71 y.o. female, established patient, here for evaluation of the following chief complaint(s):  Follow-up (CHOL, grief)       ASSESSMENT/PLAN:  Below is the assessment and plan developed based on review of pertinent history, physical exam, labs, studies, and medications. 1. Mixed hyperlipidemia  -     LIPID PANEL  2. Essential hypertension with goal blood pressure less than 492/28  -     METABOLIC PANEL, BASIC  -     CBC W/O DIFF  3. Anxiety and depression  -     TSH 3RD GENERATION  -     VITAMIN D, 25 HYDROXY; Future  4. Disorder of bone, unspecified   -     VITAMIN D, 25 HYDROXY; Future  5. Grief      Return in about 3 months (around 2023) for OV- BMI, HTN, lab rev. Pt asked to complete follow by next visit: continue current plan. Allow self TIME to heal and grieve. Keep appts with psych and therapist as planned. SUBJECTIVE/OBJECTIVE:  HPI    Pt presents to f/u HTN, grief, & CHOL. Home BP readings running 120/70's. Taking Caduet 10-80 mg. Denies side effects from medication. Feels better with her grief, but today is one of her anniversaries, so taking it \"one day at a time\". Still concerned for family crowding her. Taking remeron and trazodone, but still concerned for ativan dosing of 0.25 mg again. Will see [facility] on the . BP Readings from Last 3 Encounters:   10/20/22 125/61   22 127/76   10/12/21 (!) 145/67         Concerned for weight gain. Walks with sister, but eating \"everything\", [employer] donuts and coffee.   Change in Patient Weight by Encounter ([phone])    (10/20/2022) Office Visit  Last Recorded Weight: 85.3 kg (188 lb)             Percent Change: +6.23%   [2022 to 10/20/2022 (93 Days)]    (2022) Office Visit  Last Recorded Weight: 80.3 kg (177 lb)             Review of Systems  Constitutional: negative for fevers, chills, anorexia and weight loss  Respiratory:  negative for cough, hemoptysis, dyspnea, and wheezing  CV:   negative for chest pain, palpitations, and lower extremity edema  GI:   negative for nausea, vomiting, diarrhea, abdominal pain, and melena  Endo:               negative for polyuria,polydipsia,polyphagia, and heat intolerance  Genitourinary: negative for frequency, urgency, dysuria, retention, and hematuria  Integument:  negative for rash, ulcerations, and pruritus  Hematologic:  negative for easy bruising and bleeding  Musculoskel: Knee arthralgias, helped by lidocaine, needs refill. negative for muscle weakness,and joint pain/swelling  Neurological:  negative for headaches, dizziness, vertigo,and memory/gait problems  Behavl/Psych: negative for feelings of anxiety, depression, suicide, and mood changes    Visit Vitals  /61 (BP 1 Location: Left upper arm, BP Patient Position: Sitting, BP Cuff Size: Large adult)   Pulse 80   Temp 97.7 °F (36.5 °C) (Temporal)   Resp 18   Ht 5' 2\" (1.575 m)   Wt 188 lb (85.3 kg)   SpO2 100%   BMI 34.39 kg/m²       Wt Readings from Last 3 Encounters:   10/20/22 188 lb (85.3 kg)   07/19/22 177 lb (80.3 kg)   10/12/21 178 lb (80.7 kg)         Physical Exam:   General appearance - alert, well appearing, and in no distress. Mental status - A/O x 4,sad mood and affect. Chest - CTA. Symmetric chest rise. No wheezing. No distress. Heart - Normal rate & rhythm. Normal S1 & S2. No MGR. Abdomen- Soft, round. Non-distended, NT. No pulsatile masses or hernias. Ext-  No pedal edema, clubbing, or cyanosis. Skin-Warm and dry. No hyperpigmentation, ulcerations, or suspicious lesions. Neuro - Normal speech, no focal findings or movement disorder. Normal strength, gait, and muscle tone. An electronic signature was used to authenticate this note.   -- Cate Fan NP

## 2022-10-20 NOTE — PROGRESS NOTES
Pt is here for   Chief Complaint   Patient presents with    Follow-up     CHOL, grief     1. Have you been to the ER, urgent care clinic since your last visit? Hospitalized since your last visit? No    2. Have you seen or consulted any other health care providers outside of the [address] [place] [address] since your last visit? Include any pap smears or colon screening.  No    Denies pain at this time

## 2022-10-21 DIAGNOSIS — E55.9 VITAMIN D DEFICIENCY: Primary | ICD-10-CM

## 2022-10-21 LAB
25(OH)D3+25(OH)D2 SERPL-MCNC: 9.6 NG/ML (ref 30–100)
BUN SERPL-MCNC: 9 MG/DL (ref 8–27)
BUN/CREAT SERPL: 12 (ref 12–28)
CALCIUM SERPL-MCNC: 10.2 MG/DL (ref 8.7–10.3)
CHLORIDE SERPL-SCNC: 103 MMOL/L (ref 96–106)
CHOLEST SERPL-MCNC: 183 MG/DL (ref 100–199)
CO2 SERPL-SCNC: 24 MMOL/L (ref 20–29)
CREAT SERPL-MCNC: 0.78 MG/DL (ref 0.57–1)
EGFR: 81 ML/MIN/1.73
ERYTHROCYTE [DISTWIDTH] IN BLOOD BY AUTOMATED COUNT: 13.1 % (ref 11.7–15.4)
GLUCOSE SERPL-MCNC: 89 MG/DL (ref 70–99)
HCT VFR BLD AUTO: 39.8 % (ref 34–46.6)
HDLC SERPL-MCNC: 51 MG/DL
HGB BLD-MCNC: 12.5 G/DL (ref 11.1–15.9)
IMP & REVIEW OF LAB RESULTS: NORMAL
LDLC SERPL CALC-MCNC: 110 MG/DL (ref 0–99)
MCH RBC QN AUTO: 26.7 PG (ref 26.6–33)
MCHC RBC AUTO-ENTMCNC: 31.4 G/DL (ref 31.5–35.7)
MCV RBC AUTO: 85 FL (ref 79–97)
PLATELET # BLD AUTO: 290 X10E3/UL (ref 150–450)
POTASSIUM SERPL-SCNC: 3.7 MMOL/L (ref 3.5–5.2)
RBC # BLD AUTO: 4.68 X10E6/UL (ref 3.77–5.28)
SODIUM SERPL-SCNC: 145 MMOL/L (ref 134–144)
TRIGL SERPL-MCNC: 121 MG/DL (ref 0–149)
TSH SERPL DL<=0.005 MIU/L-ACNC: 1.78 UIU/ML (ref 0.45–4.5)
VLDLC SERPL CALC-MCNC: 22 MG/DL (ref 5–40)
WBC # BLD AUTO: 6.1 X10E3/UL (ref 3.4–10.8)

## 2022-10-21 RX ORDER — ERGOCALCIFEROL 1.25 MG/1
50000 CAPSULE ORAL
Qty: 12 CAPSULE | Refills: 3 | Status: SHIPPED | OUTPATIENT
Start: 2022-10-21

## 2022-11-10 ENCOUNTER — TELEPHONE (OUTPATIENT)
Dept: INTERNAL MEDICINE CLINIC | Age: 72
End: 2022-11-10

## 2022-11-10 DIAGNOSIS — E55.9 VITAMIN D DEFICIENCY: ICD-10-CM

## 2022-11-10 RX ORDER — ERGOCALCIFEROL 1.25 MG/1
50000 CAPSULE ORAL
Qty: 12 CAPSULE | Refills: 3 | OUTPATIENT
Start: 2022-11-10

## 2022-11-10 NOTE — TELEPHONE ENCOUNTER
Pt given your message per your instructions. She understood and She will call [employer] to find out how much it costs.

## 2022-11-10 NOTE — TELEPHONE ENCOUNTER
Pt called today stating the Vitamin D you prescribed is not covered by her ins. She wants to know which Vitamin D OTC she should buy.   Pt # [phone]

## 2022-11-11 NOTE — TELEPHONE ENCOUNTER
Pt states her brother is picking up the medication for her today. I called the pharmacy to see if there was an issue and kept being put on hold.

## 2023-01-24 ENCOUNTER — OFFICE VISIT (OUTPATIENT)
Dept: INTERNAL MEDICINE CLINIC | Age: 73
End: 2023-01-24
Payer: MEDICARE

## 2023-01-24 VITALS
BODY MASS INDEX: 32.94 KG/M2 | RESPIRATION RATE: 18 BRPM | WEIGHT: 179 LBS | HEART RATE: 90 BPM | HEIGHT: 62 IN | DIASTOLIC BLOOD PRESSURE: 72 MMHG | SYSTOLIC BLOOD PRESSURE: 118 MMHG | TEMPERATURE: 97.5 F | OXYGEN SATURATION: 97 %

## 2023-01-24 DIAGNOSIS — F43.21 GRIEF: ICD-10-CM

## 2023-01-24 DIAGNOSIS — I10 ESSENTIAL HYPERTENSION WITH GOAL BLOOD PRESSURE LESS THAN 140/90: Primary | ICD-10-CM

## 2023-01-24 DIAGNOSIS — E55.9 VITAMIN D DEFICIENCY: ICD-10-CM

## 2023-01-24 DIAGNOSIS — E78.2 MIXED HYPERLIPIDEMIA: ICD-10-CM

## 2023-01-24 PROCEDURE — 1101F PT FALLS ASSESS-DOCD LE1/YR: CPT | Performed by: NURSE PRACTITIONER

## 2023-01-24 PROCEDURE — G8536 NO DOC ELDER MAL SCRN: HCPCS | Performed by: NURSE PRACTITIONER

## 2023-01-24 PROCEDURE — G8427 DOCREV CUR MEDS BY ELIG CLIN: HCPCS | Performed by: NURSE PRACTITIONER

## 2023-01-24 PROCEDURE — 99214 OFFICE O/P EST MOD 30 MIN: CPT | Performed by: NURSE PRACTITIONER

## 2023-01-24 PROCEDURE — G9717 DOC PT DX DEP/BP F/U NT REQ: HCPCS | Performed by: NURSE PRACTITIONER

## 2023-01-24 PROCEDURE — 1090F PRES/ABSN URINE INCON ASSESS: CPT | Performed by: NURSE PRACTITIONER

## 2023-01-24 PROCEDURE — G9899 SCRN MAM PERF RSLTS DOC: HCPCS | Performed by: NURSE PRACTITIONER

## 2023-01-24 PROCEDURE — G8417 CALC BMI ABV UP PARAM F/U: HCPCS | Performed by: NURSE PRACTITIONER

## 2023-01-24 PROCEDURE — G8399 PT W/DXA RESULTS DOCUMENT: HCPCS | Performed by: NURSE PRACTITIONER

## 2023-01-24 PROCEDURE — 3017F COLORECTAL CA SCREEN DOC REV: CPT | Performed by: NURSE PRACTITIONER

## 2023-01-24 RX ORDER — ERGOCALCIFEROL 1.25 MG/1
50000 CAPSULE ORAL
Qty: 12 CAPSULE | Refills: 3 | Status: CANCELLED | OUTPATIENT
Start: 2023-01-24

## 2023-01-24 NOTE — PROGRESS NOTES
Ilana Florentino (: 1950) is a 68 y.o. female, established patient, here for evaluation of the following chief complaint(s):  Follow-up (BMI, HTN, Lab review )       ASSESSMENT/PLAN:  Below is the assessment and plan developed based on review of pertinent history, physical exam, labs, studies, and medications. 1. Essential hypertension with goal blood pressure less than 140/90  2. Vitamin D deficiency  3. Mixed hyperlipidemia  4. Grief      Return in about 3 months (around 2023) for OV-HTN, CHOL labs. Pt asked to complete follow by next visit: continue current plan, get out more lately   Current Outpatient Medications:     ergocalciferol (ERGOCALCIFEROL) 1,250 mcg (50,000 unit) capsule, Take 1 Capsule by mouth every seven (7) days. , Disp: 12 Capsule, Rfl: 3    Linzess 145 mcg cap capsule, , Disp: , Rfl:     traZODone (DESYREL) 50 mg tablet, , Disp: , Rfl:     mirtazapine (REMERON) 45 mg tablet, , Disp: , Rfl:     famotidine (PEPCID) 40 mg tablet, TAKE 1 TABLET BY MOUTH DAILY. TO REPLACE (OMEPRAZOLE, PANTOPRAZOLE, OR SIMILAR), Disp: 90 Tablet, Rfl: 3    amLODIPine-atorvastatin (CADUET) 10-80 mg per tablet, TAKE 1 TABLET EVERY DAY, Disp: 90 Tablet, Rfl: 3    lidocaine (LIDODERM) 5 %, Apply 1 patch to the affected area for 12 hours a day and remove for 12 hours a day., Disp: 30 Each, Rfl: 11    icosapent ethyL (VASCEPA) 1 gram capsule, Take 2 Capsules by mouth two (2) times a day., Disp: 360 Capsule, Rfl: 3    diclofenac EC (VOLTAREN) 75 mg EC tablet, TAKE 1 TABLET TWICE DAILY WITH FOOD, Disp: 180 Tablet, Rfl: 3    LORazepam (ATIVAN) 0.5 mg tablet, , Disp: , Rfl:     aspirin delayed-release 81 mg tablet, Take 1 Tab by mouth daily. , Disp: 30 Tab, Rfl: 11      SUBJECTIVE/OBJECTIVE:  HPI    Pt presents to f/u HTN, grief, & CHOL. Has already reviewed labs online/letter, has no questions regarding the results or recommendations. Home BP readings running 130-140/70-80's. Taking Caduet 10-80 mg and Vit.  D. Denies side effects from medication. Feels good, but likes to leave areas after 30 min. BP Readings from Last 3 Encounters:   01/24/23 118/72   10/20/22 125/61   07/19/22 127/76       Review of Systems  Constitutional: negative for fevers, chills, anorexia and weight loss  Respiratory:  negative for cough, hemoptysis, dyspnea, and wheezing  CV:   negative for chest pain, palpitations, and lower extremity edema  GI:   negative for nausea, vomiting, diarrhea, abdominal pain, and melena  Endo:               negative for polyuria,polydipsia,polyphagia, and heat intolerance  Genitourinary: negative for frequency, urgency, dysuria, retention, and hematuria  Integument:  negative for rash, ulcerations, and pruritus  Hematologic:  negative for easy bruising and bleeding  Musculoskel: Knee arthralgias, helped by lidocaine, needs refill. negative for muscle weakness,and joint pain/swelling  Neurological:  negative for headaches, dizziness, vertigo,and memory/gait problems  Behavl/Psych: negative for feelings of anxiety, depression, suicide, and mood changes    Visit Vitals  /72 (BP 1 Location: Left upper arm, BP Patient Position: Sitting, BP Cuff Size: Large adult)   Pulse 90   Temp 97.5 °F (36.4 °C) (Temporal)   Resp 18   Ht 5' 2\" (1.575 m)   Wt 179 lb (81.2 kg)   SpO2 97%   BMI 32.74 kg/m²       Wt Readings from Last 3 Encounters:   01/24/23 179 lb (81.2 kg)   10/20/22 188 lb (85.3 kg)   07/19/22 177 lb (80.3 kg)         Physical Exam:   General appearance - alert, well appearing, and in no distress. Mental status - A/O x 4,normal mood and affect. Chest - CTA. Symmetric chest rise. No wheezing. No distress. Heart - Normal rate & rhythm. Normal S1 & S2. No MGR. Abdomen- Soft, round. Non-distended, NT. No pulsatile masses or hernias. Ext-  No pedal edema, clubbing, or cyanosis. Skin-Warm and dry. No hyperpigmentation, ulcerations, or suspicious lesions.   Neuro - Normal speech, no focal findings or movement disorder. Normal strength, gait, and muscle tone. An electronic signature was used to authenticate this note.   -- Amanda Park, NP

## 2023-01-24 NOTE — PROGRESS NOTES
Pt is here for   Chief Complaint   Patient presents with    Follow-up     BMI, HTN, Lab review      1. \"Have you been to the ER, urgent care clinic since your last visit? Hospitalized since your last visit? \" No    2. \"Have you seen or consulted any other health care providers outside of the [address] [place] [address] since your last visit? \" No     3. For patients aged 39-70: Has the patient had a colonoscopy / FIT/ Cologuard? Yes - Care Gap present. Most recent result on file      If the patient is female:    4. For patients aged 41-77: Has the patient had a mammogram within the past 2 years? Yes - Care Gap present. Most recent result on file      5. For patients aged 21-65: Has the patient had a pap smear?  No

## 2023-05-09 RX ORDER — AMLODIPINE BESYLATE AND ATORVASTATIN CALCIUM 10; 80 MG/1; MG/1
1 TABLET, FILM COATED ORAL DAILY
Qty: 90 TABLET | Refills: 3 | Status: SHIPPED | OUTPATIENT
Start: 2023-05-09

## 2023-05-09 NOTE — TELEPHONE ENCOUNTER
Pt is requesting rx refill for Amlodipine-atorvastatin to be sent to [facility] on [facility] [address]  Pt #[phone]

## 2023-06-06 ENCOUNTER — OFFICE VISIT (OUTPATIENT)
Facility: CLINIC | Age: 73
End: 2023-06-06
Payer: MEDICARE

## 2023-06-06 VITALS
SYSTOLIC BLOOD PRESSURE: 137 MMHG | DIASTOLIC BLOOD PRESSURE: 82 MMHG | WEIGHT: 174 LBS | HEIGHT: 62 IN | HEART RATE: 89 BPM | BODY MASS INDEX: 32.02 KG/M2 | TEMPERATURE: 97 F | OXYGEN SATURATION: 97 % | RESPIRATION RATE: 18 BRPM

## 2023-06-06 DIAGNOSIS — E66.9 OBESITY (BMI 30.0-34.9): ICD-10-CM

## 2023-06-06 DIAGNOSIS — M17.11 UNILATERAL PRIMARY OSTEOARTHRITIS, RIGHT KNEE: ICD-10-CM

## 2023-06-06 DIAGNOSIS — E78.2 MIXED HYPERLIPIDEMIA: ICD-10-CM

## 2023-06-06 DIAGNOSIS — I10 ESSENTIAL (PRIMARY) HYPERTENSION: Primary | ICD-10-CM

## 2023-06-06 PROCEDURE — 1090F PRES/ABSN URINE INCON ASSESS: CPT | Performed by: NURSE PRACTITIONER

## 2023-06-06 PROCEDURE — 3017F COLORECTAL CA SCREEN DOC REV: CPT | Performed by: NURSE PRACTITIONER

## 2023-06-06 PROCEDURE — 1123F ACP DISCUSS/DSCN MKR DOCD: CPT | Performed by: NURSE PRACTITIONER

## 2023-06-06 PROCEDURE — 4004F PT TOBACCO SCREEN RCVD TLK: CPT | Performed by: NURSE PRACTITIONER

## 2023-06-06 PROCEDURE — 99214 OFFICE O/P EST MOD 30 MIN: CPT | Performed by: NURSE PRACTITIONER

## 2023-06-06 PROCEDURE — 3078F DIAST BP <80 MM HG: CPT | Performed by: NURSE PRACTITIONER

## 2023-06-06 PROCEDURE — G8417 CALC BMI ABV UP PARAM F/U: HCPCS | Performed by: NURSE PRACTITIONER

## 2023-06-06 PROCEDURE — G8400 PT W/DXA NO RESULTS DOC: HCPCS | Performed by: NURSE PRACTITIONER

## 2023-06-06 PROCEDURE — 3074F SYST BP LT 130 MM HG: CPT | Performed by: NURSE PRACTITIONER

## 2023-06-06 PROCEDURE — G8427 DOCREV CUR MEDS BY ELIG CLIN: HCPCS | Performed by: NURSE PRACTITIONER

## 2023-06-06 RX ORDER — ALPRAZOLAM 0.5 MG/1
TABLET ORAL
COMMUNITY
Start: 2023-04-13

## 2023-06-06 RX ORDER — PAROXETINE HYDROCHLORIDE HEMIHYDRATE 12.5 MG/1
TABLET, FILM COATED, EXTENDED RELEASE ORAL
COMMUNITY
Start: 2023-04-18

## 2023-06-06 SDOH — ECONOMIC STABILITY: FOOD INSECURITY: WITHIN THE PAST 12 MONTHS, THE FOOD YOU BOUGHT JUST DIDN'T LAST AND YOU DIDN'T HAVE MONEY TO GET MORE.: NEVER TRUE

## 2023-06-06 SDOH — ECONOMIC STABILITY: INCOME INSECURITY: HOW HARD IS IT FOR YOU TO PAY FOR THE VERY BASICS LIKE FOOD, HOUSING, MEDICAL CARE, AND HEATING?: NOT HARD AT ALL

## 2023-06-06 SDOH — ECONOMIC STABILITY: HOUSING INSECURITY
IN THE LAST 12 MONTHS, WAS THERE A TIME WHEN YOU DID NOT HAVE A STEADY PLACE TO SLEEP OR SLEPT IN A SHELTER (INCLUDING NOW)?: NO

## 2023-06-06 SDOH — ECONOMIC STABILITY: FOOD INSECURITY: WITHIN THE PAST 12 MONTHS, YOU WORRIED THAT YOUR FOOD WOULD RUN OUT BEFORE YOU GOT MONEY TO BUY MORE.: NEVER TRUE

## 2023-06-06 ASSESSMENT — PATIENT HEALTH QUESTIONNAIRE - PHQ9
SUM OF ALL RESPONSES TO PHQ QUESTIONS 1-9: 4
7. TROUBLE CONCENTRATING ON THINGS, SUCH AS READING THE NEWSPAPER OR WATCHING TELEVISION: 0
3. TROUBLE FALLING OR STAYING ASLEEP: 1
9. THOUGHTS THAT YOU WOULD BE BETTER OFF DEAD, OR OF HURTING YOURSELF: 0
10. IF YOU CHECKED OFF ANY PROBLEMS, HOW DIFFICULT HAVE THESE PROBLEMS MADE IT FOR YOU TO DO YOUR WORK, TAKE CARE OF THINGS AT HOME, OR GET ALONG WITH OTHER PEOPLE: 1
6. FEELING BAD ABOUT YOURSELF - OR THAT YOU ARE A FAILURE OR HAVE LET YOURSELF OR YOUR FAMILY DOWN: 0
SUM OF ALL RESPONSES TO PHQ9 QUESTIONS 1 & 2: 2
5. POOR APPETITE OR OVEREATING: 1
4. FEELING TIRED OR HAVING LITTLE ENERGY: 0
8. MOVING OR SPEAKING SO SLOWLY THAT OTHER PEOPLE COULD HAVE NOTICED. OR THE OPPOSITE, BEING SO FIGETY OR RESTLESS THAT YOU HAVE BEEN MOVING AROUND A LOT MORE THAN USUAL: 0
1. LITTLE INTEREST OR PLEASURE IN DOING THINGS: 1
SUM OF ALL RESPONSES TO PHQ QUESTIONS 1-9: 4
2. FEELING DOWN, DEPRESSED OR HOPELESS: 1

## 2023-06-06 NOTE — PROGRESS NOTES
Review of Systems  Constitutional: negative for fevers, chills, anorexia and weight loss  Respiratory:  negative for cough, hemoptysis, dyspnea, and wheezing  CV:   negative for chest pain, palpitations, and lower extremity edema  GI:   negative for nausea, vomiting, diarrhea, abdominal pain, and melena  Endo:               negative for polyuria,polydipsia,polyphagia, and heat intolerance  Genitourinary: negative for frequency, urgency, dysuria, retention, and hematuria  Integument:  negative for rash, ulcerations, and pruritus  Hematologic:  negative for easy bruising and bleeding  Musculoskel: Knee arthralgias, helped by lidocaine, needs refill. negative for muscle weakness,and joint pain/swelling  Neurological:  negative for headaches, dizziness, vertigo,and memory/gait problems  Behavl/Psych: negative for feelings of anxiety, depression, suicide, and mood changes    Vitals:    06/06/23 1550   BP: 137/82   Site: Left Upper Arm   Position: Sitting   Cuff Size: Large Adult   Pulse: 89   Resp: 18   Temp: 97 °F (36.1 °C)   TempSrc: Temporal   SpO2: 97%   Weight: 174 lb (78.9 kg)   Height: 5' 2\" (1.575 m)       Wt Readings from Last 3 Encounters:   06/06/23 174 lb (78.9 kg)   01/24/23 179 lb (81.2 kg)   10/20/22 188 lb (85.3 kg)           Physical Exam:   General appearance - alert, well appearing, and in no distress. Mental status - A/O x 4,normal mood and affect. Chest - CTA. Symmetric chest rise. No wheezing. No distress. Heart - Normal rate & rhythm. Normal S1 & S2. No MGR. Abdomen- Soft, round. Non-distended, NT. No pulsatile masses or hernias. Ext-  No pedal edema, clubbing, or cyanosis. Skin-Warm and dry. No hyperpigmentation, ulcerations, or suspicious lesions. Neuro - Normal speech, no focal findings or movement disorder. Normal strength, gait, and muscle tone. An electronic signature was used to authenticate this note.   -- Skylar Back NP

## 2023-08-17 ENCOUNTER — TRANSCRIBE ORDERS (OUTPATIENT)
Facility: HOSPITAL | Age: 73
End: 2023-08-17

## 2023-08-17 DIAGNOSIS — Z12.31 VISIT FOR SCREENING MAMMOGRAM: Primary | ICD-10-CM

## 2023-10-03 ENCOUNTER — OFFICE VISIT (OUTPATIENT)
Facility: CLINIC | Age: 73
End: 2023-10-03
Payer: MEDICARE

## 2023-10-03 VITALS
RESPIRATION RATE: 19 BRPM | OXYGEN SATURATION: 100 % | WEIGHT: 174.5 LBS | TEMPERATURE: 97 F | HEIGHT: 62 IN | DIASTOLIC BLOOD PRESSURE: 65 MMHG | BODY MASS INDEX: 32.11 KG/M2 | SYSTOLIC BLOOD PRESSURE: 133 MMHG | HEART RATE: 76 BPM

## 2023-10-03 DIAGNOSIS — Z11.4 SCREENING FOR HIV WITHOUT PRESENCE OF RISK FACTORS: ICD-10-CM

## 2023-10-03 DIAGNOSIS — R63.4 UNINTENTIONAL WEIGHT LOSS: ICD-10-CM

## 2023-10-03 DIAGNOSIS — R63.0 DECREASED APPETITE: ICD-10-CM

## 2023-10-03 DIAGNOSIS — I10 ESSENTIAL (PRIMARY) HYPERTENSION: ICD-10-CM

## 2023-10-03 DIAGNOSIS — Z11.59 NEED FOR HEPATITIS C SCREENING TEST: ICD-10-CM

## 2023-10-03 DIAGNOSIS — E66.9 OBESITY (BMI 30.0-34.9): ICD-10-CM

## 2023-10-03 DIAGNOSIS — E78.2 MIXED HYPERLIPIDEMIA: ICD-10-CM

## 2023-10-03 DIAGNOSIS — Z13.820 OSTEOPOROSIS SCREENING: ICD-10-CM

## 2023-10-03 DIAGNOSIS — Z00.00 MEDICARE ANNUAL WELLNESS VISIT, SUBSEQUENT: ICD-10-CM

## 2023-10-03 DIAGNOSIS — Z78.0 ASYMPTOMATIC POSTMENOPAUSAL STATE: ICD-10-CM

## 2023-10-03 DIAGNOSIS — Z00.00 MEDICARE ANNUAL WELLNESS VISIT, SUBSEQUENT: Primary | ICD-10-CM

## 2023-10-03 PROCEDURE — 3078F DIAST BP <80 MM HG: CPT | Performed by: NURSE PRACTITIONER

## 2023-10-03 PROCEDURE — G0439 PPPS, SUBSEQ VISIT: HCPCS | Performed by: NURSE PRACTITIONER

## 2023-10-03 PROCEDURE — 1090F PRES/ABSN URINE INCON ASSESS: CPT | Performed by: NURSE PRACTITIONER

## 2023-10-03 PROCEDURE — G8484 FLU IMMUNIZE NO ADMIN: HCPCS | Performed by: NURSE PRACTITIONER

## 2023-10-03 PROCEDURE — 3075F SYST BP GE 130 - 139MM HG: CPT | Performed by: NURSE PRACTITIONER

## 2023-10-03 PROCEDURE — 1123F ACP DISCUSS/DSCN MKR DOCD: CPT | Performed by: NURSE PRACTITIONER

## 2023-10-03 PROCEDURE — G8417 CALC BMI ABV UP PARAM F/U: HCPCS | Performed by: NURSE PRACTITIONER

## 2023-10-03 PROCEDURE — G8400 PT W/DXA NO RESULTS DOC: HCPCS | Performed by: NURSE PRACTITIONER

## 2023-10-03 PROCEDURE — 99214 OFFICE O/P EST MOD 30 MIN: CPT | Performed by: NURSE PRACTITIONER

## 2023-10-03 PROCEDURE — 3017F COLORECTAL CA SCREEN DOC REV: CPT | Performed by: NURSE PRACTITIONER

## 2023-10-03 PROCEDURE — 4004F PT TOBACCO SCREEN RCVD TLK: CPT | Performed by: NURSE PRACTITIONER

## 2023-10-03 PROCEDURE — G8427 DOCREV CUR MEDS BY ELIG CLIN: HCPCS | Performed by: NURSE PRACTITIONER

## 2023-10-03 RX ORDER — ERGOCALCIFEROL 1.25 MG/1
50000 CAPSULE ORAL
Qty: 12 CAPSULE | Refills: 3 | Status: SHIPPED | OUTPATIENT
Start: 2023-10-03

## 2023-10-03 RX ORDER — MEGESTROL ACETATE 40 MG/1
40 TABLET ORAL DAILY
Qty: 30 TABLET | Refills: 2 | Status: SHIPPED | OUTPATIENT
Start: 2023-10-03

## 2023-10-03 RX ORDER — CALCIUM CARBONATE 500 MG/1
1 TABLET, CHEWABLE ORAL 2 TIMES DAILY
Qty: 60 TABLET | Refills: 0 | Status: SHIPPED | OUTPATIENT
Start: 2023-10-03 | End: 2023-11-02

## 2023-10-03 ASSESSMENT — PATIENT HEALTH QUESTIONNAIRE - PHQ9
SUM OF ALL RESPONSES TO PHQ QUESTIONS 1-9: 1
2. FEELING DOWN, DEPRESSED OR HOPELESS: 1
SUM OF ALL RESPONSES TO PHQ QUESTIONS 1-9: 1
SUM OF ALL RESPONSES TO PHQ QUESTIONS 1-9: 1
1. LITTLE INTEREST OR PLEASURE IN DOING THINGS: 0
SUM OF ALL RESPONSES TO PHQ QUESTIONS 1-9: 1
SUM OF ALL RESPONSES TO PHQ9 QUESTIONS 1 & 2: 1
10. IF YOU CHECKED OFF ANY PROBLEMS, HOW DIFFICULT HAVE THESE PROBLEMS MADE IT FOR YOU TO DO YOUR WORK, TAKE CARE OF THINGS AT HOME, OR GET ALONG WITH OTHER PEOPLE: 0

## 2023-10-03 ASSESSMENT — LIFESTYLE VARIABLES
HOW OFTEN DO YOU HAVE A DRINK CONTAINING ALCOHOL: NEVER
HOW MANY STANDARD DRINKS CONTAINING ALCOHOL DO YOU HAVE ON A TYPICAL DAY: PATIENT DOES NOT DRINK

## 2023-10-03 NOTE — PATIENT INSTRUCTIONS
co-insurance, and/or copay. Some of these benefits include a comprehensive review of your medical history including lifestyle, illnesses that may run in your family, and various assessments and screenings as appropriate. After reviewing your medical record and screening and assessments performed today your provider may have ordered immunizations, labs, imaging, and/or referrals for you. A list of these orders (if applicable) as well as your Preventive Care list are included within your After Visit Summary for your review. Other Preventive Recommendations:    A preventive eye exam performed by an eye specialist is recommended every 1-2 years to screen for glaucoma; cataracts, macular degeneration, and other eye disorders. A preventive dental visit is recommended every 6 months. Try to get at least 150 minutes of exercise per week or 10,000 steps per day on a pedometer . Order or download the FREE \"Exercise & Physical Activity: Your Everyday Guide\" from [employer]. Call 8-429.810.5289 or search [employer] online. You need 0077-3648 mg of calcium and 5168-3398 IU of vitamin D per day. It is possible to meet your calcium requirement with diet alone, but a vitamin D supplement is usually necessary to meet this goal.  When exposed to the sun, use a sunscreen that protects against both UVA and UVB radiation with an SPF of 30 or greater. Reapply every 2 to 3 hours or after sweating, drying off with a towel, or swimming. Always wear a seat belt when traveling in a car. Always wear a helmet when riding a bicycle or motorcycle.

## 2023-10-04 LAB
ALBUMIN SERPL-MCNC: 4.7 G/DL (ref 3.8–4.8)
ALBUMIN/GLOB SERPL: 1.5 {RATIO} (ref 1.2–2.2)
ALP SERPL-CCNC: 177 IU/L (ref 44–121)
ALT SERPL-CCNC: 14 IU/L (ref 0–32)
AST SERPL-CCNC: 13 IU/L (ref 0–40)
BASOPHILS # BLD AUTO: 0 X10E3/UL (ref 0–0.2)
BASOPHILS NFR BLD AUTO: 1 %
BILIRUB SERPL-MCNC: 0.5 MG/DL (ref 0–1.2)
BUN SERPL-MCNC: 10 MG/DL (ref 8–27)
BUN/CREAT SERPL: 13 (ref 12–28)
CALCIUM SERPL-MCNC: 10.6 MG/DL (ref 8.7–10.3)
CHLORIDE SERPL-SCNC: 99 MMOL/L (ref 96–106)
CHOLEST SERPL-MCNC: 190 MG/DL (ref 100–199)
CO2 SERPL-SCNC: 25 MMOL/L (ref 20–29)
CREAT SERPL-MCNC: 0.76 MG/DL (ref 0.57–1)
EGFRCR SERPLBLD CKD-EPI 2021: 83 ML/MIN/1.73
EOSINOPHIL # BLD AUTO: 0.1 X10E3/UL (ref 0–0.4)
EOSINOPHIL NFR BLD AUTO: 1 %
ERYTHROCYTE [DISTWIDTH] IN BLOOD BY AUTOMATED COUNT: 13.3 % (ref 11.7–15.4)
GLOBULIN SER CALC-MCNC: 3.1 G/DL (ref 1.5–4.5)
GLUCOSE SERPL-MCNC: 95 MG/DL (ref 70–99)
HCT VFR BLD AUTO: 40.9 % (ref 34–46.6)
HCV RNA SERPL NAA+PROBE-ACNC: NORMAL IU/ML
HDLC SERPL-MCNC: 63 MG/DL
HGB BLD-MCNC: 12.8 G/DL (ref 11.1–15.9)
HIV 1+2 AB+HIV1 P24 AG SERPL QL IA: NON REACTIVE
IMM GRANULOCYTES # BLD AUTO: 0 X10E3/UL (ref 0–0.1)
IMM GRANULOCYTES NFR BLD AUTO: 0 %
LDLC SERPL CALC-MCNC: 108 MG/DL (ref 0–99)
LYMPHOCYTES # BLD AUTO: 2.6 X10E3/UL (ref 0.7–3.1)
LYMPHOCYTES NFR BLD AUTO: 42 %
MCH RBC QN AUTO: 27.2 PG (ref 26.6–33)
MCHC RBC AUTO-ENTMCNC: 31.3 G/DL (ref 31.5–35.7)
MCV RBC AUTO: 87 FL (ref 79–97)
MONOCYTES # BLD AUTO: 0.3 X10E3/UL (ref 0.1–0.9)
MONOCYTES NFR BLD AUTO: 5 %
NEUTROPHILS # BLD AUTO: 3.2 X10E3/UL (ref 1.4–7)
NEUTROPHILS NFR BLD AUTO: 51 %
PLATELET # BLD AUTO: 288 X10E3/UL (ref 150–450)
POTASSIUM SERPL-SCNC: 3.5 MMOL/L (ref 3.5–5.2)
PROT SERPL-MCNC: 7.8 G/DL (ref 6–8.5)
RBC # BLD AUTO: 4.7 X10E6/UL (ref 3.77–5.28)
SODIUM SERPL-SCNC: 142 MMOL/L (ref 134–144)
TEST INFORMATION: NORMAL
TRIGL SERPL-MCNC: 107 MG/DL (ref 0–149)
TSH SERPL DL<=0.005 MIU/L-ACNC: 1.7 UIU/ML (ref 0.45–4.5)
VLDLC SERPL CALC-MCNC: 19 MG/DL (ref 5–40)
WBC # BLD AUTO: 6.3 X10E3/UL (ref 3.4–10.8)

## 2023-10-05 LAB — IMP & REVIEW OF LAB RESULTS: NORMAL

## 2023-10-09 ENCOUNTER — TELEPHONE (OUTPATIENT)
Facility: CLINIC | Age: 73
End: 2023-10-09

## 2023-10-09 NOTE — TELEPHONE ENCOUNTER
Patient and her daughter Marilu Jung did a conference call today. They are concerned and have questions about the medication  megestrol. They read directions and it stated the medicine I for someone with cancer. The paient said she has not started taking this medication yet. They both would like a return call from you please  Ms. Dominick Dubin # [phone]  MsMilton Moses # [phone] Quality 110: Preventive Care And Screening: Influenza Immunization: Influenza Immunization not Administered for Documented Reasons. Detail Level: Detailed Quality 226: Preventive Care And Screening: Tobacco Use: Screening And Cessation Intervention: Patient screened for tobacco and never smoked Additional Notes: Has not received flu vaccine due to personal reasons Quality 431: Preventive Care And Screening: Unhealthy Alcohol Use - Screening: Patient screened for unhealthy alcohol use using a single question and scores less than 2 times per year Quality 130: Documentation Of Current Medications In The Medical Record: Current Medications Documented

## 2023-10-09 NOTE — TELEPHONE ENCOUNTER
Pt and her daughter were called on 10/9/23 both given message per your instructions.  They understood

## 2023-10-10 DIAGNOSIS — Z13.820 OSTEOPOROSIS SCREENING: ICD-10-CM

## 2023-10-10 DIAGNOSIS — Z78.0 ASYMPTOMATIC POSTMENOPAUSAL STATE: ICD-10-CM

## 2023-10-11 RX ORDER — CALCIUM CARBONATE 500 MG/1
TABLET, CHEWABLE ORAL
Qty: 60 TABLET | Refills: 0 | OUTPATIENT
Start: 2023-10-11

## 2023-10-19 ENCOUNTER — HOSPITAL ENCOUNTER (OUTPATIENT)
Facility: HOSPITAL | Age: 73
Discharge: HOME | End: 2023-10-22

## 2023-10-19 DIAGNOSIS — Z00.00 MEDICARE ANNUAL WELLNESS VISIT, SUBSEQUENT: ICD-10-CM

## 2023-10-19 DIAGNOSIS — Z13.820 OSTEOPOROSIS SCREENING: ICD-10-CM

## 2023-10-19 DIAGNOSIS — Z78.0 ASYMPTOMATIC POSTMENOPAUSAL STATE: ICD-10-CM

## 2023-10-23 ENCOUNTER — HOSPITAL ENCOUNTER (OUTPATIENT)
Facility: HOSPITAL | Age: 73
Discharge: HOME | End: 2023-10-26
Payer: MEDICARE

## 2023-10-23 VITALS — BODY MASS INDEX: 32.39 KG/M2 | WEIGHT: 176 LBS | HEIGHT: 62 IN

## 2023-10-23 DIAGNOSIS — Z12.31 VISIT FOR SCREENING MAMMOGRAM: ICD-10-CM

## 2023-10-23 PROCEDURE — 77063 BREAST TOMOSYNTHESIS BI: CPT

## 2023-10-24 ENCOUNTER — HOSPITAL ENCOUNTER (OUTPATIENT)
Facility: HOSPITAL | Age: 73
Discharge: HOME | End: 2023-10-27
Payer: MEDICARE

## 2023-10-24 PROCEDURE — 77080 DXA BONE DENSITY AXIAL: CPT

## 2023-10-30 DIAGNOSIS — M85.821 OSTEOPENIA OF RIGHT UPPER ARM: ICD-10-CM

## 2023-11-02 DIAGNOSIS — Z78.0 ASYMPTOMATIC POSTMENOPAUSAL STATE: ICD-10-CM

## 2023-11-02 DIAGNOSIS — Z13.820 OSTEOPOROSIS SCREENING: ICD-10-CM

## 2023-11-13 RX ORDER — CALCIUM CARBONATE 500 MG/1
TABLET, CHEWABLE ORAL
Qty: 60 TABLET | Refills: 0 | Status: SHIPPED | OUTPATIENT
Start: 2023-11-13

## 2024-01-01 ENCOUNTER — HOSPITAL ENCOUNTER (INPATIENT)
Facility: HOSPITAL | Age: 74
LOS: 1 days | End: 2024-09-05
Attending: FAMILY MEDICINE | Admitting: FAMILY MEDICINE
Payer: MEDICARE

## 2024-01-01 ENCOUNTER — APPOINTMENT (OUTPATIENT)
Facility: HOSPITAL | Age: 74
DRG: 686 | End: 2024-01-01
Payer: MEDICARE

## 2024-01-01 ENCOUNTER — HOSPITAL ENCOUNTER (INPATIENT)
Facility: HOSPITAL | Age: 74
Discharge: HOME | DRG: 686 | End: 2024-09-06
Payer: MEDICARE

## 2024-01-01 ENCOUNTER — HOSPITAL ENCOUNTER (INPATIENT)
Facility: HOSPITAL | Age: 74
LOS: 4 days | Discharge: HOSPICE | DRG: 686 | End: 2024-09-05
Attending: EMERGENCY MEDICINE | Admitting: ANESTHESIOLOGY
Payer: MEDICARE

## 2024-01-01 ENCOUNTER — APPOINTMENT (OUTPATIENT)
Facility: HOSPITAL | Age: 74
DRG: 686 | End: 2024-01-01
Attending: INTERNAL MEDICINE
Payer: MEDICARE

## 2024-01-01 VITALS
SYSTOLIC BLOOD PRESSURE: 144 MMHG | DIASTOLIC BLOOD PRESSURE: 78 MMHG | TEMPERATURE: 98.2 F | OXYGEN SATURATION: 71 % | RESPIRATION RATE: 28 BRPM

## 2024-01-01 VITALS
HEART RATE: 115 BPM | DIASTOLIC BLOOD PRESSURE: 81 MMHG | BODY MASS INDEX: 27.18 KG/M2 | OXYGEN SATURATION: 94 % | WEIGHT: 147.71 LBS | SYSTOLIC BLOOD PRESSURE: 170 MMHG | HEIGHT: 62 IN | RESPIRATION RATE: 30 BRPM | TEMPERATURE: 98.6 F

## 2024-01-01 DIAGNOSIS — I31.39 PERICARDIAL EFFUSION: ICD-10-CM

## 2024-01-01 DIAGNOSIS — I21.3 ST ELEVATION MYOCARDIAL INFARCTION (STEMI), UNSPECIFIED ARTERY (HCC): Primary | ICD-10-CM

## 2024-01-01 DIAGNOSIS — I30.9 PERICARDIAL EFFUSION, ACUTE: ICD-10-CM

## 2024-01-01 LAB
ACID FAST STN SPEC: NEGATIVE
ALBUMIN SERPL-MCNC: 2.1 G/DL (ref 3.5–5)
ALBUMIN SERPL-MCNC: 2.8 G/DL (ref 3.5–5)
ALBUMIN/GLOB SERPL: 0.6 (ref 1.1–2.2)
ALBUMIN/GLOB SERPL: 0.6 (ref 1.1–2.2)
ALP SERPL-CCNC: 128 U/L (ref 45–117)
ALP SERPL-CCNC: 178 U/L (ref 45–117)
ALT SERPL-CCNC: 26 U/L (ref 12–78)
ALT SERPL-CCNC: 36 U/L (ref 12–78)
ANION GAP SERPL CALC-SCNC: 1 MMOL/L (ref 5–15)
ANION GAP SERPL CALC-SCNC: 2 MMOL/L (ref 5–15)
ANION GAP SERPL CALC-SCNC: 3 MMOL/L (ref 5–15)
ANION GAP SERPL CALC-SCNC: 6 MMOL/L (ref 5–15)
APPEARANCE FLD: ABNORMAL
ARTERIAL PATENCY WRIST A: POSITIVE
ARTERIAL PATENCY WRIST A: POSITIVE
AST SERPL-CCNC: 19 U/L (ref 15–37)
AST SERPL-CCNC: 36 U/L (ref 15–37)
BACTERIA SPEC CULT: NORMAL
BACTERIA SPEC CULT: NORMAL
BASE EXCESS BLD CALC-SCNC: 11.1 MMOL/L
BASE EXCESS BLD CALC-SCNC: 12.5 MMOL/L
BASOPHILS # BLD: 0 K/UL (ref 0–0.1)
BASOPHILS # BLD: 0 K/UL (ref 0–0.1)
BASOPHILS # BLD: 0.1 K/UL (ref 0–0.1)
BASOPHILS NFR BLD: 0 % (ref 0–1)
BASOPHILS NFR BLD: 0 % (ref 0–1)
BASOPHILS NFR BLD: 1 % (ref 0–1)
BDY SITE: ABNORMAL
BDY SITE: ABNORMAL
BILIRUB SERPL-MCNC: 0.5 MG/DL (ref 0.2–1)
BILIRUB SERPL-MCNC: 0.5 MG/DL (ref 0.2–1)
BUN SERPL-MCNC: 10 MG/DL (ref 6–20)
BUN SERPL-MCNC: 11 MG/DL (ref 6–20)
BUN SERPL-MCNC: 11 MG/DL (ref 6–20)
BUN SERPL-MCNC: 15 MG/DL (ref 6–20)
BUN/CREAT SERPL: 13 (ref 12–20)
BUN/CREAT SERPL: 15 (ref 12–20)
BUN/CREAT SERPL: 17 (ref 12–20)
BUN/CREAT SERPL: 18 (ref 12–20)
CALCIUM SERPL-MCNC: 10 MG/DL (ref 8.5–10.1)
CALCIUM SERPL-MCNC: 10.3 MG/DL (ref 8.5–10.1)
CALCIUM SERPL-MCNC: 9.7 MG/DL (ref 8.5–10.1)
CALCIUM SERPL-MCNC: 9.9 MG/DL (ref 8.5–10.1)
CHLORIDE SERPL-SCNC: 88 MMOL/L (ref 97–108)
CHLORIDE SERPL-SCNC: 95 MMOL/L (ref 97–108)
CHLORIDE SERPL-SCNC: 96 MMOL/L (ref 97–108)
CHLORIDE SERPL-SCNC: 98 MMOL/L (ref 97–108)
CO2 SERPL-SCNC: 32 MMOL/L (ref 21–32)
CO2 SERPL-SCNC: 33 MMOL/L (ref 21–32)
CO2 SERPL-SCNC: 36 MMOL/L (ref 21–32)
CO2 SERPL-SCNC: 38 MMOL/L (ref 21–32)
COLOR FLD: ABNORMAL
COMMENT:: NORMAL
COMMENT:: NORMAL
CREAT SERPL-MCNC: 0.61 MG/DL (ref 0.55–1.02)
CREAT SERPL-MCNC: 0.71 MG/DL (ref 0.55–1.02)
CREAT SERPL-MCNC: 0.77 MG/DL (ref 0.55–1.02)
CREAT SERPL-MCNC: 0.89 MG/DL (ref 0.55–1.02)
DIFFERENTIAL METHOD BLD: ABNORMAL
ECHO AO ROOT DIAM: 3 CM
ECHO AO ROOT INDEX: 1.79 CM/M2
ECHO AV AREA PEAK VELOCITY: 1.7 CM2
ECHO AV AREA VTI: 2.2 CM2
ECHO AV AREA/BSA PEAK VELOCITY: 1 CM2/M2
ECHO AV AREA/BSA VTI: 1.3 CM2/M2
ECHO AV MEAN GRADIENT: 8 MMHG
ECHO AV MEAN VELOCITY: 1.3 M/S
ECHO AV PEAK GRADIENT: 15 MMHG
ECHO AV PEAK VELOCITY: 2 M/S
ECHO AV VELOCITY RATIO: 0.65
ECHO AV VTI: 24.7 CM
ECHO BSA: 1.74 M2
ECHO BSA: 1.74 M2
ECHO LA DIAMETER INDEX: 1.85 CM/M2
ECHO LA DIAMETER: 3.1 CM
ECHO LA TO AORTIC ROOT RATIO: 1.03
ECHO LA VOL A-L A2C: 52 ML (ref 22–52)
ECHO LA VOL A-L A4C: 52 ML (ref 22–52)
ECHO LA VOL MOD A2C: 48 ML (ref 22–52)
ECHO LA VOL MOD A4C: 49 ML (ref 22–52)
ECHO LA VOLUME AREA LENGTH: 53 ML
ECHO LA VOLUME INDEX A-L A2C: 31 ML/M2 (ref 16–34)
ECHO LA VOLUME INDEX A-L A4C: 31 ML/M2 (ref 16–34)
ECHO LA VOLUME INDEX AREA LENGTH: 32 ML/M2 (ref 16–34)
ECHO LA VOLUME INDEX MOD A2C: 29 ML/M2 (ref 16–34)
ECHO LA VOLUME INDEX MOD A4C: 29 ML/M2 (ref 16–34)
ECHO LV E' LATERAL VELOCITY: 4 CM/S
ECHO LV E' SEPTAL VELOCITY: 8 CM/S
ECHO LV EDV A2C: 55 ML
ECHO LV EDV A4C: 61 ML
ECHO LV EDV BP: 58 ML (ref 56–104)
ECHO LV EDV INDEX A4C: 36 ML/M2
ECHO LV EDV INDEX BP: 35 ML/M2
ECHO LV EDV NDEX A2C: 33 ML/M2
ECHO LV EF PHYSICIAN: 70 %
ECHO LV EJECTION FRACTION A2C: 45 %
ECHO LV EJECTION FRACTION A4C: 55 %
ECHO LV EJECTION FRACTION BIPLANE: 50 % (ref 55–100)
ECHO LV ESV A2C: 30 ML
ECHO LV ESV A4C: 28 ML
ECHO LV ESV BP: 29 ML (ref 19–49)
ECHO LV ESV INDEX A2C: 18 ML/M2
ECHO LV ESV INDEX A4C: 17 ML/M2
ECHO LV ESV INDEX BP: 17 ML/M2
ECHO LV FRACTIONAL SHORTENING: 36 % (ref 28–44)
ECHO LV INTERNAL DIMENSION DIASTOLE INDEX: 2.8 CM/M2
ECHO LV INTERNAL DIMENSION DIASTOLIC: 4.7 CM (ref 3.9–5.3)
ECHO LV INTERNAL DIMENSION SYSTOLIC INDEX: 1.79 CM/M2
ECHO LV INTERNAL DIMENSION SYSTOLIC: 3 CM
ECHO LV IVSD: 0.9 CM (ref 0.6–0.9)
ECHO LV MASS 2D: 112.5 G (ref 67–162)
ECHO LV MASS INDEX 2D: 67 G/M2 (ref 43–95)
ECHO LV POSTERIOR WALL DIASTOLIC: 0.6 CM (ref 0.6–0.9)
ECHO LV RELATIVE WALL THICKNESS RATIO: 0.26
ECHO LVOT AREA: 2.5 CM2
ECHO LVOT AV VTI INDEX: 0.84
ECHO LVOT DIAM: 1.8 CM
ECHO LVOT MEAN GRADIENT: 4 MMHG
ECHO LVOT PEAK GRADIENT: 6 MMHG
ECHO LVOT PEAK VELOCITY: 1.3 M/S
ECHO LVOT STROKE VOLUME INDEX: 31.3 ML/M2
ECHO LVOT SV: 52.6 ML
ECHO LVOT VTI: 20.7 CM
ECHO MV A VELOCITY: 1.18 M/S
ECHO MV AREA PHT: 3.4 CM2
ECHO MV E DECELERATION TIME (DT): 226.1 MS
ECHO MV E VELOCITY: 1.06 M/S
ECHO MV E/A RATIO: 0.9
ECHO MV E/E' LATERAL: 26.5
ECHO MV E/E' RATIO (AVERAGED): 19.88
ECHO MV E/E' SEPTAL: 13.25
ECHO MV PRESSURE HALF TIME (PHT): 65.6 MS
ECHO PV MAX VELOCITY: 1.4 M/S
ECHO PV PEAK GRADIENT: 8 MMHG
ECHO RV TAPSE: 1.6 CM (ref 1.7–?)
EKG ATRIAL RATE: 119 BPM
EKG ATRIAL RATE: 96 BPM
EKG DIAGNOSIS: NORMAL
EKG DIAGNOSIS: NORMAL
EKG P AXIS: 62 DEGREES
EKG P AXIS: 72 DEGREES
EKG P-R INTERVAL: 194 MS
EKG P-R INTERVAL: 202 MS
EKG Q-T INTERVAL: 306 MS
EKG Q-T INTERVAL: 362 MS
EKG QRS DURATION: 74 MS
EKG QRS DURATION: 86 MS
EKG QTC CALCULATION (BAZETT): 430 MS
EKG QTC CALCULATION (BAZETT): 457 MS
EKG R AXIS: -24 DEGREES
EKG R AXIS: -4 DEGREES
EKG T AXIS: 50 DEGREES
EKG T AXIS: 77 DEGREES
EKG VENTRICULAR RATE: 119 BPM
EKG VENTRICULAR RATE: 96 BPM
EOSINOPHIL # BLD: 0 K/UL (ref 0–0.4)
EOSINOPHIL # BLD: 0 K/UL (ref 0–0.4)
EOSINOPHIL # BLD: 0.1 K/UL (ref 0–0.4)
EOSINOPHIL NFR BLD: 0 % (ref 0–7)
EOSINOPHIL NFR BLD: 0 % (ref 0–7)
EOSINOPHIL NFR BLD: 1 % (ref 0–7)
ERYTHROCYTE [DISTWIDTH] IN BLOOD BY AUTOMATED COUNT: 15.3 % (ref 11.5–14.5)
ERYTHROCYTE [DISTWIDTH] IN BLOOD BY AUTOMATED COUNT: 15.3 % (ref 11.5–14.5)
ERYTHROCYTE [DISTWIDTH] IN BLOOD BY AUTOMATED COUNT: 15.5 % (ref 11.5–14.5)
GAS FLOW.O2 O2 DELIVERY SYS: ABNORMAL
GAS FLOW.O2 O2 DELIVERY SYS: ABNORMAL
GLOBULIN SER CALC-MCNC: 3.3 G/DL (ref 2–4)
GLOBULIN SER CALC-MCNC: 4.6 G/DL (ref 2–4)
GLUCOSE FLD-MCNC: 23 MG/DL
GLUCOSE SERPL-MCNC: 110 MG/DL (ref 65–100)
GLUCOSE SERPL-MCNC: 117 MG/DL (ref 65–100)
GLUCOSE SERPL-MCNC: 121 MG/DL (ref 65–100)
GLUCOSE SERPL-MCNC: 151 MG/DL (ref 65–100)
GRAM STN SPEC: NORMAL
HCO3 BLD-SCNC: 36.7 MMOL/L (ref 22–26)
HCO3 BLD-SCNC: 37.7 MMOL/L (ref 22–26)
HCT VFR BLD AUTO: 30.6 % (ref 35–47)
HCT VFR BLD AUTO: 31 % (ref 35–47)
HCT VFR BLD AUTO: 32.8 % (ref 35–47)
HGB BLD-MCNC: 10.5 G/DL (ref 11.5–16)
HGB BLD-MCNC: 9.7 G/DL (ref 11.5–16)
HGB BLD-MCNC: 9.8 G/DL (ref 11.5–16)
IMM GRANULOCYTES # BLD AUTO: 0 K/UL (ref 0–0.04)
IMM GRANULOCYTES # BLD AUTO: 0 K/UL (ref 0–0.04)
IMM GRANULOCYTES # BLD AUTO: 0.1 K/UL (ref 0–0.04)
IMM GRANULOCYTES NFR BLD AUTO: 0 % (ref 0–0.5)
IMM GRANULOCYTES NFR BLD AUTO: 0 % (ref 0–0.5)
IMM GRANULOCYTES NFR BLD AUTO: 1 % (ref 0–0.5)
LDH FLD L TO P-CCNC: >4000 U/L
LYMPHOCYTES # BLD: 0.5 K/UL (ref 0.8–3.5)
LYMPHOCYTES # BLD: 0.7 K/UL (ref 0.8–3.5)
LYMPHOCYTES # BLD: 0.9 K/UL (ref 0.8–3.5)
LYMPHOCYTES NFR BLD: 7 % (ref 12–49)
LYMPHOCYTES NFR BLD: 8 % (ref 12–49)
LYMPHOCYTES NFR BLD: 9 % (ref 12–49)
LYMPHOCYTES NFR FLD: 5 %
MAGNESIUM SERPL-MCNC: 1.9 MG/DL (ref 1.6–2.4)
MCH RBC QN AUTO: 27.1 PG (ref 26–34)
MCH RBC QN AUTO: 27.2 PG (ref 26–34)
MCH RBC QN AUTO: 27.4 PG (ref 26–34)
MCHC RBC AUTO-ENTMCNC: 31.6 G/DL (ref 30–36.5)
MCHC RBC AUTO-ENTMCNC: 31.7 G/DL (ref 30–36.5)
MCHC RBC AUTO-ENTMCNC: 32 G/DL (ref 30–36.5)
MCV RBC AUTO: 84.5 FL (ref 80–99)
MCV RBC AUTO: 86 FL (ref 80–99)
MCV RBC AUTO: 86.6 FL (ref 80–99)
MESOTHL CELL NFR FLD: 41 %
MONOCYTES # BLD: 0.6 K/UL (ref 0–1)
MONOCYTES # BLD: 0.8 K/UL (ref 0–1)
MONOCYTES # BLD: 0.8 K/UL (ref 0–1)
MONOCYTES NFR BLD: 10 % (ref 5–13)
MONOCYTES NFR BLD: 7 % (ref 5–13)
MONOCYTES NFR BLD: 9 % (ref 5–13)
MONOS+MACROS NFR FLD: 13 %
MYCOBACTERIUM SPEC QL CULT: NORMAL
NEUTROPHILS NFR FLD: 41 %
NEUTS SEG # BLD: 5.6 K/UL (ref 1.8–8)
NEUTS SEG # BLD: 6.5 K/UL (ref 1.8–8)
NEUTS SEG # BLD: 8.8 K/UL (ref 1.8–8)
NEUTS SEG NFR BLD: 80 % (ref 32–75)
NEUTS SEG NFR BLD: 83 % (ref 32–75)
NEUTS SEG NFR BLD: 84 % (ref 32–75)
NRBC # BLD: 0 K/UL (ref 0–0.01)
NRBC BLD-RTO: 0 PER 100 WBC
NT PRO BNP: 524 PG/ML
NUC CELL # FLD: 5751 /CU MM
O2/TOTAL GAS SETTING VFR VENT: 3 %
O2/TOTAL GAS SETTING VFR VENT: 40 %
PCO2 BLD: 50.1 MMHG (ref 35–45)
PCO2 BLD: 52.1 MMHG (ref 35–45)
PH BLD: 7.46 (ref 7.35–7.45)
PH BLD: 7.49 (ref 7.35–7.45)
PLATELET # BLD AUTO: 267 K/UL (ref 150–400)
PLATELET # BLD AUTO: 275 K/UL (ref 150–400)
PLATELET # BLD AUTO: 346 K/UL (ref 150–400)
PMV BLD AUTO: 11.1 FL (ref 8.9–12.9)
PMV BLD AUTO: 11.5 FL (ref 8.9–12.9)
PMV BLD AUTO: 12.1 FL (ref 8.9–12.9)
PO2 BLD: 57 MMHG (ref 80–100)
PO2 BLD: 71 MMHG (ref 80–100)
POTASSIUM SERPL-SCNC: 3.8 MMOL/L (ref 3.5–5.1)
POTASSIUM SERPL-SCNC: 3.8 MMOL/L (ref 3.5–5.1)
POTASSIUM SERPL-SCNC: 3.9 MMOL/L (ref 3.5–5.1)
POTASSIUM SERPL-SCNC: ABNORMAL MMOL/L (ref 3.5–5.1)
PROT SERPL-MCNC: 5.4 G/DL (ref 6.4–8.2)
PROT SERPL-MCNC: 7.4 G/DL (ref 6.4–8.2)
PROT SERPL-MCNC: 7.4 G/DL (ref 6.4–8.2)
RBC # BLD AUTO: 3.56 M/UL (ref 3.8–5.2)
RBC # BLD AUTO: 3.58 M/UL (ref 3.8–5.2)
RBC # BLD AUTO: 3.88 M/UL (ref 3.8–5.2)
RBC # FLD: >100 /CU MM
RBC MORPH BLD: ABNORMAL
RBC MORPH BLD: ABNORMAL
SAO2 % BLD: 89.9 % (ref 92–97)
SAO2 % BLD: 94.8 % (ref 92–97)
SERVICE CMNT-IMP: NORMAL
SODIUM SERPL-SCNC: 126 MMOL/L (ref 136–145)
SODIUM SERPL-SCNC: 130 MMOL/L (ref 136–145)
SODIUM SERPL-SCNC: 135 MMOL/L (ref 136–145)
SODIUM SERPL-SCNC: 137 MMOL/L (ref 136–145)
SPECIMEN HOLD: NORMAL
SPECIMEN HOLD: NORMAL
SPECIMEN PREPARATION: NORMAL
SPECIMEN SOURCE FLD: ABNORMAL
SPECIMEN SOURCE FLD: NORMAL
SPECIMEN SOURCE FLD: NORMAL
SPECIMEN SOURCE: NORMAL
SPECIMEN TYPE: ABNORMAL
SPECIMEN TYPE: ABNORMAL
TROPONIN I SERPL HS-MCNC: 14 NG/L (ref 0–51)
TROPONIN I SERPL HS-MCNC: 6 NG/L (ref 0–51)
VT SETTING VENT: 40 ML
WBC # BLD AUTO: 10.6 K/UL (ref 3.6–11)
WBC # BLD AUTO: 6.8 K/UL (ref 3.6–11)
WBC # BLD AUTO: 8.2 K/UL (ref 3.6–11)

## 2024-01-01 PROCEDURE — 6360000002 HC RX W HCPCS: Performed by: ANESTHESIOLOGY

## 2024-01-01 PROCEDURE — 83880 ASSAY OF NATRIURETIC PEPTIDE: CPT

## 2024-01-01 PROCEDURE — 70460 CT HEAD/BRAIN W/DYE: CPT

## 2024-01-01 PROCEDURE — 83735 ASSAY OF MAGNESIUM: CPT

## 2024-01-01 PROCEDURE — 80053 COMPREHEN METABOLIC PANEL: CPT

## 2024-01-01 PROCEDURE — 36600 WITHDRAWAL OF ARTERIAL BLOOD: CPT

## 2024-01-01 PROCEDURE — 6370000000 HC RX 637 (ALT 250 FOR IP): Performed by: INTERNAL MEDICINE

## 2024-01-01 PROCEDURE — 70470 CT HEAD/BRAIN W/O & W/DYE: CPT

## 2024-01-01 PROCEDURE — 84484 ASSAY OF TROPONIN QUANT: CPT

## 2024-01-01 PROCEDURE — 87206 SMEAR FLUORESCENT/ACID STAI: CPT

## 2024-01-01 PROCEDURE — 93306 TTE W/DOPPLER COMPLETE: CPT

## 2024-01-01 PROCEDURE — 85025 COMPLETE CBC W/AUTO DIFF WBC: CPT

## 2024-01-01 PROCEDURE — 2700000000 HC OXYGEN THERAPY PER DAY

## 2024-01-01 PROCEDURE — 6360000002 HC RX W HCPCS: Performed by: INTERNAL MEDICINE

## 2024-01-01 PROCEDURE — 6370000000 HC RX 637 (ALT 250 FOR IP): Performed by: ANESTHESIOLOGY

## 2024-01-01 PROCEDURE — 2000000000 HC ICU R&B

## 2024-01-01 PROCEDURE — 2580000003 HC RX 258: Performed by: INTERNAL MEDICINE

## 2024-01-01 PROCEDURE — 87116 MYCOBACTERIA CULTURE: CPT

## 2024-01-01 PROCEDURE — 2100000000 HC CCU R&B

## 2024-01-01 PROCEDURE — 74177 CT ABD & PELVIS W/CONTRAST: CPT

## 2024-01-01 PROCEDURE — 2580000003 HC RX 258: Performed by: ANESTHESIOLOGY

## 2024-01-01 PROCEDURE — 71045 X-RAY EXAM CHEST 1 VIEW: CPT

## 2024-01-01 PROCEDURE — 89050 BODY FLUID CELL COUNT: CPT

## 2024-01-01 PROCEDURE — 94640 AIRWAY INHALATION TREATMENT: CPT

## 2024-01-01 PROCEDURE — 99153 MOD SED SAME PHYS/QHP EA: CPT | Performed by: INTERNAL MEDICINE

## 2024-01-01 PROCEDURE — 99222 1ST HOSP IP/OBS MODERATE 55: CPT | Performed by: FAMILY MEDICINE

## 2024-01-01 PROCEDURE — 82803 BLOOD GASES ANY COMBINATION: CPT

## 2024-01-01 PROCEDURE — 94760 N-INVAS EAR/PLS OXIMETRY 1: CPT

## 2024-01-01 PROCEDURE — 99291 CRITICAL CARE FIRST HOUR: CPT

## 2024-01-01 PROCEDURE — 0W9D30Z DRAINAGE OF PERICARDIAL CAVITY WITH DRAINAGE DEVICE, PERCUTANEOUS APPROACH: ICD-10-PCS | Performed by: INTERNAL MEDICINE

## 2024-01-01 PROCEDURE — 87015 SPECIMEN INFECT AGNT CONCNTJ: CPT

## 2024-01-01 PROCEDURE — 51798 US URINE CAPACITY MEASURE: CPT

## 2024-01-01 PROCEDURE — 2500000003 HC RX 250 WO HCPCS: Performed by: ANESTHESIOLOGY

## 2024-01-01 PROCEDURE — 6360000004 HC RX CONTRAST MEDICATION: Performed by: RADIOLOGY

## 2024-01-01 PROCEDURE — 84155 ASSAY OF PROTEIN SERUM: CPT

## 2024-01-01 PROCEDURE — 93005 ELECTROCARDIOGRAM TRACING: CPT | Performed by: EMERGENCY MEDICINE

## 2024-01-01 PROCEDURE — 6360000002 HC RX W HCPCS: Performed by: FAMILY MEDICINE

## 2024-01-01 PROCEDURE — 99152 MOD SED SAME PHYS/QHP 5/>YRS: CPT | Performed by: INTERNAL MEDICINE

## 2024-01-01 PROCEDURE — 88112 CYTOPATH CELL ENHANCE TECH: CPT

## 2024-01-01 PROCEDURE — 80048 BASIC METABOLIC PNL TOTAL CA: CPT

## 2024-01-01 PROCEDURE — 82945 GLUCOSE OTHER FLUID: CPT

## 2024-01-01 PROCEDURE — 5A0945A ASSISTANCE WITH RESPIRATORY VENTILATION, 24-96 CONSECUTIVE HOURS, HIGH NASAL FLOW/VELOCITY: ICD-10-PCS | Performed by: INTERNAL MEDICINE

## 2024-01-01 PROCEDURE — C1713 ANCHOR/SCREW BN/BN,TIS/BN: HCPCS | Performed by: INTERNAL MEDICINE

## 2024-01-01 PROCEDURE — 99223 1ST HOSP IP/OBS HIGH 75: CPT | Performed by: INTERNAL MEDICINE

## 2024-01-01 PROCEDURE — 93010 ELECTROCARDIOGRAM REPORT: CPT | Performed by: INTERNAL MEDICINE

## 2024-01-01 PROCEDURE — 2709999900 HC NON-CHARGEABLE SUPPLY: Performed by: INTERNAL MEDICINE

## 2024-01-01 PROCEDURE — 93005 ELECTROCARDIOGRAM TRACING: CPT | Performed by: ANESTHESIOLOGY

## 2024-01-01 PROCEDURE — 36415 COLL VENOUS BLD VENIPUNCTURE: CPT

## 2024-01-01 PROCEDURE — 2500000003 HC RX 250 WO HCPCS: Performed by: INTERNAL MEDICINE

## 2024-01-01 PROCEDURE — 87205 SMEAR GRAM STAIN: CPT

## 2024-01-01 PROCEDURE — 33016 PERICARDIOCENTESIS W/IMAGING: CPT | Performed by: INTERNAL MEDICINE

## 2024-01-01 PROCEDURE — 87070 CULTURE OTHR SPECIMN AEROBIC: CPT

## 2024-01-01 PROCEDURE — 83615 LACTATE (LD) (LDH) ENZYME: CPT

## 2024-01-01 PROCEDURE — 88305 TISSUE EXAM BY PATHOLOGIST: CPT

## 2024-01-01 PROCEDURE — 99231 SBSQ HOSP IP/OBS SF/LOW 25: CPT | Performed by: INTERNAL MEDICINE

## 2024-01-01 PROCEDURE — 6560000002 HC HOSPICE GENERAL INPATIENT

## 2024-01-01 PROCEDURE — 6360000002 HC RX W HCPCS: Performed by: STUDENT IN AN ORGANIZED HEALTH CARE EDUCATION/TRAINING PROGRAM

## 2024-01-01 PROCEDURE — C1729 CATH, DRAINAGE: HCPCS | Performed by: INTERNAL MEDICINE

## 2024-01-01 PROCEDURE — C1894 INTRO/SHEATH, NON-LASER: HCPCS | Performed by: INTERNAL MEDICINE

## 2024-01-01 RX ORDER — BISACODYL 10 MG
10 SUPPOSITORY, RECTAL RECTAL DAILY PRN
Status: DISCONTINUED | OUTPATIENT
Start: 2024-01-01 | End: 2024-09-06 | Stop reason: HOSPADM

## 2024-01-01 RX ORDER — LORAZEPAM 1 MG/1
1 TABLET ORAL
Status: DISCONTINUED | OUTPATIENT
Start: 2024-01-01 | End: 2024-01-01

## 2024-01-01 RX ORDER — IPRATROPIUM BROMIDE AND ALBUTEROL SULFATE 2.5; .5 MG/3ML; MG/3ML
1 SOLUTION RESPIRATORY (INHALATION)
Status: DISCONTINUED | OUTPATIENT
Start: 2024-01-01 | End: 2024-01-01

## 2024-01-01 RX ORDER — SODIUM CHLORIDE 9 MG/ML
INJECTION, SOLUTION INTRAVENOUS PRN
Status: DISCONTINUED | OUTPATIENT
Start: 2024-01-01 | End: 2024-01-01 | Stop reason: HOSPADM

## 2024-01-01 RX ORDER — SODIUM CHLORIDE 0.9 % (FLUSH) 0.9 %
5-40 SYRINGE (ML) INJECTION EVERY 12 HOURS SCHEDULED
Status: DISCONTINUED | OUTPATIENT
Start: 2024-01-01 | End: 2024-01-01 | Stop reason: HOSPADM

## 2024-01-01 RX ORDER — FAMOTIDINE 20 MG/1
20 TABLET, FILM COATED ORAL 2 TIMES DAILY
Status: DISCONTINUED | OUTPATIENT
Start: 2024-01-01 | End: 2024-01-01

## 2024-01-01 RX ORDER — ERGOCALCIFEROL 1.25 MG/1
50000 CAPSULE, LIQUID FILLED ORAL WEEKLY
Qty: 12 CAPSULE | Refills: 3 | Status: SHIPPED | OUTPATIENT
Start: 2024-01-01 | End: 2024-09-06

## 2024-01-01 RX ORDER — IOPAMIDOL 755 MG/ML
100 INJECTION, SOLUTION INTRAVASCULAR
Status: DISCONTINUED | OUTPATIENT
Start: 2024-01-01 | End: 2024-01-01

## 2024-01-01 RX ORDER — ONDANSETRON 4 MG/1
4 TABLET, ORALLY DISINTEGRATING ORAL EVERY 8 HOURS PRN
Status: DISCONTINUED | OUTPATIENT
Start: 2024-01-01 | End: 2024-01-01

## 2024-01-01 RX ORDER — POLYETHYLENE GLYCOL 3350 17 G/17G
17 POWDER, FOR SOLUTION ORAL DAILY PRN
Status: DISCONTINUED | OUTPATIENT
Start: 2024-01-01 | End: 2024-01-01

## 2024-01-01 RX ORDER — LORAZEPAM 2 MG/ML
0.5 INJECTION INTRAMUSCULAR EVERY 4 HOURS PRN
Status: DISCONTINUED | OUTPATIENT
Start: 2024-01-01 | End: 2024-01-01

## 2024-01-01 RX ORDER — QUETIAPINE FUMARATE 25 MG/1
25 TABLET, FILM COATED ORAL EVERY 8 HOURS PRN
Status: DISCONTINUED | OUTPATIENT
Start: 2024-01-01 | End: 2024-01-01

## 2024-01-01 RX ORDER — GLYCOPYRROLATE 0.2 MG/ML
0.2 INJECTION INTRAMUSCULAR; INTRAVENOUS EVERY 4 HOURS
Status: DISCONTINUED | OUTPATIENT
Start: 2024-01-01 | End: 2024-09-06 | Stop reason: HOSPADM

## 2024-01-01 RX ORDER — LIDOCAINE HYDROCHLORIDE 10 MG/ML
INJECTION, SOLUTION INFILTRATION; PERINEURAL PRN
Status: DISCONTINUED | OUTPATIENT
Start: 2024-01-01 | End: 2024-01-01 | Stop reason: HOSPADM

## 2024-01-01 RX ORDER — ALBUTEROL SULFATE 0.83 MG/ML
2.5 SOLUTION RESPIRATORY (INHALATION) EVERY 6 HOURS PRN
Status: DISCONTINUED | OUTPATIENT
Start: 2024-01-01 | End: 2024-01-01 | Stop reason: HOSPADM

## 2024-01-01 RX ORDER — FENTANYL CITRATE 50 UG/ML
INJECTION, SOLUTION INTRAMUSCULAR; INTRAVENOUS PRN
Status: DISCONTINUED | OUTPATIENT
Start: 2024-01-01 | End: 2024-01-01 | Stop reason: HOSPADM

## 2024-01-01 RX ORDER — IOPAMIDOL 755 MG/ML
100 INJECTION, SOLUTION INTRAVASCULAR
Status: COMPLETED | OUTPATIENT
Start: 2024-01-01 | End: 2024-01-01

## 2024-01-01 RX ORDER — ACETAMINOPHEN 325 MG/1
650 TABLET ORAL EVERY 6 HOURS PRN
Status: DISCONTINUED | OUTPATIENT
Start: 2024-01-01 | End: 2024-01-01 | Stop reason: HOSPADM

## 2024-01-01 RX ORDER — DEXMEDETOMIDINE HYDROCHLORIDE 4 UG/ML
.1-1.5 INJECTION, SOLUTION INTRAVENOUS CONTINUOUS
Status: DISCONTINUED | OUTPATIENT
Start: 2024-01-01 | End: 2024-01-01

## 2024-01-01 RX ORDER — ALPRAZOLAM 0.25 MG
0.25 TABLET ORAL EVERY 4 HOURS PRN
Status: DISCONTINUED | OUTPATIENT
Start: 2024-01-01 | End: 2024-01-01

## 2024-01-01 RX ORDER — CLONAZEPAM 0.5 MG/1
0.5 TABLET ORAL EVERY 12 HOURS
Status: DISCONTINUED | OUTPATIENT
Start: 2024-01-01 | End: 2024-01-01 | Stop reason: HOSPADM

## 2024-01-01 RX ORDER — SODIUM CHLORIDE 0.9 % (FLUSH) 0.9 %
5-40 SYRINGE (ML) INJECTION PRN
Status: DISCONTINUED | OUTPATIENT
Start: 2024-01-01 | End: 2024-01-01

## 2024-01-01 RX ORDER — LORAZEPAM 2 MG/ML
1 INJECTION INTRAMUSCULAR EVERY 4 HOURS
Status: DISCONTINUED | OUTPATIENT
Start: 2024-01-01 | End: 2024-01-01 | Stop reason: HOSPADM

## 2024-01-01 RX ORDER — LORAZEPAM 2 MG/ML
1 INJECTION INTRAMUSCULAR
Status: DISCONTINUED | OUTPATIENT
Start: 2024-01-01 | End: 2024-09-06 | Stop reason: HOSPADM

## 2024-01-01 RX ORDER — ALPRAZOLAM 0.5 MG
0.5 TABLET ORAL EVERY 12 HOURS PRN
Status: DISCONTINUED | OUTPATIENT
Start: 2024-01-01 | End: 2024-01-01 | Stop reason: SDUPTHER

## 2024-01-01 RX ORDER — ASPIRIN 325 MG
650 TABLET, DELAYED RELEASE (ENTERIC COATED) ORAL EVERY 8 HOURS
Status: DISCONTINUED | OUTPATIENT
Start: 2024-01-01 | End: 2024-01-01

## 2024-01-01 RX ORDER — LORAZEPAM 2 MG/ML
0.25 INJECTION INTRAMUSCULAR ONCE
Status: DISCONTINUED | OUTPATIENT
Start: 2024-01-01 | End: 2024-01-01

## 2024-01-01 RX ORDER — LORAZEPAM 2 MG/ML
1 INJECTION INTRAMUSCULAR
Status: DISCONTINUED | OUTPATIENT
Start: 2024-01-01 | End: 2024-01-01 | Stop reason: HOSPADM

## 2024-01-01 RX ORDER — ALBUTEROL SULFATE 90 UG/1
2 AEROSOL, METERED RESPIRATORY (INHALATION) EVERY 6 HOURS PRN
Status: DISCONTINUED | OUTPATIENT
Start: 2024-01-01 | End: 2024-01-01 | Stop reason: CLARIF

## 2024-01-01 RX ORDER — ASPIRIN 81 MG/1
81 TABLET ORAL
Status: DISCONTINUED | OUTPATIENT
Start: 2024-01-01 | End: 2024-01-01

## 2024-01-01 RX ORDER — FUROSEMIDE 10 MG/ML
20 INJECTION INTRAMUSCULAR; INTRAVENOUS ONCE
Status: COMPLETED | OUTPATIENT
Start: 2024-01-01 | End: 2024-01-01

## 2024-01-01 RX ORDER — ACETAMINOPHEN 650 MG/1
650 SUPPOSITORY RECTAL EVERY 4 HOURS PRN
Status: DISCONTINUED | OUTPATIENT
Start: 2024-01-01 | End: 2024-09-06 | Stop reason: HOSPADM

## 2024-01-01 RX ORDER — LORAZEPAM 2 MG/ML
1 INJECTION INTRAMUSCULAR EVERY 4 HOURS PRN
Status: DISCONTINUED | OUTPATIENT
Start: 2024-01-01 | End: 2024-01-01

## 2024-01-01 RX ORDER — HALOPERIDOL 5 MG/ML
5 INJECTION INTRAMUSCULAR ONCE
Status: COMPLETED | OUTPATIENT
Start: 2024-01-01 | End: 2024-01-01

## 2024-01-01 RX ORDER — ONDANSETRON 2 MG/ML
4 INJECTION INTRAMUSCULAR; INTRAVENOUS EVERY 6 HOURS PRN
Status: DISCONTINUED | OUTPATIENT
Start: 2024-01-01 | End: 2024-01-01

## 2024-01-01 RX ORDER — ALPRAZOLAM 0.5 MG
0.5 TABLET ORAL 2 TIMES DAILY PRN
Status: DISCONTINUED | OUTPATIENT
Start: 2024-01-01 | End: 2024-01-01 | Stop reason: HOSPADM

## 2024-01-01 RX ORDER — SENNA AND DOCUSATE SODIUM 50; 8.6 MG/1; MG/1
1 TABLET, FILM COATED ORAL DAILY
COMMUNITY

## 2024-01-01 RX ORDER — COLCHICINE 0.6 MG/1
0.6 TABLET ORAL DAILY
Status: DISCONTINUED | OUTPATIENT
Start: 2024-01-01 | End: 2024-01-01 | Stop reason: HOSPADM

## 2024-01-01 RX ORDER — AMIODARONE HYDROCHLORIDE 200 MG/1
200 TABLET ORAL 2 TIMES DAILY
Status: DISCONTINUED | OUTPATIENT
Start: 2024-01-01 | End: 2024-01-01

## 2024-01-01 RX ORDER — GLYCOPYRROLATE 0.2 MG/ML
0.2 INJECTION INTRAMUSCULAR; INTRAVENOUS EVERY 4 HOURS PRN
Status: DISCONTINUED | OUTPATIENT
Start: 2024-01-01 | End: 2024-01-01

## 2024-01-01 RX ORDER — MIDAZOLAM HYDROCHLORIDE 1 MG/ML
INJECTION INTRAMUSCULAR; INTRAVENOUS PRN
Status: DISCONTINUED | OUTPATIENT
Start: 2024-01-01 | End: 2024-01-01 | Stop reason: HOSPADM

## 2024-01-01 RX ORDER — LORAZEPAM 2 MG/ML
1 INJECTION INTRAMUSCULAR EVERY 4 HOURS
Status: DISCONTINUED | OUTPATIENT
Start: 2024-01-01 | End: 2024-09-06 | Stop reason: HOSPADM

## 2024-01-01 RX ORDER — SODIUM CHLORIDE 0.9 % (FLUSH) 0.9 %
5-40 SYRINGE (ML) INJECTION PRN
Status: DISCONTINUED | OUTPATIENT
Start: 2024-01-01 | End: 2024-09-06 | Stop reason: HOSPADM

## 2024-01-01 RX ORDER — ENOXAPARIN SODIUM 100 MG/ML
40 INJECTION SUBCUTANEOUS
Status: DISCONTINUED | OUTPATIENT
Start: 2024-01-01 | End: 2024-01-01

## 2024-01-01 RX ORDER — POTASSIUM CHLORIDE 29.8 MG/ML
20 INJECTION INTRAVENOUS PRN
Status: DISCONTINUED | OUTPATIENT
Start: 2024-01-01 | End: 2024-01-01

## 2024-01-01 RX ORDER — MAGNESIUM SULFATE IN WATER 40 MG/ML
2000 INJECTION, SOLUTION INTRAVENOUS PRN
Status: DISCONTINUED | OUTPATIENT
Start: 2024-01-01 | End: 2024-01-01

## 2024-01-01 RX ORDER — ACETAMINOPHEN 650 MG/1
650 SUPPOSITORY RECTAL EVERY 6 HOURS PRN
Status: DISCONTINUED | OUTPATIENT
Start: 2024-01-01 | End: 2024-01-01 | Stop reason: HOSPADM

## 2024-01-01 RX ORDER — POTASSIUM CHLORIDE 7.45 MG/ML
10 INJECTION INTRAVENOUS PRN
Status: DISCONTINUED | OUTPATIENT
Start: 2024-01-01 | End: 2024-01-01

## 2024-01-01 RX ORDER — HALOPERIDOL 5 MG/ML
INJECTION INTRAMUSCULAR
Status: DISCONTINUED
Start: 2024-01-01 | End: 2024-01-01 | Stop reason: WASHOUT

## 2024-01-01 RX ADMIN — CLONAZEPAM 0.5 MG: 0.5 TABLET ORAL at 12:42

## 2024-01-01 RX ADMIN — IPRATROPIUM BROMIDE AND ALBUTEROL SULFATE 1 DOSE: .5; 3 SOLUTION RESPIRATORY (INHALATION) at 21:15

## 2024-01-01 RX ADMIN — SODIUM CHLORIDE, PRESERVATIVE FREE 10 ML: 5 INJECTION INTRAVENOUS at 21:00

## 2024-01-01 RX ADMIN — LORAZEPAM 1 MG: 1 TABLET ORAL at 20:51

## 2024-01-01 RX ADMIN — LORAZEPAM 0.5 MG: 2 INJECTION INTRAMUSCULAR; INTRAVENOUS at 00:14

## 2024-01-01 RX ADMIN — HYDROMORPHONE HYDROCHLORIDE 0.5 MG: 1 INJECTION, SOLUTION INTRAMUSCULAR; INTRAVENOUS; SUBCUTANEOUS at 19:26

## 2024-01-01 RX ADMIN — LORAZEPAM 1 MG: 2 INJECTION INTRAMUSCULAR; INTRAVENOUS at 10:37

## 2024-01-01 RX ADMIN — HYDROMORPHONE HYDROCHLORIDE 0.5 MG: 1 INJECTION, SOLUTION INTRAMUSCULAR; INTRAVENOUS; SUBCUTANEOUS at 22:10

## 2024-01-01 RX ADMIN — QUETIAPINE FUMARATE 25 MG: 25 TABLET ORAL at 09:11

## 2024-01-01 RX ADMIN — LORAZEPAM 1 MG: 2 INJECTION INTRAMUSCULAR; INTRAVENOUS at 17:40

## 2024-01-01 RX ADMIN — LORAZEPAM 1 MG: 2 INJECTION INTRAMUSCULAR; INTRAVENOUS at 10:07

## 2024-01-01 RX ADMIN — CLONAZEPAM 0.5 MG: 0.5 TABLET ORAL at 23:40

## 2024-01-01 RX ADMIN — SODIUM CHLORIDE, PRESERVATIVE FREE 10 ML: 5 INJECTION INTRAVENOUS at 21:25

## 2024-01-01 RX ADMIN — LORAZEPAM 1 MG: 2 INJECTION INTRAMUSCULAR; INTRAVENOUS at 13:19

## 2024-01-01 RX ADMIN — AMIODARONE HYDROCHLORIDE 200 MG: 200 TABLET ORAL at 08:23

## 2024-01-01 RX ADMIN — DEXMEDETOMIDINE HYDROCHLORIDE 0.2 MCG/KG/HR: 400 INJECTION, SOLUTION INTRAVENOUS at 01:46

## 2024-01-01 RX ADMIN — HYDROMORPHONE HYDROCHLORIDE 2 MG: 0.5 INJECTION, SOLUTION INTRAMUSCULAR; INTRAVENOUS; SUBCUTANEOUS at 18:25

## 2024-01-01 RX ADMIN — ALPRAZOLAM 0.5 MG: 0.5 TABLET ORAL at 17:21

## 2024-01-01 RX ADMIN — LORAZEPAM 1 MG: 2 INJECTION INTRAMUSCULAR; INTRAVENOUS at 03:16

## 2024-01-01 RX ADMIN — HYDROMORPHONE HYDROCHLORIDE 1 MG: 1 INJECTION, SOLUTION INTRAMUSCULAR; INTRAVENOUS; SUBCUTANEOUS at 12:48

## 2024-01-01 RX ADMIN — COLCHICINE 0.6 MG: 0.6 TABLET, FILM COATED ORAL at 08:23

## 2024-01-01 RX ADMIN — ALPRAZOLAM 0.5 MG: 0.5 TABLET ORAL at 21:02

## 2024-01-01 RX ADMIN — HYDROMORPHONE HYDROCHLORIDE 0.5 MG: 1 INJECTION, SOLUTION INTRAMUSCULAR; INTRAVENOUS; SUBCUTANEOUS at 05:50

## 2024-01-01 RX ADMIN — CLONAZEPAM 0.5 MG: 0.5 TABLET ORAL at 11:50

## 2024-01-01 RX ADMIN — HYDROMORPHONE HYDROCHLORIDE 0.5 MG: 1 INJECTION, SOLUTION INTRAMUSCULAR; INTRAVENOUS; SUBCUTANEOUS at 23:40

## 2024-01-01 RX ADMIN — HYDROMORPHONE HYDROCHLORIDE 0.5 MG: 1 INJECTION, SOLUTION INTRAMUSCULAR; INTRAVENOUS; SUBCUTANEOUS at 23:25

## 2024-01-01 RX ADMIN — ENOXAPARIN SODIUM 40 MG: 100 INJECTION SUBCUTANEOUS at 21:26

## 2024-01-01 RX ADMIN — LORAZEPAM 1 MG: 2 INJECTION INTRAMUSCULAR; INTRAVENOUS at 12:12

## 2024-01-01 RX ADMIN — ASPIRIN 81 MG: 81 TABLET, COATED ORAL at 20:56

## 2024-01-01 RX ADMIN — IPRATROPIUM BROMIDE AND ALBUTEROL SULFATE 1 DOSE: .5; 3 SOLUTION RESPIRATORY (INHALATION) at 21:17

## 2024-01-01 RX ADMIN — HYDROMORPHONE HYDROCHLORIDE 0.5 MG: 1 INJECTION, SOLUTION INTRAMUSCULAR; INTRAVENOUS; SUBCUTANEOUS at 08:23

## 2024-01-01 RX ADMIN — GLYCOPYRROLATE 0.2 MG: 0.2 INJECTION INTRAMUSCULAR; INTRAVENOUS at 17:40

## 2024-01-01 RX ADMIN — HYDROMORPHONE HYDROCHLORIDE 0.5 MG: 1 INJECTION, SOLUTION INTRAMUSCULAR; INTRAVENOUS; SUBCUTANEOUS at 18:14

## 2024-01-01 RX ADMIN — HYDROMORPHONE HYDROCHLORIDE 0.5 MG: 1 INJECTION, SOLUTION INTRAMUSCULAR; INTRAVENOUS; SUBCUTANEOUS at 04:09

## 2024-01-01 RX ADMIN — LORAZEPAM 1 MG: 1 TABLET ORAL at 21:51

## 2024-01-01 RX ADMIN — SODIUM CHLORIDE, PRESERVATIVE FREE 10 ML: 5 INJECTION INTRAVENOUS at 20:56

## 2024-01-01 RX ADMIN — IPRATROPIUM BROMIDE AND ALBUTEROL SULFATE 1 DOSE: .5; 3 SOLUTION RESPIRATORY (INHALATION) at 11:48

## 2024-01-01 RX ADMIN — HYDROMORPHONE HYDROCHLORIDE 0.5 MG: 1 INJECTION, SOLUTION INTRAMUSCULAR; INTRAVENOUS; SUBCUTANEOUS at 22:52

## 2024-01-01 RX ADMIN — HYDROMORPHONE HYDROCHLORIDE 0.5 MG: 1 INJECTION, SOLUTION INTRAMUSCULAR; INTRAVENOUS; SUBCUTANEOUS at 20:51

## 2024-01-01 RX ADMIN — HYDROMORPHONE HYDROCHLORIDE 0.5 MG: 1 INJECTION, SOLUTION INTRAMUSCULAR; INTRAVENOUS; SUBCUTANEOUS at 10:07

## 2024-01-01 RX ADMIN — ASPIRIN 650 MG: 325 TABLET, COATED ORAL at 11:43

## 2024-01-01 RX ADMIN — HYDROMORPHONE HYDROCHLORIDE 2 MG: 0.5 INJECTION, SOLUTION INTRAMUSCULAR; INTRAVENOUS; SUBCUTANEOUS at 14:17

## 2024-01-01 RX ADMIN — QUETIAPINE FUMARATE 25 MG: 25 TABLET ORAL at 17:59

## 2024-01-01 RX ADMIN — IPRATROPIUM BROMIDE AND ALBUTEROL SULFATE 1 DOSE: .5; 3 SOLUTION RESPIRATORY (INHALATION) at 08:10

## 2024-01-01 RX ADMIN — FAMOTIDINE 20 MG: 20 TABLET, FILM COATED ORAL at 11:43

## 2024-01-01 RX ADMIN — HYDROMORPHONE HYDROCHLORIDE 0.5 MG: 1 INJECTION, SOLUTION INTRAMUSCULAR; INTRAVENOUS; SUBCUTANEOUS at 10:18

## 2024-01-01 RX ADMIN — LORAZEPAM 0.5 MG: 2 INJECTION INTRAMUSCULAR; INTRAVENOUS at 05:04

## 2024-01-01 RX ADMIN — HYDROMORPHONE HYDROCHLORIDE 0.5 MG: 1 INJECTION, SOLUTION INTRAMUSCULAR; INTRAVENOUS; SUBCUTANEOUS at 12:03

## 2024-01-01 RX ADMIN — IOPAMIDOL 100 ML: 755 INJECTION, SOLUTION INTRAVENOUS at 13:56

## 2024-01-01 RX ADMIN — CLONAZEPAM 0.5 MG: 0.5 TABLET ORAL at 13:30

## 2024-01-01 RX ADMIN — SODIUM CHLORIDE, PRESERVATIVE FREE 10 ML: 5 INJECTION INTRAVENOUS at 09:15

## 2024-01-01 RX ADMIN — HYDROMORPHONE HYDROCHLORIDE 0.5 MG: 1 INJECTION, SOLUTION INTRAMUSCULAR; INTRAVENOUS; SUBCUTANEOUS at 03:15

## 2024-01-01 RX ADMIN — SODIUM CHLORIDE, PRESERVATIVE FREE 10 ML: 5 INJECTION INTRAVENOUS at 08:24

## 2024-01-01 RX ADMIN — IPRATROPIUM BROMIDE AND ALBUTEROL SULFATE 1 DOSE: .5; 3 SOLUTION RESPIRATORY (INHALATION) at 16:28

## 2024-01-01 RX ADMIN — FUROSEMIDE 20 MG: 10 INJECTION, SOLUTION INTRAMUSCULAR; INTRAVENOUS at 14:11

## 2024-01-01 RX ADMIN — FAMOTIDINE 20 MG: 20 TABLET, FILM COATED ORAL at 20:56

## 2024-01-01 RX ADMIN — WATER 1000 MG: 1 INJECTION INTRAMUSCULAR; INTRAVENOUS; SUBCUTANEOUS at 10:45

## 2024-01-01 RX ADMIN — SODIUM CHLORIDE, PRESERVATIVE FREE 10 ML: 5 INJECTION INTRAVENOUS at 20:55

## 2024-01-01 RX ADMIN — SODIUM CHLORIDE, PRESERVATIVE FREE 10 ML: 5 INJECTION INTRAVENOUS at 11:50

## 2024-01-01 RX ADMIN — ALPRAZOLAM 0.5 MG: 0.5 TABLET ORAL at 06:30

## 2024-01-01 RX ADMIN — HYDROMORPHONE HYDROCHLORIDE 0.5 MG: 1 INJECTION, SOLUTION INTRAMUSCULAR; INTRAVENOUS; SUBCUTANEOUS at 14:33

## 2024-01-01 RX ADMIN — COLCHICINE 0.6 MG: 0.6 TABLET, FILM COATED ORAL at 11:43

## 2024-01-01 RX ADMIN — LORAZEPAM 1 MG: 2 INJECTION INTRAMUSCULAR; INTRAVENOUS at 14:17

## 2024-01-01 RX ADMIN — ENOXAPARIN SODIUM 40 MG: 100 INJECTION SUBCUTANEOUS at 20:56

## 2024-01-01 RX ADMIN — DEXMEDETOMIDINE HYDROCHLORIDE 0.2 MCG/KG/HR: 400 INJECTION, SOLUTION INTRAVENOUS at 21:23

## 2024-01-01 RX ADMIN — COLCHICINE 0.6 MG: 0.6 TABLET, FILM COATED ORAL at 09:11

## 2024-01-01 RX ADMIN — ALBUTEROL SULFATE 2.5 MG: 2.5 SOLUTION RESPIRATORY (INHALATION) at 12:31

## 2024-01-01 RX ADMIN — GLYCOPYRROLATE 0.2 MG: 0.2 INJECTION INTRAMUSCULAR; INTRAVENOUS at 14:17

## 2024-01-01 ASSESSMENT — PAIN SCALES - GENERAL
PAINLEVEL_OUTOF10: 3
PAINLEVEL_OUTOF10: 6
PAINLEVEL_OUTOF10: 5
PAINLEVEL_OUTOF10: 4
PAINLEVEL_OUTOF10: 6
PAINLEVEL_OUTOF10: 6
PAINLEVEL_OUTOF10: 7
PAINLEVEL_OUTOF10: 7
PAINLEVEL_OUTOF10: 6
PAINLEVEL_OUTOF10: 0
PAINLEVEL_OUTOF10: 3
PAINLEVEL_OUTOF10: 0
PAINLEVEL_OUTOF10: 3
PAINLEVEL_OUTOF10: 0
PAINLEVEL_OUTOF10: 7
PAINLEVEL_OUTOF10: 0
PAINLEVEL_OUTOF10: 0
PAINLEVEL_OUTOF10: 6
PAINLEVEL_OUTOF10: 0
PAINLEVEL_OUTOF10: 2
PAINLEVEL_OUTOF10: 4

## 2024-01-01 ASSESSMENT — PAIN DESCRIPTION - DESCRIPTORS
DESCRIPTORS: ACHING
DESCRIPTORS: ACHING
DESCRIPTORS: BURNING
DESCRIPTORS: ACHING;SPASM
DESCRIPTORS: ACHING
DESCRIPTORS: ACHING;SPASM
DESCRIPTORS: ACHING
DESCRIPTORS: OTHER (COMMENT)
DESCRIPTORS: ACHING

## 2024-01-01 ASSESSMENT — PAIN DESCRIPTION - LOCATION
LOCATION: GENERALIZED
LOCATION: GENERALIZED;LEG
LOCATION: GENERALIZED
LOCATION: GENERALIZED
LOCATION: CHEST
LOCATION: LEG
LOCATION: GENERALIZED

## 2024-01-01 ASSESSMENT — PAIN DESCRIPTION - ORIENTATION
ORIENTATION: OTHER (COMMENT)
ORIENTATION: LEFT;MID
ORIENTATION: OTHER (COMMENT)

## 2024-01-01 ASSESSMENT — PAIN - FUNCTIONAL ASSESSMENT: PAIN_FUNCTIONAL_ASSESSMENT: PREVENTS OR INTERFERES WITH ALL ACTIVE AND SOME PASSIVE ACTIVITIES

## 2024-02-15 ENCOUNTER — OFFICE VISIT (OUTPATIENT)
Facility: CLINIC | Age: 74
End: 2024-02-15
Payer: MEDICARE

## 2024-02-15 VITALS
SYSTOLIC BLOOD PRESSURE: 122 MMHG | HEART RATE: 86 BPM | OXYGEN SATURATION: 100 % | HEIGHT: 62 IN | TEMPERATURE: 97.6 F | DIASTOLIC BLOOD PRESSURE: 73 MMHG | WEIGHT: 167 LBS | RESPIRATION RATE: 17 BRPM | BODY MASS INDEX: 30.73 KG/M2

## 2024-02-15 DIAGNOSIS — R63.4 UNINTENTIONAL WEIGHT LOSS: ICD-10-CM

## 2024-02-15 DIAGNOSIS — I10 ESSENTIAL HYPERTENSION WITH GOAL BLOOD PRESSURE LESS THAN 140/90: Primary | ICD-10-CM

## 2024-02-15 DIAGNOSIS — E66.9 OBESITY (BMI 30.0-34.9): ICD-10-CM

## 2024-02-15 DIAGNOSIS — R74.8 ELEVATED LIVER ENZYMES: ICD-10-CM

## 2024-02-15 LAB
ALBUMIN SERPL-MCNC: 3.8 G/DL (ref 3.5–5)
ALBUMIN/GLOB SERPL: 1 (ref 1.1–2.2)
ALP SERPL-CCNC: 135 U/L (ref 45–117)
ALT SERPL-CCNC: 16 U/L (ref 12–78)
AST SERPL-CCNC: 14 U/L (ref 15–37)
BILIRUB DIRECT SERPL-MCNC: 0.2 MG/DL (ref 0–0.2)
BILIRUB SERPL-MCNC: 0.5 MG/DL (ref 0.2–1)
GLOBULIN SER CALC-MCNC: 3.8 G/DL (ref 2–4)
PROT SERPL-MCNC: 7.6 G/DL (ref 6.4–8.2)

## 2024-02-15 PROCEDURE — 1090F PRES/ABSN URINE INCON ASSESS: CPT | Performed by: NURSE PRACTITIONER

## 2024-02-15 PROCEDURE — 3078F DIAST BP <80 MM HG: CPT | Performed by: NURSE PRACTITIONER

## 2024-02-15 PROCEDURE — 4004F PT TOBACCO SCREEN RCVD TLK: CPT | Performed by: NURSE PRACTITIONER

## 2024-02-15 PROCEDURE — 3017F COLORECTAL CA SCREEN DOC REV: CPT | Performed by: NURSE PRACTITIONER

## 2024-02-15 PROCEDURE — 1123F ACP DISCUSS/DSCN MKR DOCD: CPT | Performed by: NURSE PRACTITIONER

## 2024-02-15 PROCEDURE — 99214 OFFICE O/P EST MOD 30 MIN: CPT | Performed by: NURSE PRACTITIONER

## 2024-02-15 PROCEDURE — G8417 CALC BMI ABV UP PARAM F/U: HCPCS | Performed by: NURSE PRACTITIONER

## 2024-02-15 PROCEDURE — G8484 FLU IMMUNIZE NO ADMIN: HCPCS | Performed by: NURSE PRACTITIONER

## 2024-02-15 PROCEDURE — G8427 DOCREV CUR MEDS BY ELIG CLIN: HCPCS | Performed by: NURSE PRACTITIONER

## 2024-02-15 PROCEDURE — G8400 PT W/DXA NO RESULTS DOC: HCPCS | Performed by: NURSE PRACTITIONER

## 2024-02-15 PROCEDURE — 3074F SYST BP LT 130 MM HG: CPT | Performed by: NURSE PRACTITIONER

## 2024-02-15 NOTE — PROGRESS NOTES
Sandra Lerma 1950 is a 74 y.o. female, Established patient, here for evaluation of the following chief complaint(s):  Follow-up (HTN, wt check)      ASSESSMENT/PLAN:  Below is the assessment and plan developed based on review of pertinent history, physical exam, labs, studies, and medications.    1. Essential hypertension with goal blood pressure less than 140/90  Stable, continue caduet use.    2. Obesity (BMI 30.0-34.9)  Lost 9 more #, partially intentional but other issue is dental. Working on this now and has dental coverage.     3. Unintentional weight loss  Lost 9 # since last visit. Held on resuming Megace since dental issue is isolated as primary cause and pt will see dentist later this month.     4. Elevated liver enzymes  Progressively increasing, but started in 2020. Advised will monitor, if elevated again, will refer to [facility] otherwise start to follow NAFLD diet.   - Hepatic Function Panel; Future            Follow-up and Dispositions    Return in 3 months (on 5/15/2024) for OV- MWV, HTN, BMI, radha enz?.         Pt asked to complete follow by next visit: Call if any problems    SUBJECTIVE/OBJECTIVE:  HPI    Pt presents to f/u HTN and wt loss. Home BP running 130/70's. Taking meds as prescribed, but out megace. Denies side effects from medication. Feels better, having no pain with use of Le[employer] for 2 hrs each day. Will have teeth pulled 2/28 with appt with plan to get dentures. Having decreased appetite due to dentition.   BP Readings from Last 3 Encounters:   02/15/24 122/73   10/03/23 133/65   06/06/23 137/82         2/15/2024    10:59 AM 10/23/2023     2:09 PM 10/3/2023    12:25 PM 6/6/2023     3:50 PM 1/24/2023     9:40 AM 10/20/2022     8:49 AM 7/19/2022     9:12 AM   Weight Metrics   Weight 167 lb 176 lb 174 lb 8 oz 174 lb 179 lb 188 lb 177 lb   BMI (Calculated) 30.6 kg/m2 32.3 kg/m2 32 kg/m2 31.9 kg/m2 32.8 kg/m2 34.5 kg/m2 32.4 kg/m2         Review of Systems  Constitutional:

## 2024-05-07 RX ORDER — AMLODIPINE BESYLATE AND ATORVASTATIN CALCIUM 10; 80 MG/1; MG/1
1 TABLET, FILM COATED ORAL DAILY
Qty: 90 TABLET | Refills: 3 | Status: SHIPPED | OUTPATIENT
Start: 2024-05-07

## 2024-08-11 ENCOUNTER — APPOINTMENT (OUTPATIENT)
Facility: HOSPITAL | Age: 74
End: 2024-08-11
Payer: MEDICARE

## 2024-08-11 ENCOUNTER — HOSPITAL ENCOUNTER (INPATIENT)
Facility: HOSPITAL | Age: 74
LOS: 5 days | Discharge: HOME HEALTH | End: 2024-08-16
Attending: EMERGENCY MEDICINE | Admitting: STUDENT IN AN ORGANIZED HEALTH CARE EDUCATION/TRAINING PROGRAM
Payer: MEDICARE

## 2024-08-11 DIAGNOSIS — R06.02 SHORTNESS OF BREATH: ICD-10-CM

## 2024-08-11 DIAGNOSIS — R09.02 HYPOXIA: ICD-10-CM

## 2024-08-11 DIAGNOSIS — J44.1 COPD EXACERBATION (HCC): Primary | ICD-10-CM

## 2024-08-11 DIAGNOSIS — R68.89 SUSPECTED MALIGNANT NEOPLASM: ICD-10-CM

## 2024-08-11 PROBLEM — J44.9 COPD (CHRONIC OBSTRUCTIVE PULMONARY DISEASE) (HCC): Status: ACTIVE | Noted: 2024-08-11

## 2024-08-11 LAB
ALBUMIN SERPL-MCNC: 3.4 G/DL (ref 3.5–5)
ALBUMIN/GLOB SERPL: 0.7 (ref 1.1–2.2)
ALP SERPL-CCNC: 126 U/L (ref 45–117)
ALT SERPL-CCNC: 17 U/L (ref 12–78)
ANION GAP SERPL CALC-SCNC: 2 MMOL/L (ref 5–15)
AST SERPL-CCNC: 20 U/L (ref 15–37)
B PERT DNA SPEC QL NAA+PROBE: NOT DETECTED
BASOPHILS # BLD: 0 K/UL (ref 0–0.1)
BASOPHILS NFR BLD: 1 % (ref 0–1)
BILIRUB SERPL-MCNC: 0.5 MG/DL (ref 0.2–1)
BORDETELLA PARAPERTUSSIS BY PCR: NOT DETECTED
BUN SERPL-MCNC: 7 MG/DL (ref 6–20)
BUN/CREAT SERPL: 9 (ref 12–20)
C PNEUM DNA SPEC QL NAA+PROBE: NOT DETECTED
CALCIUM SERPL-MCNC: 10.9 MG/DL (ref 8.5–10.1)
CHLORIDE SERPL-SCNC: 96 MMOL/L (ref 97–108)
CO2 SERPL-SCNC: 39 MMOL/L (ref 21–32)
COMMENT:: NORMAL
CREAT SERPL-MCNC: 0.77 MG/DL (ref 0.55–1.02)
DIFFERENTIAL METHOD BLD: ABNORMAL
EOSINOPHIL # BLD: 0.1 K/UL (ref 0–0.4)
EOSINOPHIL NFR BLD: 1 % (ref 0–7)
ERYTHROCYTE [DISTWIDTH] IN BLOOD BY AUTOMATED COUNT: 15.9 % (ref 11.5–14.5)
FLUAV SUBTYP SPEC NAA+PROBE: NOT DETECTED
FLUBV RNA SPEC QL NAA+PROBE: NOT DETECTED
GLOBULIN SER CALC-MCNC: 5.1 G/DL (ref 2–4)
GLUCOSE SERPL-MCNC: 116 MG/DL (ref 65–100)
HADV DNA SPEC QL NAA+PROBE: NOT DETECTED
HCOV 229E RNA SPEC QL NAA+PROBE: NOT DETECTED
HCOV HKU1 RNA SPEC QL NAA+PROBE: NOT DETECTED
HCOV NL63 RNA SPEC QL NAA+PROBE: NOT DETECTED
HCOV OC43 RNA SPEC QL NAA+PROBE: NOT DETECTED
HCT VFR BLD AUTO: 38.9 % (ref 35–47)
HGB BLD-MCNC: 12 G/DL (ref 11.5–16)
HMPV RNA SPEC QL NAA+PROBE: NOT DETECTED
HPIV1 RNA SPEC QL NAA+PROBE: NOT DETECTED
HPIV2 RNA SPEC QL NAA+PROBE: NOT DETECTED
HPIV3 RNA SPEC QL NAA+PROBE: NOT DETECTED
HPIV4 RNA SPEC QL NAA+PROBE: NOT DETECTED
IMM GRANULOCYTES # BLD AUTO: 0 K/UL (ref 0–0.04)
IMM GRANULOCYTES NFR BLD AUTO: 0 % (ref 0–0.5)
LYMPHOCYTES # BLD: 1.3 K/UL (ref 0.8–3.5)
LYMPHOCYTES NFR BLD: 22 % (ref 12–49)
M PNEUMO DNA SPEC QL NAA+PROBE: NOT DETECTED
MCH RBC QN AUTO: 26.9 PG (ref 26–34)
MCHC RBC AUTO-ENTMCNC: 30.8 G/DL (ref 30–36.5)
MCV RBC AUTO: 87.2 FL (ref 80–99)
MONOCYTES # BLD: 0.4 K/UL (ref 0–1)
MONOCYTES NFR BLD: 7 % (ref 5–13)
NEUTS SEG # BLD: 4.1 K/UL (ref 1.8–8)
NEUTS SEG NFR BLD: 69 % (ref 32–75)
NRBC # BLD: 0 K/UL (ref 0–0.01)
NRBC BLD-RTO: 0 PER 100 WBC
NT PRO BNP: 211 PG/ML
PLATELET # BLD AUTO: 348 K/UL (ref 150–400)
PMV BLD AUTO: 11 FL (ref 8.9–12.9)
POTASSIUM SERPL-SCNC: 3.1 MMOL/L (ref 3.5–5.1)
PROCALCITONIN SERPL-MCNC: <0.05 NG/ML
PROT SERPL-MCNC: 8.5 G/DL (ref 6.4–8.2)
RBC # BLD AUTO: 4.46 M/UL (ref 3.8–5.2)
RSV RNA SPEC QL NAA+PROBE: NOT DETECTED
RV+EV RNA SPEC QL NAA+PROBE: NOT DETECTED
SARS-COV-2 RNA RESP QL NAA+PROBE: NOT DETECTED
SODIUM SERPL-SCNC: 137 MMOL/L (ref 136–145)
SPECIMEN HOLD: NORMAL
TROPONIN I SERPL HS-MCNC: 6 NG/L (ref 0–51)
WBC # BLD AUTO: 5.9 K/UL (ref 3.6–11)

## 2024-08-11 PROCEDURE — 94640 AIRWAY INHALATION TREATMENT: CPT

## 2024-08-11 PROCEDURE — 1100000000 HC RM PRIVATE

## 2024-08-11 PROCEDURE — 85025 COMPLETE CBC W/AUTO DIFF WBC: CPT

## 2024-08-11 PROCEDURE — 99285 EMERGENCY DEPT VISIT HI MDM: CPT

## 2024-08-11 PROCEDURE — 80053 COMPREHEN METABOLIC PANEL: CPT

## 2024-08-11 PROCEDURE — 71045 X-RAY EXAM CHEST 1 VIEW: CPT

## 2024-08-11 PROCEDURE — 83880 ASSAY OF NATRIURETIC PEPTIDE: CPT

## 2024-08-11 PROCEDURE — 6360000002 HC RX W HCPCS: Performed by: STUDENT IN AN ORGANIZED HEALTH CARE EDUCATION/TRAINING PROGRAM

## 2024-08-11 PROCEDURE — 6360000004 HC RX CONTRAST MEDICATION: Performed by: INTERNAL MEDICINE

## 2024-08-11 PROCEDURE — 36415 COLL VENOUS BLD VENIPUNCTURE: CPT

## 2024-08-11 PROCEDURE — 2500000003 HC RX 250 WO HCPCS: Performed by: EMERGENCY MEDICINE

## 2024-08-11 PROCEDURE — 71275 CT ANGIOGRAPHY CHEST: CPT

## 2024-08-11 PROCEDURE — 2580000003 HC RX 258: Performed by: EMERGENCY MEDICINE

## 2024-08-11 PROCEDURE — 84484 ASSAY OF TROPONIN QUANT: CPT

## 2024-08-11 PROCEDURE — 96365 THER/PROPH/DIAG IV INF INIT: CPT

## 2024-08-11 PROCEDURE — 6370000000 HC RX 637 (ALT 250 FOR IP): Performed by: EMERGENCY MEDICINE

## 2024-08-11 PROCEDURE — 6370000000 HC RX 637 (ALT 250 FOR IP): Performed by: STUDENT IN AN ORGANIZED HEALTH CARE EDUCATION/TRAINING PROGRAM

## 2024-08-11 PROCEDURE — 0202U NFCT DS 22 TRGT SARS-COV-2: CPT

## 2024-08-11 PROCEDURE — 93005 ELECTROCARDIOGRAM TRACING: CPT

## 2024-08-11 PROCEDURE — 2580000003 HC RX 258: Performed by: STUDENT IN AN ORGANIZED HEALTH CARE EDUCATION/TRAINING PROGRAM

## 2024-08-11 PROCEDURE — 84145 PROCALCITONIN (PCT): CPT

## 2024-08-11 RX ORDER — GUAIFENESIN 600 MG/1
600 TABLET, EXTENDED RELEASE ORAL 2 TIMES DAILY
Status: DISCONTINUED | OUTPATIENT
Start: 2024-08-11 | End: 2024-08-16 | Stop reason: HOSPADM

## 2024-08-11 RX ORDER — MIRTAZAPINE 15 MG/1
15 TABLET, FILM COATED ORAL NIGHTLY
Status: DISCONTINUED | OUTPATIENT
Start: 2024-08-11 | End: 2024-08-16 | Stop reason: HOSPADM

## 2024-08-11 RX ORDER — ONDANSETRON 2 MG/ML
4 INJECTION INTRAMUSCULAR; INTRAVENOUS EVERY 6 HOURS PRN
Status: DISCONTINUED | OUTPATIENT
Start: 2024-08-11 | End: 2024-08-16 | Stop reason: HOSPADM

## 2024-08-11 RX ORDER — MAGNESIUM SULFATE IN WATER 40 MG/ML
2000 INJECTION, SOLUTION INTRAVENOUS PRN
Status: DISCONTINUED | OUTPATIENT
Start: 2024-08-11 | End: 2024-08-16 | Stop reason: HOSPADM

## 2024-08-11 RX ORDER — SODIUM CHLORIDE 9 MG/ML
INJECTION, SOLUTION INTRAVENOUS PRN
Status: DISCONTINUED | OUTPATIENT
Start: 2024-08-11 | End: 2024-08-16 | Stop reason: HOSPADM

## 2024-08-11 RX ORDER — PREDNISONE 20 MG/1
40 TABLET ORAL
Status: COMPLETED | OUTPATIENT
Start: 2024-08-11 | End: 2024-08-11

## 2024-08-11 RX ORDER — IPRATROPIUM BROMIDE AND ALBUTEROL SULFATE 2.5; .5 MG/3ML; MG/3ML
1 SOLUTION RESPIRATORY (INHALATION)
Status: DISPENSED | OUTPATIENT
Start: 2024-08-12 | End: 2024-08-12

## 2024-08-11 RX ORDER — PREDNISONE 20 MG/1
60 TABLET ORAL DAILY
Status: DISCONTINUED | OUTPATIENT
Start: 2024-08-12 | End: 2024-08-16

## 2024-08-11 RX ORDER — POTASSIUM CHLORIDE 7.45 MG/ML
10 INJECTION INTRAVENOUS PRN
Status: DISCONTINUED | OUTPATIENT
Start: 2024-08-11 | End: 2024-08-16 | Stop reason: HOSPADM

## 2024-08-11 RX ORDER — ACETAMINOPHEN 650 MG/1
650 SUPPOSITORY RECTAL EVERY 6 HOURS PRN
Status: DISCONTINUED | OUTPATIENT
Start: 2024-08-11 | End: 2024-08-16 | Stop reason: HOSPADM

## 2024-08-11 RX ORDER — ACETAMINOPHEN 325 MG/1
650 TABLET ORAL EVERY 6 HOURS PRN
Status: DISCONTINUED | OUTPATIENT
Start: 2024-08-11 | End: 2024-08-16 | Stop reason: HOSPADM

## 2024-08-11 RX ORDER — IPRATROPIUM BROMIDE AND ALBUTEROL SULFATE 2.5; .5 MG/3ML; MG/3ML
1 SOLUTION RESPIRATORY (INHALATION)
Status: COMPLETED | OUTPATIENT
Start: 2024-08-11 | End: 2024-08-11

## 2024-08-11 RX ORDER — FAMOTIDINE 20 MG/1
40 TABLET, FILM COATED ORAL 2 TIMES DAILY
Status: DISCONTINUED | OUTPATIENT
Start: 2024-08-11 | End: 2024-08-16 | Stop reason: HOSPADM

## 2024-08-11 RX ORDER — POTASSIUM CHLORIDE 750 MG/1
40 TABLET, FILM COATED, EXTENDED RELEASE ORAL PRN
Status: DISCONTINUED | OUTPATIENT
Start: 2024-08-11 | End: 2024-08-16 | Stop reason: HOSPADM

## 2024-08-11 RX ORDER — PAROXETINE HYDROCHLORIDE 20 MG/1
10 TABLET, FILM COATED ORAL DAILY
Status: DISCONTINUED | OUTPATIENT
Start: 2024-08-12 | End: 2024-08-16 | Stop reason: HOSPADM

## 2024-08-11 RX ORDER — SODIUM CHLORIDE 0.9 % (FLUSH) 0.9 %
5-40 SYRINGE (ML) INJECTION EVERY 12 HOURS SCHEDULED
Status: DISCONTINUED | OUTPATIENT
Start: 2024-08-11 | End: 2024-08-16 | Stop reason: HOSPADM

## 2024-08-11 RX ORDER — SODIUM CHLORIDE 0.9 % (FLUSH) 0.9 %
5-40 SYRINGE (ML) INJECTION PRN
Status: DISCONTINUED | OUTPATIENT
Start: 2024-08-11 | End: 2024-08-16 | Stop reason: HOSPADM

## 2024-08-11 RX ORDER — POLYETHYLENE GLYCOL 3350 17 G/17G
17 POWDER, FOR SOLUTION ORAL DAILY PRN
Status: DISCONTINUED | OUTPATIENT
Start: 2024-08-11 | End: 2024-08-16 | Stop reason: HOSPADM

## 2024-08-11 RX ORDER — AMLODIPINE BESYLATE 5 MG/1
10 TABLET ORAL DAILY
Status: DISCONTINUED | OUTPATIENT
Start: 2024-08-12 | End: 2024-08-16 | Stop reason: HOSPADM

## 2024-08-11 RX ORDER — TRAZODONE HYDROCHLORIDE 100 MG/1
100 TABLET ORAL NIGHTLY
Status: DISCONTINUED | OUTPATIENT
Start: 2024-08-11 | End: 2024-08-16 | Stop reason: HOSPADM

## 2024-08-11 RX ORDER — AMLODIPINE BESYLATE AND ATORVASTATIN CALCIUM 10; 80 MG/1; MG/1
1 TABLET, FILM COATED ORAL DAILY
Status: DISCONTINUED | OUTPATIENT
Start: 2024-08-12 | End: 2024-08-11 | Stop reason: SDUPTHER

## 2024-08-11 RX ORDER — ATORVASTATIN CALCIUM 40 MG/1
80 TABLET, FILM COATED ORAL DAILY
Status: DISCONTINUED | OUTPATIENT
Start: 2024-08-12 | End: 2024-08-16 | Stop reason: HOSPADM

## 2024-08-11 RX ORDER — SODIUM CHLORIDE 9 MG/ML
INJECTION, SOLUTION INTRAVENOUS CONTINUOUS
Status: DISPENSED | OUTPATIENT
Start: 2024-08-11 | End: 2024-08-13

## 2024-08-11 RX ORDER — ONDANSETRON 4 MG/1
4 TABLET, ORALLY DISINTEGRATING ORAL EVERY 8 HOURS PRN
Status: DISCONTINUED | OUTPATIENT
Start: 2024-08-11 | End: 2024-08-16 | Stop reason: HOSPADM

## 2024-08-11 RX ADMIN — IOPAMIDOL 70 ML: 755 INJECTION, SOLUTION INTRAVENOUS at 21:02

## 2024-08-11 RX ADMIN — PIPERACILLIN AND TAZOBACTAM 4500 MG: 4; .5 INJECTION, POWDER, LYOPHILIZED, FOR SOLUTION INTRAVENOUS at 23:39

## 2024-08-11 RX ADMIN — MIRTAZAPINE 15 MG: 15 TABLET, FILM COATED ORAL at 23:31

## 2024-08-11 RX ADMIN — FAMOTIDINE 40 MG: 20 TABLET, FILM COATED ORAL at 23:31

## 2024-08-11 RX ADMIN — GUAIFENESIN 600 MG: 600 TABLET, EXTENDED RELEASE ORAL at 23:31

## 2024-08-11 RX ADMIN — PREDNISONE 40 MG: 20 TABLET ORAL at 21:38

## 2024-08-11 RX ADMIN — ARFORMOTEROL TARTRATE: 15 SOLUTION RESPIRATORY (INHALATION) at 23:36

## 2024-08-11 RX ADMIN — TRAZODONE HYDROCHLORIDE 100 MG: 100 TABLET ORAL at 23:31

## 2024-08-11 RX ADMIN — IPRATROPIUM BROMIDE AND ALBUTEROL SULFATE 1 DOSE: .5; 3 SOLUTION RESPIRATORY (INHALATION) at 21:38

## 2024-08-11 RX ADMIN — DOXYCYCLINE 100 MG: 100 INJECTION, POWDER, LYOPHILIZED, FOR SOLUTION INTRAVENOUS at 21:43

## 2024-08-11 ASSESSMENT — PAIN - FUNCTIONAL ASSESSMENT: PAIN_FUNCTIONAL_ASSESSMENT: 0-10

## 2024-08-11 NOTE — ED NOTES
7:55 PM    Patient is an 74 y.o. female with history of anxiety, hypertension, current cigarette smoking, who presents to the ER with reports of SOB, bilateral lower extremity edema, and insomnia. Patient reports no history of cardiac or lung disease. Patient reports cough.     Saturations 86-89% and placed on 2L.    I have evaluated the patient as the Provider in Rapid Medical Evaluation (RME). I have reviewed her vital signs and the triage nurse assessment. I have talked with the patient and any available family and advised that I am the provider in triage and have ordered the appropriate study to initiate their work up based on the clinical presentation during my assessment. I have advised that the patient will be accommodated in the Main ED as soon as possible. I have also requested to contact the triage nurse or myself immediately if the patient experiences any changes in their condition during this brief waiting period.    BBO Ahmadi Reagan, PA-C  08/11/24 1957

## 2024-08-11 NOTE — ED TRIAGE NOTES
PT ambulatory to triage co feet swelling, difficulty sleeping, SOB and cough progressively worsening since July. Pt denies any cardiac hx.  Admits to smoking cigarettes. Pt satting 86% in triage. Placed on 2L NC satting 93-95%     PMH Anxiety, HTN

## 2024-08-12 LAB
ANION GAP SERPL CALC-SCNC: 2 MMOL/L (ref 5–15)
BASOPHILS # BLD: 0 K/UL (ref 0–0.1)
BASOPHILS NFR BLD: 0 % (ref 0–1)
BUN SERPL-MCNC: 7 MG/DL (ref 6–20)
BUN/CREAT SERPL: 10 (ref 12–20)
CALCIUM SERPL-MCNC: 9.9 MG/DL (ref 8.5–10.1)
CHLORIDE SERPL-SCNC: 100 MMOL/L (ref 97–108)
CO2 SERPL-SCNC: 38 MMOL/L (ref 21–32)
CREAT SERPL-MCNC: 0.71 MG/DL (ref 0.55–1.02)
DIFFERENTIAL METHOD BLD: ABNORMAL
EKG ATRIAL RATE: 89 BPM
EKG DIAGNOSIS: NORMAL
EKG P AXIS: 85 DEGREES
EKG P-R INTERVAL: 172 MS
EKG Q-T INTERVAL: 386 MS
EKG QRS DURATION: 94 MS
EKG QTC CALCULATION (BAZETT): 469 MS
EKG R AXIS: -26 DEGREES
EKG T AXIS: 85 DEGREES
EKG VENTRICULAR RATE: 89 BPM
EOSINOPHIL # BLD: 0 K/UL (ref 0–0.4)
EOSINOPHIL NFR BLD: 0 % (ref 0–7)
ERYTHROCYTE [DISTWIDTH] IN BLOOD BY AUTOMATED COUNT: 15.8 % (ref 11.5–14.5)
GLUCOSE SERPL-MCNC: 143 MG/DL (ref 65–100)
HCT VFR BLD AUTO: 37 % (ref 35–47)
HGB BLD-MCNC: 11.1 G/DL (ref 11.5–16)
IMM GRANULOCYTES # BLD AUTO: 0 K/UL (ref 0–0.04)
IMM GRANULOCYTES NFR BLD AUTO: 0 % (ref 0–0.5)
LYMPHOCYTES # BLD: 0.4 K/UL (ref 0.8–3.5)
LYMPHOCYTES NFR BLD: 9 % (ref 12–49)
MCH RBC QN AUTO: 26.2 PG (ref 26–34)
MCHC RBC AUTO-ENTMCNC: 30 G/DL (ref 30–36.5)
MCV RBC AUTO: 87.3 FL (ref 80–99)
MONOCYTES # BLD: 0.1 K/UL (ref 0–1)
MONOCYTES NFR BLD: 2 % (ref 5–13)
NEUTS SEG # BLD: 3.9 K/UL (ref 1.8–8)
NEUTS SEG NFR BLD: 89 % (ref 32–75)
NRBC # BLD: 0 K/UL (ref 0–0.01)
NRBC BLD-RTO: 0 PER 100 WBC
PLATELET # BLD AUTO: 301 K/UL (ref 150–400)
PMV BLD AUTO: 11 FL (ref 8.9–12.9)
POTASSIUM SERPL-SCNC: 3.7 MMOL/L (ref 3.5–5.1)
RBC # BLD AUTO: 4.24 M/UL (ref 3.8–5.2)
RBC MORPH BLD: ABNORMAL
SODIUM SERPL-SCNC: 140 MMOL/L (ref 136–145)
WBC # BLD AUTO: 4.4 K/UL (ref 3.6–11)

## 2024-08-12 PROCEDURE — 2500000003 HC RX 250 WO HCPCS: Performed by: STUDENT IN AN ORGANIZED HEALTH CARE EDUCATION/TRAINING PROGRAM

## 2024-08-12 PROCEDURE — 6370000000 HC RX 637 (ALT 250 FOR IP): Performed by: STUDENT IN AN ORGANIZED HEALTH CARE EDUCATION/TRAINING PROGRAM

## 2024-08-12 PROCEDURE — 80048 BASIC METABOLIC PNL TOTAL CA: CPT

## 2024-08-12 PROCEDURE — 1100000000 HC RM PRIVATE

## 2024-08-12 PROCEDURE — 97161 PT EVAL LOW COMPLEX 20 MIN: CPT

## 2024-08-12 PROCEDURE — 94640 AIRWAY INHALATION TREATMENT: CPT

## 2024-08-12 PROCEDURE — 2700000000 HC OXYGEN THERAPY PER DAY

## 2024-08-12 PROCEDURE — 85025 COMPLETE CBC W/AUTO DIFF WBC: CPT

## 2024-08-12 PROCEDURE — 6360000002 HC RX W HCPCS: Performed by: STUDENT IN AN ORGANIZED HEALTH CARE EDUCATION/TRAINING PROGRAM

## 2024-08-12 PROCEDURE — 93010 ELECTROCARDIOGRAM REPORT: CPT | Performed by: INTERNAL MEDICINE

## 2024-08-12 PROCEDURE — 94760 N-INVAS EAR/PLS OXIMETRY 1: CPT

## 2024-08-12 PROCEDURE — 97530 THERAPEUTIC ACTIVITIES: CPT

## 2024-08-12 PROCEDURE — 6370000000 HC RX 637 (ALT 250 FOR IP): Performed by: NURSE PRACTITIONER

## 2024-08-12 PROCEDURE — 6370000000 HC RX 637 (ALT 250 FOR IP)

## 2024-08-12 PROCEDURE — 2580000003 HC RX 258: Performed by: STUDENT IN AN ORGANIZED HEALTH CARE EDUCATION/TRAINING PROGRAM

## 2024-08-12 PROCEDURE — 36415 COLL VENOUS BLD VENIPUNCTURE: CPT

## 2024-08-12 PROCEDURE — 97165 OT EVAL LOW COMPLEX 30 MIN: CPT

## 2024-08-12 RX ORDER — DOCUSATE SODIUM 100 MG/1
100 CAPSULE, LIQUID FILLED ORAL
Status: DISCONTINUED | OUTPATIENT
Start: 2024-08-12 | End: 2024-08-16 | Stop reason: HOSPADM

## 2024-08-12 RX ORDER — BISACODYL 5 MG/1
5 TABLET, DELAYED RELEASE ORAL DAILY PRN
Status: DISCONTINUED | OUTPATIENT
Start: 2024-08-12 | End: 2024-08-16 | Stop reason: HOSPADM

## 2024-08-12 RX ORDER — NICOTINE 21 MG/24HR
1 PATCH, TRANSDERMAL 24 HOURS TRANSDERMAL DAILY
Status: DISCONTINUED | OUTPATIENT
Start: 2024-08-12 | End: 2024-08-16 | Stop reason: HOSPADM

## 2024-08-12 RX ADMIN — ARFORMOTEROL TARTRATE: 15 SOLUTION RESPIRATORY (INHALATION) at 20:29

## 2024-08-12 RX ADMIN — PAROXETINE HYDROCHLORIDE 10 MG: 20 TABLET, FILM COATED ORAL at 10:51

## 2024-08-12 RX ADMIN — FAMOTIDINE 40 MG: 20 TABLET, FILM COATED ORAL at 20:43

## 2024-08-12 RX ADMIN — IPRATROPIUM BROMIDE AND ALBUTEROL SULFATE 1 DOSE: 2.5; .5 SOLUTION RESPIRATORY (INHALATION) at 15:52

## 2024-08-12 RX ADMIN — PIPERACILLIN AND TAZOBACTAM 3375 MG: 3; .375 INJECTION, POWDER, LYOPHILIZED, FOR SOLUTION INTRAVENOUS at 23:47

## 2024-08-12 RX ADMIN — DOXYCYCLINE 100 MG: 100 INJECTION, POWDER, LYOPHILIZED, FOR SOLUTION INTRAVENOUS at 22:36

## 2024-08-12 RX ADMIN — GUAIFENESIN 600 MG: 600 TABLET, EXTENDED RELEASE ORAL at 10:49

## 2024-08-12 RX ADMIN — GUAIFENESIN 600 MG: 600 TABLET, EXTENDED RELEASE ORAL at 20:43

## 2024-08-12 RX ADMIN — ARFORMOTEROL TARTRATE: 15 SOLUTION RESPIRATORY (INHALATION) at 07:50

## 2024-08-12 RX ADMIN — SODIUM CHLORIDE: 9 INJECTION, SOLUTION INTRAVENOUS at 05:17

## 2024-08-12 RX ADMIN — PREDNISONE 60 MG: 20 TABLET ORAL at 10:51

## 2024-08-12 RX ADMIN — SODIUM CHLORIDE: 9 INJECTION, SOLUTION INTRAVENOUS at 07:15

## 2024-08-12 RX ADMIN — POLYETHYLENE GLYCOL 3350 17 G: 17 POWDER, FOR SOLUTION ORAL at 15:39

## 2024-08-12 RX ADMIN — SODIUM CHLORIDE: 900 INJECTION INTRAVENOUS at 15:27

## 2024-08-12 RX ADMIN — BISACODYL 5 MG: 5 TABLET, COATED ORAL at 15:39

## 2024-08-12 RX ADMIN — IPRATROPIUM BROMIDE AND ALBUTEROL SULFATE 1 DOSE: 2.5; .5 SOLUTION RESPIRATORY (INHALATION) at 12:08

## 2024-08-12 RX ADMIN — IPRATROPIUM BROMIDE AND ALBUTEROL SULFATE 1 DOSE: 2.5; .5 SOLUTION RESPIRATORY (INHALATION) at 07:50

## 2024-08-12 RX ADMIN — MIRTAZAPINE 15 MG: 15 TABLET, FILM COATED ORAL at 20:43

## 2024-08-12 RX ADMIN — SODIUM CHLORIDE, PRESERVATIVE FREE 10 ML: 5 INJECTION INTRAVENOUS at 08:00

## 2024-08-12 RX ADMIN — TRAZODONE HYDROCHLORIDE 100 MG: 100 TABLET ORAL at 20:43

## 2024-08-12 RX ADMIN — DOCUSATE SODIUM 100 MG: 100 CAPSULE, LIQUID FILLED ORAL at 20:43

## 2024-08-12 RX ADMIN — SODIUM CHLORIDE, PRESERVATIVE FREE 10 ML: 5 INJECTION INTRAVENOUS at 20:44

## 2024-08-12 RX ADMIN — PIPERACILLIN AND TAZOBACTAM 3375 MG: 3; .375 INJECTION, POWDER, LYOPHILIZED, FOR SOLUTION INTRAVENOUS at 15:29

## 2024-08-12 RX ADMIN — FAMOTIDINE 40 MG: 20 TABLET, FILM COATED ORAL at 10:50

## 2024-08-12 RX ADMIN — IPRATROPIUM BROMIDE AND ALBUTEROL SULFATE 1 DOSE: 2.5; .5 SOLUTION RESPIRATORY (INHALATION) at 20:29

## 2024-08-12 RX ADMIN — PIPERACILLIN AND TAZOBACTAM 3375 MG: 3; .375 INJECTION, POWDER, LYOPHILIZED, FOR SOLUTION INTRAVENOUS at 05:28

## 2024-08-12 RX ADMIN — IPRATROPIUM BROMIDE AND ALBUTEROL SULFATE 1 DOSE: 2.5; .5 SOLUTION RESPIRATORY (INHALATION) at 00:00

## 2024-08-12 RX ADMIN — ATORVASTATIN CALCIUM 80 MG: 40 TABLET, FILM COATED ORAL at 10:50

## 2024-08-12 NOTE — PLAN OF CARE
was combative when therapists attempted to transfer her toward EOB.  Formal assessment limited.  She did demonstrate active and purposeful movement in all extremities and was observed transferring supine<>long sit in order to adjust socks and covers but not on command.  VSS and nursing aware of current presentation.  Further mobility deferred at this time.  Recommending SNF upon discharge.      Patient will benefit from skilled intervention to address the above impairments.    Functional Outcome Measure:  The patient scored 6/24 on the [facility] outcome measure.           PLAN :  Recommendations and Planned Interventions:   bed mobility training, transfer training, gait training, therapeutic exercises, neuromuscular re-education, patient and family training/education, and therapeutic activities    Frequency/Duration: Patient will be followed by physical therapy to address goals, PT Plan of Care: 5 times/week to address goals.    Recommendation for discharge: (in order for the patient to meet his/her long term goals): Therapy up to 5 days/week in Skilled nursing facility    Other factors to consider for discharge: independent baseline, no command following, combative, actively resisting all movement    IF patient discharges home will need the following DME: continuing to assess with progress                SUBJECTIVE:   Patient stated “I'm cold.”    OBJECTIVE DATA SUMMARY:       Past Medical History:   Diagnosis Date    Anxiety     Arthritis     knees    High cholesterol     Hypertension     Psychiatric disorder     anxiety     Past Surgical History:   Procedure Laterality Date    COLONOSCOPY N/A 10/15/2019    COLONOSCOPY performed by Benito Zhu MD at [facility] ENDOSCOPY       Home Situation:  Social/Functional History  Lives With:  (cousin)  Type of Home: Apartment  Home Layout: One level  Home Access: Stairs to enter without rails  Entrance Stairs - Number of Steps: 1  Bathroom Shower/Tub: Tub/Shower unit  Bathroom  score ?171,2,3 had higher odds of discharging home with home health or need of SNF/IPR.    1. Janelle Mayers, Cristina Toledo, Braulio Canchola, Danay Gutierrez, Yared Maria, Nic Mayers.  Validity of the AM-PAC “6-Clicks” Inpatient Daily Activity and Basic Mobility Short Forms. Physical Therapy Mar 2014, 94 3) [phone]; DOI: 10.2522/ptj.[id number]  2. Hollis OBRIEN, Marie NOVA, Mahamed NOVA, Stacie NOVA. Association of AM-PAC \"6-Clicks\" Basic Mobility and Daily Activity Scores With Discharge Destination. Phys Ther. 2021 Apr 4;101(4):hhzq713. doi: 10.1093/ptj/chyo113. PMID: 20691463.  3. Maureen NOVA, Clare D, Hawa S, Kiersten K, Asad S. Activity Measure for Post-Acute Care \"6-Clicks\" Basic Mobility Scores Predict Discharge Destination After Acute Care Hospitalization in Select Patient Groups: A Retrospective, Observational Study. Arch Rehabil Res Clin Transl. 2022 Jul 16;4(3):047836. doi: 10.1016/j.arrct.2022.845825. PMID: 50213269; PMCID: [id number].  4. Lexi Raymundo S, Jeffrey W, Neela P. AM-PAC Short Forms Manual 4.0. Revised 2/2020.                                                                                                                                                                                                                                 Activity Tolerance:   Poor    After treatment:   Patient left in no apparent distress in bed, Call bell within reach, Bed/ chair alarm activated, and Side rails x3    COMMUNICATION/EDUCATION:   The patient's plan of care was discussed with: occupational therapist and registered nurse    Thank you for this referral.  Leslie Li, PT, DPT  Minutes: 20

## 2024-08-12 NOTE — PLAN OF CARE
Problem: Occupational Therapy - Adult  Goal: By Discharge: Performs self-care activities at highest level of function for planned discharge setting.  See evaluation for individualized goals.  Description: FUNCTIONAL STATUS PRIOR TO ADMISSION: Pt unable to provide PLOF/home set-up. Pt's daughter present and reported pt lives with son and was IND with ADLs and required assistance with IADLs.     HOME SUPPORT: Patient lived with son in apartment.    Occupational Therapy Goals:  Initiated 8/12/2024  1.  Patient will perform upper body dressing seated with Minimal Assist within 7 day(s).  2.  Patient will perform lower body bathing seated with Moderate Assist and AE PRN within 7 day(s).  3.  Patient will perform lower body dressing with Moderate Assist and AE PRN within 7 day(s).  4.  Patient will perform all aspects of toileting with Moderate Assist within 7 day(s).  5.  Patient will participate in upper extremity therapeutic exercise/activities with Stand by Assist for 5 minutes within 7 day(s).    6.  Patient will utilize energy conservation techniques during functional activities with verbal cues within 7 day(s).    Outcome: Not Progressing     OCCUPATIONAL THERAPY EVALUATION    Patient: Sandra Lerma (74 y.o. female)  Date: 8/12/2024  Primary Diagnosis: Shortness of breath [R06.02]  COPD (chronic obstructive pulmonary disease) (HCC) [J44.9]  Hypoxia [R09.02]  COPD exacerbation (HCC) [J44.1]  Suspected malignant neoplasm [R68.89]         Precautions: Fall Risk                  ASSESSMENT :  The patient is limited by decreased functional mobility, independence in ADLs, high-level IADLs, ROM, strength, body mechanics, activity tolerance, endurance, safety awareness, cognition, command following, attention/concentration, coordination, balance, posture.    Pt received semi-reclined in bed, resting, with supportive daughter at bedside. Pt very drowsy, required max verbal/tactile stimulation for arousal, and only  x3    COMMUNICATION/EDUCATION:   The patient's plan of care was discussed with: physical therapist and registered nurse    Patient Education  Education Given To: Patient;Family  Education Provided: Role of Therapy;Plan of Care  Education Method: Verbal  Barriers to Learning: Cognition  Education Outcome: Continued education needed    Thank you for this referral.  Blanche Ruff OT  Minutes: 18    Occupational Therapy Evaluation Charge Determination   History Examination Decision-Making   MEDIUM Complexity : Expanded review of history including physical, cognitive and psychocial history  MEDIUM Complexity: 3-5 Performance deficits relating to physical, cognitive, or psychosocial skills that result in activity limitations and/or participation restrictions LOW Complexity: No comorbidities that affect functional and  no verbal  or physical assist needed to complete eval tasks   Based on the above components, the patient evaluation is determined to be of the following complexity level: Low

## 2024-08-12 NOTE — PLAN OF CARE
Problem: Discharge Planning  Goal: Discharge to home or other facility with appropriate resources  8/12/2024 0543 by Tanisha Patel RN  Outcome: Progressing  8/12/2024 0543 by Tanisha Patel RN  Outcome: Progressing     Problem: Pain  Goal: Verbalizes/displays adequate comfort level or baseline comfort level  8/12/2024 0543 by Tanisha Patel RN  Outcome: Progressing  8/12/2024 0543 by Tanisha Patel RN  Outcome: Progressing

## 2024-08-12 NOTE — ED PROVIDER NOTES
[facility] EMERGENCY DEP  EMERGENCY DEPARTMENT ENCOUNTER      Pt Name: Sandra Lerma  MRN: [id number]  Birthdate 1950  Date of evaluation: 8/11/2024  Provider: Bryan Rocha MD      HISTORY OF PRESENT ILLNESS      74-year-old female who reports a history of anxiety and hypertension presents to the emergency department with a chief complaint of shortness of breath the last few weeks.  Her main concern to me is difficulty sleeping, she does not get more than a few hours of sleep at night.  She recently talked to her doctor about that.  She has been on trazodone but they also prescribed mirtazapine which she has not been taking.  She has been on alprazolam and they prescribed clonazepam instead.  She denies any history of pulmonary or cardiac disease but is a smoker.    The history is provided by the patient and medical records.           Nursing Notes were reviewed.    REVIEW OF SYSTEMS         Review of Systems        PAST MEDICAL HISTORY     Past Medical History:   Diagnosis Date    Anxiety     Arthritis     knees    High cholesterol     Hypertension     Psychiatric disorder     anxiety         SURGICAL HISTORY       Past Surgical History:   Procedure Laterality Date    COLONOSCOPY N/A 10/15/2019    COLONOSCOPY performed by Benito Zhu MD at hospitals ENDOSCOPY         CURRENT MEDICATIONS       Previous Medications    ALPRAZOLAM (XANAX) 0.5 MG TABLET        AMLODIPINE-ATORVASTATATIN (CADUET) 10-80 MG PER TABLET    TAKE ONE TABLET BY MOUTH DAILY    ASPIRIN 81 MG EC TABLET    Take 1 tablet by mouth daily    CALCIUM CARBONATE (TUMS) 500 MG CHEWABLE TABLET    CHEW 1 TABLET BY MOUTH TWICE A DAY    DICLOFENAC (VOLTAREN) 75 MG EC TABLET    TAKE 1 TABLET TWICE DAILY WITH FOOD    ERGOCALCIFEROL (ERGOCALCIFEROL) 1.25 MG (72254 UT) CAPSULE    Take 1 capsule by mouth every 7 days    FAMOTIDINE (PEPCID) 40 MG TABLET    Take 1 tablet by mouth daily    ICOSAPENT ETHYL (VASCEPA) 1 G CAPS CAPSULE    Take 2 capsules by  mouth 2 times daily    LIDOCAINE (LIDODERM) 5 %    Apply 1 patch to the affected area for 12 hours a day and remove for 12 hours a day.    LINACLOTIDE (LINZESS) 145 MCG CAPSULE    ceived the following from [facility]: Outside name: Linzess 145 mcg cap capsule    MIRTAZAPINE (REMERON) 45 MG TABLET    ceived the following from [facility]: Outside name: mirtazapine (REMERON) 45 mg tablet    PAROXETINE (PAXIL CR) 12.5 MG EXTENDED RELEASE TABLET        TRAZODONE (DESYREL) 50 MG TABLET    ceived the following from [facility]: Outside name: traZODone (DESYREL) 50 mg tablet       ALLERGIES     Citalopram and Fluoxetine    FAMILY HISTORY       Family History   Problem Relation Age of Onset    Hypertension Paternal Grandmother     Hypertension Maternal Grandfather     Hypertension Maternal Grandmother     Cancer Father         lung    Cancer Mother         throat    Hypertension Paternal Grandfather           SOCIAL HISTORY       Social History     Socioeconomic History    Marital status:    Tobacco Use    Smoking status: Some Days     Current packs/day: 0.00     Types: Cigarettes     Last attempt to quit: 2019     Years since quittin.1    Smokeless tobacco: Never   Substance and Sexual Activity    Alcohol use: No    Drug use: Never    Sexual activity: Not Currently     Social Determinants of Health     Financial Resource Strain: Low Risk  (2023)    Overall Financial Resource Strain (CARDIA)     Difficulty of Paying Living Expenses: Not hard at all   Transportation Needs: Unknown (2023)    PRAPARE - Transportation     Lack of Transportation (Non-Medical): No   Physical Activity: Sufficiently Active (10/3/2023)    Exercise Vital Sign     Days of Exercise per Week: 4 days     Minutes of Exercise per Session: 60 min   Housing Stability: Unknown (2023)    Housing Stability Vital Sign     Unstable Housing in the Last Year: No         PHYSICAL EXAM

## 2024-08-12 NOTE — PROGRESS NOTES
Clinical Pharmacy Note - Extended Infusion Piperacillin/Tazobactam Dosing    This patient has been ordered piperacillin/tazobactam at 3.375 g IV every 6 hours. P&T protocol allows automatic substitution to extended infusion piperacillin/tazobactam and dose adjustment based on indication and renal function (adjustment required if creatinine clearance < 20 mL/min).    Indication: PNA    CrCl: 58 mL/min    Per P&T protocol, the piperacillin/tazobactam dosing will be adjusted to 4.5 g x1 then 3.375 g IV every 8 hours (each dose will be infused over 4 hours).    Please call pharmacy with any questions.

## 2024-08-12 NOTE — PROGRESS NOTES
Hospitalist Progress Note  JIAN Vergara - NP  Answering service: [phone]        Date of Service:  2024  NAME:  Sandra Lerma  :  1950  MRN:  [id number]      Admission Summary:     Ms. Lerma is a 74 y.o. female with pmh of RA, MDD w/ MAHOGANY, htn, dyslipidemia who presented to the ED with complaints of non productive cough and sob. Family at bedside noticed increasing dyspnea with very minimal exertion that has been progressive and over the last 2 days PTA she has been noted to have a \"puffing like breathing effort\" with minimal activity.  Pt reportedly has also lost ~ 15 - 20 pounds unintentionally.  She is a daily smoker.  No recent travel or sick contacts.  In the ED,   oxygen spo2 was 86% on RA. CT chest showed mediastinal LAD, right pretracheal alan mass, scattered tree-in-bud nodules throughout the right lung as above, with associated areas of bronchial wall thickening and mucous plugging.     Interval history / Subjective:        Patient was seen and examined this morning, she was lying in bed with multiple family members present in no acute distress, cooperative and interactive with assessment.  Reports that she is usually not on oxygen at home.  During assessment, patient complained of left AC arm discomfort.  On examination, it appears as though IV has infiltrated, IV pump stopped into being clamped.  Nursing was made aware.    Assessment & Plan:     Multifocal Bronchopneumonia / COPD Exacerbation  - cont with zosyn and doxy  - procal <0.05  - continue prednisone 60mg daily  - duonebs q4  - brovana + pulmicort     Mediastinal LAD and Lung Mass  - concerning for malignancy  - thoracic surgery consult for ? biopsy     Hypokalemia  - resolved post replacement     MDD w/ MAHOGANY  - ont pta meds    Constipation  - start daily colace per pts request  - prn dulcolax     Code status: Full  Prophylaxis:

## 2024-08-12 NOTE — PROGRESS NOTES
Spiritual Health Assessment/Progress Note  [facility]    Initial Encounter,  , Adjustment to illness,      Name: Sandra Lerma MRN: [id number]    Age: 74 y.o.     Sex: female   Language: English   Hindu: Confucianist   COPD (chronic obstructive pulmonary disease) ([facility])     Date: 8/12/2024            Total Time Calculated: 15 min              Spiritual Assessment began in [facility] 5W1 ORTHO SPINE        Referral/Consult From: Family   Encounter Overview/Reason: Initial Encounter  Service Provided For: Patient    Lavern, Belief, Meaning:   Patient identifies as spiritual  Family/Friends identify as spiritual      Importance and Influence:  Patient has spiritual/personal beliefs that influence decisions regarding their health  Family/Friends have spiritual/personal beliefs that influence decisions regarding the patient's health    Community:  Patient is connected with a spiritual community  Family/Friends are connected with a spiritual community:    Assessment and Plan of Care:     Patient Interventions include: Facilitated expression of thoughts and feelings, Explored spiritual coping/struggle/distress and theological reflection, and Affirmed coping skills/support systems  Family/Friends Interventions include: Affirmed coping skills/support systems    Patient Plan of Care: Spiritual Care available upon further referral  Family/Friends Plan of Care: Spiritual Care available upon further referral    Electronically signed by BANDAR HUMPHRIES on 8/12/2024 at 1:12 PM

## 2024-08-12 NOTE — CARE COORDINATION
Care Management Initial Assessment       RUR:  11% Low Risk  Readmission? No  1st IM letter given? Yes - 8/12/2024  1st  letter given: No       08/12/24 1534   Service Assessment   Patient Orientation Alert and Oriented   Cognition Alert   History Provided By Patient   Primary Caregiver Self   Support Systems Children;Family Members;Friends/Neighbors   Patient's Healthcare Decision Maker is: Legal Next of Kin   PCP Verified by CM Yes   Last Visit to PCP Within last 3 months   Prior Functional Level Independent in ADLs/IADLs   Current Functional Level Independent in ADLs/IADLs   Can patient return to prior living arrangement Yes   Ability to make needs known: Good   Family able to assist with home care needs: Yes   Would you like for me to discuss the discharge plan with any other family members/significant others, and if so, who? Yes   Financial Resources Medicare   Social/Functional History   Lives With Family   Type of Home Apartment   Home Layout One level   Home Access Stairs to enter with rails   Entrance Stairs - Number of Steps 1   Entrance Stairs - Rails Right   Bathroom Shower/Tub Tub/Shower unit   Bathroom Toilet Standard   Bathroom Equipment Grab bars in shower   Bathroom Accessibility Wheelchair accessible   Home Equipment None   Receives Help From Family   ADL Assistance Independent   Homemaking Assistance Independent   Homemaking Responsibilities Yes   Meal Prep Responsibility Primary   Laundry Responsibility Primary   Cleaning Responsibility Primary   Bill Paying/Finance Responsibility Primary   Shopping Responsibility Primary   Health Care Management Primary   Ambulation Assistance Independent   Transfer Assistance Independent   Active  No   Patient's  Info Granddaughter, friend, daughter   Mode of Transportation Car   Education [employer]   Occupation Retired   Discharge Planning   Type of Residence Apartment   Living Arrangements Family Members   Current Services Prior To

## 2024-08-12 NOTE — H&P
History & Physical    Primary Care Provider: Valeria Min APRN - NP  Source of Information: Patient and chart review    History of Presenting Illness:   Sandra Lerma is a 74 y.o. female with pmh of RA, MDD w/ MAHOGANY, htn, dyslipidemia who presented to ed with complaints of cough and sob.  Family at bedside to bring patient to hospital state over the last month, they have noticed increasing dyspnea with very minimal exertion.  This has been progressive over the last 2 days she has been noted to have a puffing like breathing effort again which is minimal activity.  Her daughter at bedside reports over the last 2 to 3 months, she may have lost about 15 to 20 pounds unintentionally.  She is a daily smoker.  No recent travel or sick contacts.  Cough has been nonproductive.  The patient denies any fever, chills, chest or abdominal pain, nausea, vomiting,  congestion, recent illness, palpitations, or dysuria.    Remarkable vitals on ER Presentation: spo2-86% on RA  Labs Remarkable for: K-3.1, bnp 211  ER Images: mediastinal LAD, right pretracheal alan mass, . Scattered tree-in-bud nodules throughout the right lung as above, with associated areas of bronchial wall thickening and mucous plugging.  ER Rx: doxy iv, prednisone 40mg, duoneb     Review of Systems:  Pertinent items are noted in the History of Present Illness.     Past Medical History:   Diagnosis Date    Anxiety     Arthritis     knees    High cholesterol     Hypertension     Psychiatric disorder     anxiety      Past Surgical History:   Procedure Laterality Date    COLONOSCOPY N/A 10/15/2019    COLONOSCOPY performed by Benito Zhu MD at [facility] ENDOSCOPY     Prior to Admission medications    Medication Sig Start Date End Date Taking? Authorizing Provider   amLODIPine-atorvastatatin (CADUET) 10-80 MG per tablet TAKE ONE TABLET BY MOUTH DAILY 5/7/24   Valeria Min APRN - NP   calcium carbonate (TUMS) 500 MG chewable tablet CHEW 1 TABLET BY MOUTH TWICE  PM   Result Value Ref Range    WBC 5.9 3.6 - 11.0 K/uL    RBC 4.46 3.80 - 5.20 M/uL    Hemoglobin 12.0 11.5 - 16.0 g/dL    Hematocrit 38.9 35.0 - 47.0 %    MCV 87.2 80.0 - 99.0 FL    MCH 26.9 26.0 - 34.0 PG    MCHC 30.8 30.0 - 36.5 g/dL    RDW 15.9 (H) 11.5 - 14.5 %    Platelets 348 150 - 400 K/uL    MPV 11.0 8.9 - 12.9 FL    Nucleated RBCs 0.0 0  WBC    nRBC 0.00 0.00 - 0.01 K/uL    Neutrophils % 69 32 - 75 %    Lymphocytes % 22 12 - 49 %    Monocytes % 7 5 - 13 %    Eosinophils % 1 0 - 7 %    Basophils % 1 0 - 1 %    Immature Granulocytes % 0 0.0 - 0.5 %    Neutrophils Absolute 4.1 1.8 - 8.0 K/UL    Lymphocytes Absolute 1.3 0.8 - 3.5 K/UL    Monocytes Absolute 0.4 0.0 - 1.0 K/UL    Eosinophils Absolute 0.1 0.0 - 0.4 K/UL    Basophils Absolute 0.0 0.0 - 0.1 K/UL    Immature Granulocytes Absolute 0.0 0.00 - 0.04 K/UL    Differential Type AUTOMATED     Comprehensive Metabolic Panel    Collection Time: 08/11/24  8:10 PM   Result Value Ref Range    Sodium 137 136 - 145 mmol/L    Potassium 3.1 (L) 3.5 - 5.1 mmol/L    Chloride 96 (L) 97 - 108 mmol/L    CO2 39 (H) 21 - 32 mmol/L    Anion Gap 2 (L) 5 - 15 mmol/L    Glucose 116 (H) 65 - 100 mg/dL    BUN 7 6 - 20 MG/DL    Creatinine 0.77 0.55 - 1.02 MG/DL    BUN/Creatinine Ratio 9 (L) 12 - 20      Est, Glom Filt Rate 81 >60 ml/min/1.73m2    Calcium 10.9 (H) 8.5 - 10.1 MG/DL    Total Bilirubin 0.5 0.2 - 1.0 MG/DL    ALT 17 12 - 78 U/L    AST 20 15 - 37 U/L    Alk Phosphatase 126 (H) 45 - 117 U/L    Total Protein 8.5 (H) 6.4 - 8.2 g/dL    Albumin 3.4 (L) 3.5 - 5.0 g/dL    Globulin 5.1 (H) 2.0 - 4.0 g/dL    Albumin/Globulin Ratio 0.7 (L) 1.1 - 2.2     Brain Natriuretic Peptide    Collection Time: 08/11/24  8:10 PM   Result Value Ref Range    NT Pro- (H) <125 PG/ML   Troponin    Collection Time: 08/11/24  8:10 PM   Result Value Ref Range    Troponin, High Sensitivity 6 0 - 51 ng/L   Extra Tubes Hold    Collection Time: 08/11/24  8:10 PM   Result Value Ref Range

## 2024-08-12 NOTE — ED NOTES
ED TO INPATIENT SBAR HANDOFF    Patient Name: Sandra Lerma   :  1950  74 y.o.   MRN:  [id number]  ED Room #:  ER18/18  Family/Caregiver Present yes       Situation  Code Status: Full Code     Allergies: Fluoxetine  Weight: Patient Vitals for the past 96 hrs (Last 3 readings):   Weight   24 66.6 kg (146 lb 13.2 oz)     Arrived from: home  Chief Complaint:   Chief Complaint   Patient presents with    Shortness of Breath     Hospital Problem/Diagnosis:  Principal Problem:    COPD (chronic obstructive pulmonary disease) (HCC)  Resolved Problems:    * No resolved hospital problems. *    Imaging:   XR CHEST PORTABLE   Final Result   Cardiomegaly and mild interstitial pulmonary edema.          Electronically signed by ERICK GRAY      CTA CHEST W WO CONTRAST PE Eval   Final Result      1. No evidence of pulmonary embolism.   2. Multiple enlarged mediastinal lymph nodes, including a right paratracheal   alan mass, highly suspicious for malignancy, either primary lung malignancy,   metastatic disease, or lymphoma.   3. Small right pleural effusion.   4. Scattered tree-in-bud nodules throughout the right lung as above, with   associated areas of bronchial wall thickening and mucous plugging, favored to   represent multifocal bronchopneumonia, and possibly with a component of   aspiration.         Electronically signed by Salvador Arzola        Abnormal labs:   Abnormal Labs Reviewed   CBC WITH AUTO DIFFERENTIAL - Abnormal; Notable for the following components:       Result Value    RDW 15.9 (*)     All other components within normal limits   COMPREHENSIVE METABOLIC PANEL - Abnormal; Notable for the following components:    Potassium 3.1 (*)     Chloride 96 (*)     CO2 39 (*)     Anion Gap 2 (*)     Glucose 116 (*)     BUN/Creatinine Ratio 9 (*)     Calcium 10.9 (*)     Alk Phosphatase 126 (*)     Total Protein 8.5 (*)     Albumin 3.4 (*)     Globulin 5.1 (*)     Albumin/Globulin Ratio 0.7 (*)     All other  components within normal limits   BRAIN NATRIURETIC PEPTIDE - Abnormal; Notable for the following components:    NT Pro- (*)     All other components within normal limits     Abnormal Assessment Findings: none      SAFETY    Mobility: no current mobility problem   ED Fall Risk: No data recorded   Restraints no   Sitter no     Background  History:   Past Medical History:   Diagnosis Date    Anxiety     Arthritis     knees    High cholesterol     Hypertension     Psychiatric disorder     anxiety       Assessment    Vitals/MEWS: MEWS Score: 1  Level of Consciousness: Alert (0)   Vitals:    08/1950 08/11/24 1958 08/11/24 2015 08/11/24 2030   BP: (!) 167/70  132/65    Pulse: 84      Resp: 18      Temp: 97.9 °F (36.6 °C)      TempSrc: Oral      SpO2: (!) 86% 94%  96%   Weight: 66.6 kg (146 lb 13.2 oz)      Height: 1.575 m (5' 2\")        DI:   Predictive Model Details          29 (Normal)  Factor Value    Calculated 8/12/2024 04:07 41% Age 74 years old    Deterioration Index Model 39% Supplemental oxygen Nasal cannula     8% Respiratory rate 18     4% Potassium abnormal (3.1 mmol/L)     4% Systolic 132     3% Sodium 137 mmol/L     1% Pulse 84     0% Temperature 97.9 °F (36.6 °C)     0% Pulse oximetry 96 %     0% Hematocrit 38.9 %     0% WBC count 5.9 K/uL       FiO2 (%): nasal cannula for respiratory distress  O2 Flow Rate: O2 Device: Nasal cannula O2 Flow Rate (L/min): 2 L/min  Cardiac Rhythm:    Pain Scale: Pain Assessment  Pain Assessment: 0-10  Patient's Stated Pain Goal: 0 - No pain  Last documented pain score (0-10 scale)    Last documented pain medication administered:      Mental Status: oriented  Orientation Level:    NIH Score:    C-SSRS: Risk of Suicide: No Risk  Bedside swallow:    [place] Coma Scale (GCS): [place] Coma Scale  Eye Opening: Spontaneous  Best Verbal Response: Oriented  Best Motor Response: Obeys commands  [place] Coma Scale Score: 15  Active LDA's:   Peripheral IV 08/11/24 Left

## 2024-08-13 LAB
ANION GAP SERPL CALC-SCNC: 5 MMOL/L (ref 5–15)
BUN SERPL-MCNC: 12 MG/DL (ref 6–20)
BUN/CREAT SERPL: 14 (ref 12–20)
CALCIUM SERPL-MCNC: 10.7 MG/DL (ref 8.5–10.1)
CHLORIDE SERPL-SCNC: 101 MMOL/L (ref 97–108)
CO2 SERPL-SCNC: 34 MMOL/L (ref 21–32)
CREAT SERPL-MCNC: 0.84 MG/DL (ref 0.55–1.02)
GLUCOSE SERPL-MCNC: 105 MG/DL (ref 65–100)
POTASSIUM SERPL-SCNC: 4 MMOL/L (ref 3.5–5.1)
SODIUM SERPL-SCNC: 140 MMOL/L (ref 136–145)

## 2024-08-13 PROCEDURE — 6370000000 HC RX 637 (ALT 250 FOR IP): Performed by: NURSE PRACTITIONER

## 2024-08-13 PROCEDURE — 6370000000 HC RX 637 (ALT 250 FOR IP): Performed by: STUDENT IN AN ORGANIZED HEALTH CARE EDUCATION/TRAINING PROGRAM

## 2024-08-13 PROCEDURE — 2580000003 HC RX 258: Performed by: STUDENT IN AN ORGANIZED HEALTH CARE EDUCATION/TRAINING PROGRAM

## 2024-08-13 PROCEDURE — 6360000002 HC RX W HCPCS: Performed by: STUDENT IN AN ORGANIZED HEALTH CARE EDUCATION/TRAINING PROGRAM

## 2024-08-13 PROCEDURE — 97530 THERAPEUTIC ACTIVITIES: CPT

## 2024-08-13 PROCEDURE — 2700000000 HC OXYGEN THERAPY PER DAY

## 2024-08-13 PROCEDURE — 80048 BASIC METABOLIC PNL TOTAL CA: CPT

## 2024-08-13 PROCEDURE — 1100000000 HC RM PRIVATE

## 2024-08-13 PROCEDURE — 36415 COLL VENOUS BLD VENIPUNCTURE: CPT

## 2024-08-13 PROCEDURE — 94760 N-INVAS EAR/PLS OXIMETRY 1: CPT

## 2024-08-13 PROCEDURE — 97535 SELF CARE MNGMENT TRAINING: CPT

## 2024-08-13 PROCEDURE — 2500000003 HC RX 250 WO HCPCS: Performed by: STUDENT IN AN ORGANIZED HEALTH CARE EDUCATION/TRAINING PROGRAM

## 2024-08-13 PROCEDURE — 6370000000 HC RX 637 (ALT 250 FOR IP)

## 2024-08-13 PROCEDURE — 97116 GAIT TRAINING THERAPY: CPT

## 2024-08-13 RX ORDER — ALPRAZOLAM 0.5 MG/1
0.5 TABLET ORAL 2 TIMES DAILY PRN
Status: DISCONTINUED | OUTPATIENT
Start: 2024-08-13 | End: 2024-08-16 | Stop reason: HOSPADM

## 2024-08-13 RX ADMIN — PIPERACILLIN AND TAZOBACTAM 3375 MG: 3; .375 INJECTION, POWDER, LYOPHILIZED, FOR SOLUTION INTRAVENOUS at 07:38

## 2024-08-13 RX ADMIN — FAMOTIDINE 40 MG: 20 TABLET, FILM COATED ORAL at 21:37

## 2024-08-13 RX ADMIN — PAROXETINE HYDROCHLORIDE 10 MG: 20 TABLET, FILM COATED ORAL at 10:18

## 2024-08-13 RX ADMIN — ALPRAZOLAM 0.5 MG: 0.5 TABLET ORAL at 16:26

## 2024-08-13 RX ADMIN — MIRTAZAPINE 15 MG: 15 TABLET, FILM COATED ORAL at 21:37

## 2024-08-13 RX ADMIN — DOXYCYCLINE 100 MG: 100 INJECTION, POWDER, LYOPHILIZED, FOR SOLUTION INTRAVENOUS at 21:46

## 2024-08-13 RX ADMIN — SODIUM CHLORIDE: 9 INJECTION, SOLUTION INTRAVENOUS at 15:51

## 2024-08-13 RX ADMIN — GUAIFENESIN 600 MG: 600 TABLET, EXTENDED RELEASE ORAL at 10:17

## 2024-08-13 RX ADMIN — DOCUSATE SODIUM 100 MG: 100 CAPSULE, LIQUID FILLED ORAL at 21:36

## 2024-08-13 RX ADMIN — SODIUM CHLORIDE, PRESERVATIVE FREE 10 ML: 5 INJECTION INTRAVENOUS at 10:23

## 2024-08-13 RX ADMIN — POLYETHYLENE GLYCOL 3350 17 G: 17 POWDER, FOR SOLUTION ORAL at 12:04

## 2024-08-13 RX ADMIN — SODIUM CHLORIDE 25 ML: 900 INJECTION INTRAVENOUS at 12:48

## 2024-08-13 RX ADMIN — PIPERACILLIN AND TAZOBACTAM 3375 MG: 3; .375 INJECTION, POWDER, LYOPHILIZED, FOR SOLUTION INTRAVENOUS at 22:51

## 2024-08-13 RX ADMIN — SODIUM CHLORIDE 25 ML: 900 INJECTION INTRAVENOUS at 15:49

## 2024-08-13 RX ADMIN — PREDNISONE 60 MG: 20 TABLET ORAL at 10:17

## 2024-08-13 RX ADMIN — DOXYCYCLINE 100 MG: 100 INJECTION, POWDER, LYOPHILIZED, FOR SOLUTION INTRAVENOUS at 12:52

## 2024-08-13 RX ADMIN — SODIUM CHLORIDE, PRESERVATIVE FREE 10 ML: 5 INJECTION INTRAVENOUS at 21:42

## 2024-08-13 RX ADMIN — MAGNESIUM HYDROXIDE 30 ML: 400 SUSPENSION ORAL at 12:45

## 2024-08-13 RX ADMIN — AMLODIPINE BESYLATE 10 MG: 5 TABLET ORAL at 10:51

## 2024-08-13 RX ADMIN — FAMOTIDINE 40 MG: 20 TABLET, FILM COATED ORAL at 10:18

## 2024-08-13 RX ADMIN — TRAZODONE HYDROCHLORIDE 100 MG: 100 TABLET ORAL at 21:37

## 2024-08-13 RX ADMIN — PIPERACILLIN AND TAZOBACTAM 3375 MG: 3; .375 INJECTION, POWDER, LYOPHILIZED, FOR SOLUTION INTRAVENOUS at 15:50

## 2024-08-13 RX ADMIN — GUAIFENESIN 600 MG: 600 TABLET, EXTENDED RELEASE ORAL at 21:36

## 2024-08-13 RX ADMIN — ATORVASTATIN CALCIUM 80 MG: 40 TABLET, FILM COATED ORAL at 10:19

## 2024-08-13 RX ADMIN — ARFORMOTEROL TARTRATE: 15 SOLUTION RESPIRATORY (INHALATION) at 21:31

## 2024-08-13 NOTE — CONSULTS
Pulmonary, Critical Care, and Sleep Medicine~Consult Note    Name: Sandra Lerma MRN: [id number]   : 1950 Hospital: [facility]   Date: 2024 12:41 PM Admission: 2024     Impression Plan   Abnormal CT scan.  Significant mediastinal lymphadenopathy; subcarinal at 4.1 cm, right paratracheal at 4.9 cm, pretracheal node at cm, small right pleural effusion, scattered tree-in-bud nodularities in the right lung.  Former smoker, suspect COPD  Anxiety/depression Agree with bronchodilators  On Zosyn/Doxy  P.o. steroids  O2 titration above 90%  Discussed with attending.  Consideration of EBUS on this admission is plausible but defer to attending for final decision  Discussed with family at bedside as well.  Discussed with hospitalist NP     Daily Progression:    Consult Note requested by hospitalist service.    Patient presented for admission on 2024 secondary to progressive shortness of breath over the last few days.  Nina steven is at bedside.  There was initial report that breathing has been worsening over the last few months truthfully and there has been unintentional weight loss.  Patient does smoke and has been doing so for around 60 years.  She is not followed by pulmonary medicine outpatient basis.  No prior imaging for comparison to the one that has been done this admission.  CT scan on 2024 showed mediastinal lymphadenopathy with the largest being a right paratracheal at 4.9 cm.  There was scattered tree-in-bud nodularities in the right lung as well, possibly related to an aspiration event.  No PE was noted on the CT scan.  Patient does report having a colonoscopy but unknown what time.    I have reviewed the labs and previous day’s notes.    Pertinent items are noted in HPI.  Past Medical History:   Diagnosis Date    Anxiety     Arthritis     knees    High cholesterol     Hypertension     Psychiatric disorder     anxiety      Past Surgical History:   Procedure Laterality  chloride 0.9 % 50 mL IVPB (mini-bag)  3,375 mg IntraVENous q8h    doxycycline (VIBRAMYCIN) 100 mg in sodium chloride 0.9 % 100 mL IVPB (mini-bag)  100 mg IntraVENous Q12H    arformoterol 15 mcg-budesonide 0.5 mg neb solution   Nebulization BID RT    amLODIPine (NORVASC) tablet 10 mg  10 mg Oral Daily    And    atorvastatin (LIPITOR) tablet 80 mg  80 mg Oral Daily    predniSONE (DELTASONE) tablet 60 mg  60 mg Oral Daily    sodium chloride flush 0.9 % injection 5-40 mL  5-40 mL IntraVENous 2 times per day    sodium chloride flush 0.9 % injection 5-40 mL  5-40 mL IntraVENous PRN    0.9 % sodium chloride infusion   IntraVENous PRN    potassium chloride (KLOR-CON) extended release tablet 40 mEq  40 mEq Oral PRN    Or    potassium bicarb-citric acid (EFFER-K) effervescent tablet 40 mEq  40 mEq Oral PRN    Or    potassium chloride 10 mEq/100 mL IVPB (Peripheral Line)  10 mEq IntraVENous PRN    magnesium sulfate 2000 mg in 50 mL IVPB premix  2,000 mg IntraVENous PRN    ondansetron (ZOFRAN-ODT) disintegrating tablet 4 mg  4 mg Oral Q8H PRN    Or    ondansetron (ZOFRAN) injection 4 mg  4 mg IntraVENous Q6H PRN    polyethylene glycol (GLYCOLAX) packet 17 g  17 g Oral Daily PRN    acetaminophen (TYLENOL) tablet 650 mg  650 mg Oral Q6H PRN    Or    acetaminophen (TYLENOL) suppository 650 mg  650 mg Rectal Q6H PRN    0.9 % sodium chloride infusion   IntraVENous Continuous    guaiFENesin (MUCINEX) extended release tablet 600 mg  600 mg Oral BID       Labs:  ABG Invalid input(s): \"ABG\"     CBC Recent Labs     08/11/24 2010 08/12/24  0828   WBC 5.9 4.4   HGB 12.0 11.1*   HCT 38.9 37.0    301   MCV 87.2 87.3   MCH 26.9 26.2        Metabolic  Panel Recent Labs     08/11/24 2010 08/12/24  0828 08/13/24  0758    140 140   K 3.1* 3.7 4.0   CL 96* 100 101   CO2 39* 38* 34*   BUN 7 7 12   ALT 17  --   --         Pertinent Labs                Lucius Shaikh PA-C  8/13/2024

## 2024-08-13 NOTE — PLAN OF CARE
Problem: Discharge Planning  Goal: Discharge to home or other facility with appropriate resources  8/13/2024 1352 by Te Licea, RN  Outcome: Progressing     Problem: Pain  Goal: Verbalizes/displays adequate comfort level or baseline comfort level  8/13/2024 1352 by Te Licea, RN  Outcome: Progressing     Problem: Safety - Adult  Goal: Free from fall injury  8/13/2024 1352 by Te Licea, RN  Outcome: Progressing     Problem: Respiratory - Adult  Goal: Achieves optimal ventilation and oxygenation  8/13/2024 1352 by Te Licea, RN  Outcome: Progressing

## 2024-08-13 NOTE — PLAN OF CARE
Comment: Pt very impulsive this date and frantic when attempting ADL activities.  pt appears overwhelmed by the amount of items in her room (2 recliners, 2 chairs, BSC, table, IV pole, O2, telemetry box, and 2 family members).  she does redirect with increased verbal cues.    Functional Mobility Training:  Bed Mobility:  Bed Mobility Training  Bed Mobility Training: Yes  Overall Level of Assistance: Stand-by assistance (required IV, O2 and telebox management)  Rolling: Stand-by assistance  Supine to Sit: Stand-by assistance  Sit to Supine: Stand-by assistance  Scooting: Stand-by assistance  Transfers:  Transfer Training  Transfer Training: Yes  Overall Level of Assistance: Contact-guard assistance  Sit to Stand: Contact-guard assistance (contact guard for IV, O2, tele box management)  Stand to Sit: Contact-guard assistance  Bed to Chair: Stand-by assistance  Toilet Transfer: Stand-by assistance (bed side commode)  Balance:  Balance  Sitting: Impaired  Sitting - Static: Good (unsupported)  Sitting - Dynamic: Good (unsupported)  Standing: Impaired;Without support  Standing - Static: Good  Standing - Dynamic: Fair;Constant support   Ambulation/Gait Training:     Gait  Gait Training: Yes  Overall Level of Assistance: Contact-guard assistance (hand hold of PT or steadying self with wall, furniture)  Distance (ft): 40 Feet  Assistive Device: Other (comment) (handhold and IV/O2/Telebox management)  Base of Support: Center of gravity altered  Speed/Gem: Fluctuations  Gait Abnormalities: Path deviations;Trunk sway increased                   Pain Rating:  None reported or observed  Pain Intervention(s):   N/A    Activity Tolerance:   Fair  unable to check spO2 due to patient's agitation.    After treatment:   Patient left in no apparent distress sitting up in chair, Call bell within reach, Caregiver / family present, and nurse notified. No chair alarm placed as family stated that they will be there in the room at all  times. Instructed them to let nursing know if they are leaving so that bed or chair alarm can be placed.      COMMUNICATION/EDUCATION:   The patient's plan of care was discussed with: occupational therapist and registered nurse           Leigh Ann Garcia, PT  Minutes: 28

## 2024-08-13 NOTE — PLAN OF CARE
Problem: Occupational Therapy - Adult  Goal: By Discharge: Performs self-care activities at highest level of function for planned discharge setting.  See evaluation for individualized goals.  Description: FUNCTIONAL STATUS PRIOR TO ADMISSION: Pt unable to provide PLOF/home set-up. Pt's daughter present and reported pt lives with son and was IND with ADLs and required assistance with IADLs.     HOME SUPPORT: Patient lived with son in apartment.    Occupational Therapy Goals:  Initiated 8/12/2024  1.  Patient will perform upper body dressing seated with Minimal Assist within 7 day(s).  2.  Patient will perform lower body bathing seated with Moderate Assist and AE PRN within 7 day(s).  3.  Patient will perform lower body dressing with Moderate Assist and AE PRN within 7 day(s).  4.  Patient will perform all aspects of toileting with Moderate Assist within 7 day(s).  5.  Patient will participate in upper extremity therapeutic exercise/activities with Stand by Assist for 5 minutes within 7 day(s).    6.  Patient will utilize energy conservation techniques during functional activities with verbal cues within 7 day(s).    Outcome: Progressing     OCCUPATIONAL THERAPY TREATMENT  Patient: Sandra Lerma (74 y.o. female)  Date: 8/13/2024  Primary Diagnosis: Shortness of breath [R06.02]  COPD (chronic obstructive pulmonary disease) (HCC) [J44.9]  Hypoxia [R09.02]  COPD exacerbation (HCC) [J44.1]  Suspected malignant neoplasm [R68.89]       Precautions: Fall Risk                Chart, occupational therapy assessment, plan of care, and goals were reviewed.    ASSESSMENT  Patient continues to benefit from skilled OT services and is progressing towards goals. Pt more alert and initially willing to engage with therapy activities.  Pt agreeable to progress OOB for toileting and completed transfer with min A x 2 persons due to line management (IV pole, portable O2, telemetry box) and pt very frantic with reaching and grabbing at

## 2024-08-13 NOTE — PLAN OF CARE
Problem: Physical Therapy - Adult  Goal: By Discharge: Performs mobility at highest level of function for planned discharge setting.  See evaluation for individualized goals.  Description: FUNCTIONAL STATUS PRIOR TO ADMISSION: Pt unable to provide PLOF so history was obtained from daughters at bedside.  Pt lives with her cousin and was independent with mobility and ADLs.  Pt does not drive, therefore daughters assist with driving.  Daughters report recent decline over the last ~month.     HOME SUPPORT PRIOR TO ADMISSION: Lives with cousin but did not require assistance.  Daughters assist with driving.      Physical Therapy Goals  Initiated 8/12/2024  1.  Patient will move from supine to sit and sit to supine, scoot up and down, and roll side to side in bed with minimal assistance within 7 day(s).    2.  Patient will perform sit to stand with minimal assistance within 7 day(s).  3.  Patient will transfer from bed to chair and chair to bed with minimal assistance using the least restrictive device within 7 day(s).  4.  Patient will ambulate with minimal assistance for 15 feet with the least restrictive device within 7 day(s).     8/12/2024 1143 by Leslie Li PT  Outcome: Not Progressing     Problem: Occupational Therapy - Adult  Goal: By Discharge: Performs self-care activities at highest level of function for planned discharge setting.  See evaluation for individualized goals.  Description: FUNCTIONAL STATUS PRIOR TO ADMISSION: Pt unable to provide PLOF/home set-up. Pt's daughter present and reported pt lives with son and was IND with ADLs and required assistance with IADLs.     HOME SUPPORT: Patient lived with son in apartment.    Occupational Therapy Goals:  Initiated 8/12/2024  1.  Patient will perform upper body dressing seated with Minimal Assist within 7 day(s).  2.  Patient will perform lower body bathing seated with Moderate Assist and AE PRN within 7 day(s).  3.  Patient will perform lower body

## 2024-08-13 NOTE — PROGRESS NOTES
Hospitalist Progress Note  JIAN Vergara - NP  Answering service: [phone]        Date of Service:  2024  NAME:  Sandra Lerma  :  1950  MRN:  [id number]      Admission Summary:     Ms. Lerma is a 74 y.o. female with pmh of RA, MDD w/ MAHOGANY, htn, dyslipidemia who presented to the ED with complaints of non productive cough and sob. Family at bedside noticed increasing dyspnea with very minimal exertion that has been progressive and over the last 2 days PTA she has been noted to have a \"puffing like breathing effort\" with minimal activity.  Pt reportedly has also lost ~ 15 - 20 pounds unintentionally.  She is a daily smoker.  No recent travel or sick contacts.  In the ED,   oxygen spo2 was 86% on RA. CT chest showed mediastinal LAD, right pretracheal alan mass, scattered tree-in-bud nodules throughout the right lung as above, with associated areas of bronchial wall thickening and mucous plugging.     Interval history / Subjective:        Patient was seen and examined this morning, she was sitting on the side of her bed in no acute distress though did look fairly anxious.  She reports she is nervous about this admission and indicates that she got very anxious last night when she tried to get up out of bed by herself.  Granddaughter was at bedside this morning, updated them on the plan of care-have reached out to pulmonology for consult regarding lung mass as thoracic surgery is not available at this time.    Assessment & Plan:     Multifocal Bronchopneumonia / COPD Exacerbation  - cont with zosyn and doxy  - procal <0.05  - continue prednisone 60mg daily  - geoffbs q4  - brovana + pulmicort     Mediastinal LAD and Lung Mass  - concerning for malignancy  - thoracic surgery is not available at this time, pulmonology has been consulted  - Patient will need an EBUS - this admit?     Hypokalemia  - resolved

## 2024-08-13 NOTE — PROGRESS NOTES
Unit secretary called for thoracic surgery consult to [phone], on August 12 and August 13, left message.  Recorded message stated there's no one on call.  will call again after 9:00 AM.

## 2024-08-13 NOTE — PROGRESS NOTES
Per [facility] pharmacist, Zosyn rate will  be increased to 50 mL/hr.  Zosyn started by night nurse at 07:38 however, mostly (not all the way) was clamped off. This med is not Y-site compatible with Doxycycline.  Doxycycline will be re timed by pharmacy upon the completion of Zosyn.

## 2024-08-13 NOTE — PLAN OF CARE
Problem: Discharge Planning  Goal: Discharge to home or other facility with appropriate resources  8/13/2024 0145 by Lida Leos (Ha) RN  Outcome: Progressing  8/13/2024 0145 by Lida Leos), RN  Outcome: Progressing  8/12/2024 2204 by Narcisa Barros LPN  Outcome: Progressing  Flowsheets (Taken 8/12/2024 0900)  Discharge to home or other facility with appropriate resources: Identify barriers to discharge with patient and caregiver     Problem: Pain  Goal: Verbalizes/displays adequate comfort level or baseline comfort level  8/13/2024 0145 by Lida Leos), RN  Outcome: Progressing  8/13/2024 0145 by Lida Leos), RN  Outcome: Progressing  8/12/2024 2204 by Narcisa Barros LPN  Outcome: Progressing     Problem: Safety - Adult  Goal: Free from fall injury  8/13/2024 0145 by Lida Leos), RN  Outcome: Progressing  8/13/2024 0145 by Lida Leos), RN  Outcome: Progressing  8/12/2024 2204 by Narcisa Barros LPN  Outcome: Progressing     Problem: Occupational Therapy - Adult  Goal: By Discharge: Performs self-care activities at highest level of function for planned discharge setting.  See evaluation for individualized goals.  Description: FUNCTIONAL STATUS PRIOR TO ADMISSION: Pt unable to provide PLOF/home set-up. Pt's daughter present and reported pt lives with son and was IND with ADLs and required assistance with IADLs.     HOME SUPPORT: Patient lived with son in apartment.    Occupational Therapy Goals:  Initiated 8/12/2024  1.  Patient will perform upper body dressing seated with Minimal Assist within 7 day(s).  2.  Patient will perform lower body bathing seated with Moderate Assist and AE PRN within 7 day(s).  3.  Patient will perform lower body dressing with Moderate Assist and AE PRN within 7 day(s).  4.  Patient will perform all aspects of toileting with Moderate Assist within 7 day(s).  5.  Patient will participate in upper extremity therapeutic exercise/activities with Stand by Assist for 5 minutes  within 7 day(s).    6.  Patient will utilize energy conservation techniques during functional activities with verbal cues within 7 day(s).    8/12/2024 1224 by Blanche Ruff OT  Outcome: Not Progressing     Problem: Respiratory - Adult  Goal: Achieves optimal ventilation and oxygenation  Outcome: Progressing     Problem: Occupational Therapy - Adult  Goal: By Discharge: Performs self-care activities at highest level of function for planned discharge setting.  See evaluation for individualized goals.  Description: FUNCTIONAL STATUS PRIOR TO ADMISSION: Pt unable to provide PLOF/home set-up. Pt's daughter present and reported pt lives with son and was IND with ADLs and required assistance with IADLs.     HOME SUPPORT: Patient lived with son in apartment.    Occupational Therapy Goals:  Initiated 8/12/2024  1.  Patient will perform upper body dressing seated with Minimal Assist within 7 day(s).  2.  Patient will perform lower body bathing seated with Moderate Assist and AE PRN within 7 day(s).  3.  Patient will perform lower body dressing with Moderate Assist and AE PRN within 7 day(s).  4.  Patient will perform all aspects of toileting with Moderate Assist within 7 day(s).  5.  Patient will participate in upper extremity therapeutic exercise/activities with Stand by Assist for 5 minutes within 7 day(s).    6.  Patient will utilize energy conservation techniques during functional activities with verbal cues within 7 day(s).    8/12/2024 1224 by Blanche Ruff OT  Outcome: Not Progressing

## 2024-08-14 LAB
IGA SERPL-MCNC: 249 MG/DL (ref 70–400)
IGG SERPL-MCNC: 1700 MG/DL (ref 700–1600)
IGM SERPL-MCNC: 69 MG/DL (ref 40–230)

## 2024-08-14 PROCEDURE — 97116 GAIT TRAINING THERAPY: CPT

## 2024-08-14 PROCEDURE — 36415 COLL VENOUS BLD VENIPUNCTURE: CPT

## 2024-08-14 PROCEDURE — 82784 ASSAY IGA/IGD/IGG/IGM EACH: CPT

## 2024-08-14 PROCEDURE — 94760 N-INVAS EAR/PLS OXIMETRY 1: CPT

## 2024-08-14 PROCEDURE — 6360000002 HC RX W HCPCS: Performed by: STUDENT IN AN ORGANIZED HEALTH CARE EDUCATION/TRAINING PROGRAM

## 2024-08-14 PROCEDURE — 6370000000 HC RX 637 (ALT 250 FOR IP): Performed by: NURSE PRACTITIONER

## 2024-08-14 PROCEDURE — 6370000000 HC RX 637 (ALT 250 FOR IP)

## 2024-08-14 PROCEDURE — 2580000003 HC RX 258: Performed by: STUDENT IN AN ORGANIZED HEALTH CARE EDUCATION/TRAINING PROGRAM

## 2024-08-14 PROCEDURE — 97535 SELF CARE MNGMENT TRAINING: CPT

## 2024-08-14 PROCEDURE — 97530 THERAPEUTIC ACTIVITIES: CPT

## 2024-08-14 PROCEDURE — 6370000000 HC RX 637 (ALT 250 FOR IP): Performed by: STUDENT IN AN ORGANIZED HEALTH CARE EDUCATION/TRAINING PROGRAM

## 2024-08-14 PROCEDURE — 1100000000 HC RM PRIVATE

## 2024-08-14 PROCEDURE — 2700000000 HC OXYGEN THERAPY PER DAY

## 2024-08-14 PROCEDURE — 2500000003 HC RX 250 WO HCPCS: Performed by: STUDENT IN AN ORGANIZED HEALTH CARE EDUCATION/TRAINING PROGRAM

## 2024-08-14 PROCEDURE — 94640 AIRWAY INHALATION TREATMENT: CPT

## 2024-08-14 RX ORDER — IPRATROPIUM BROMIDE AND ALBUTEROL SULFATE 2.5; .5 MG/3ML; MG/3ML
1 SOLUTION RESPIRATORY (INHALATION)
Status: DISCONTINUED | OUTPATIENT
Start: 2024-08-14 | End: 2024-08-16

## 2024-08-14 RX ORDER — LACTULOSE 10 G/15ML
20 SOLUTION ORAL ONCE
Status: COMPLETED | OUTPATIENT
Start: 2024-08-14 | End: 2024-08-14

## 2024-08-14 RX ADMIN — DOXYCYCLINE 100 MG: 100 INJECTION, POWDER, LYOPHILIZED, FOR SOLUTION INTRAVENOUS at 22:03

## 2024-08-14 RX ADMIN — PIPERACILLIN AND TAZOBACTAM 3375 MG: 3; .375 INJECTION, POWDER, LYOPHILIZED, FOR SOLUTION INTRAVENOUS at 07:10

## 2024-08-14 RX ADMIN — GUAIFENESIN 600 MG: 600 TABLET, EXTENDED RELEASE ORAL at 09:01

## 2024-08-14 RX ADMIN — PAROXETINE HYDROCHLORIDE 10 MG: 20 TABLET, FILM COATED ORAL at 09:02

## 2024-08-14 RX ADMIN — ALPRAZOLAM 0.5 MG: 0.5 TABLET ORAL at 04:05

## 2024-08-14 RX ADMIN — BISACODYL 5 MG: 5 TABLET, COATED ORAL at 11:19

## 2024-08-14 RX ADMIN — FAMOTIDINE 40 MG: 20 TABLET, FILM COATED ORAL at 21:12

## 2024-08-14 RX ADMIN — ARFORMOTEROL TARTRATE: 15 SOLUTION RESPIRATORY (INHALATION) at 21:15

## 2024-08-14 RX ADMIN — PIPERACILLIN AND TAZOBACTAM 3375 MG: 3; .375 INJECTION, POWDER, LYOPHILIZED, FOR SOLUTION INTRAVENOUS at 23:40

## 2024-08-14 RX ADMIN — GUAIFENESIN 600 MG: 600 TABLET, EXTENDED RELEASE ORAL at 21:12

## 2024-08-14 RX ADMIN — MIRTAZAPINE 15 MG: 15 TABLET, FILM COATED ORAL at 21:12

## 2024-08-14 RX ADMIN — TRAZODONE HYDROCHLORIDE 100 MG: 100 TABLET ORAL at 21:12

## 2024-08-14 RX ADMIN — ATORVASTATIN CALCIUM 80 MG: 40 TABLET, FILM COATED ORAL at 09:01

## 2024-08-14 RX ADMIN — AMLODIPINE BESYLATE 10 MG: 5 TABLET ORAL at 09:01

## 2024-08-14 RX ADMIN — PIPERACILLIN AND TAZOBACTAM 3375 MG: 3; .375 INJECTION, POWDER, LYOPHILIZED, FOR SOLUTION INTRAVENOUS at 15:08

## 2024-08-14 RX ADMIN — FAMOTIDINE 40 MG: 20 TABLET, FILM COATED ORAL at 09:01

## 2024-08-14 RX ADMIN — ALPRAZOLAM 0.5 MG: 0.5 TABLET ORAL at 15:36

## 2024-08-14 RX ADMIN — PREDNISONE 60 MG: 20 TABLET ORAL at 09:01

## 2024-08-14 RX ADMIN — ARFORMOTEROL TARTRATE: 15 SOLUTION RESPIRATORY (INHALATION) at 08:08

## 2024-08-14 RX ADMIN — LACTULOSE 20 G: 20 SOLUTION ORAL at 15:37

## 2024-08-14 RX ADMIN — DOXYCYCLINE 100 MG: 100 INJECTION, POWDER, LYOPHILIZED, FOR SOLUTION INTRAVENOUS at 11:24

## 2024-08-14 RX ADMIN — IPRATROPIUM BROMIDE AND ALBUTEROL SULFATE 1 DOSE: .5; 3 SOLUTION RESPIRATORY (INHALATION) at 21:15

## 2024-08-14 NOTE — PLAN OF CARE
3L O2 to maintain saturations 90% or greater.  At this time, pt is well below her baseline function and may require discharge to rehab setting.  She is making great improvements overall and if she has support and help at home, she may be able to progress to home with increased help.          PLAN :  Patient continues to benefit from skilled intervention to address the above impairments.  Continue treatment per established plan of care to address goals.    Recommend with staff: OOB to chair TID for meals.  Recommend progression to and from bathroom with use of gait belt for toileting.       Recommend next OT session: continue with established plan of care    Recommendation for discharge: (in order for the patient to meet his/her long term goals): No skilled occupational therapy    Other factors to consider for discharge: impaired cognition, high risk for falls, and no additional factors    IF patient discharges home will need the following DME: TBD       SUBJECTIVE:   Patient stated “I am feeling alright.”    OBJECTIVE DATA SUMMARY:   Cognitive/Behavioral Status:  Orientation  Overall Orientation Status: Impaired  Orientation Level: Oriented to place;Oriented to person;Disoriented to time;Disoriented to situation  Cognition  Overall Cognitive Status: Exceptions  Arousal/Alertness: Appears intact  Following Commands: Follows one step commands with repetition  Attention Span: Attends with cues to redirect  Memory: Decreased short term memory;Decreased long term memory  Safety Judgement: Impaired  Problem Solving: Assistance required to correct errors made;Assistance required to generate solutions;Assistance required to identify errors made  Insights: Decreased awareness of deficits  Initiation: Appears intact  Sequencing: Appears intact  Cognition Comment: Pt's mentation greatly improved this date.  she is oriented to person and very pleasant.  Pt consistently following commands.    Functional Mobility and Transfers  for ADLs:  Bed Mobility:  Bed Mobility Training  Bed Mobility Training: No (pt recieved OOB in the chair)  Overall Level of Assistance: Stand-by assistance (required O2 management)  Rolling: Stand-by assistance  Supine to Sit: Stand-by assistance  Sit to Supine: Stand-by assistance  Scooting: Stand-by assistance     Transfers:   Transfer Training  Transfer Training: Yes  Overall Level of Assistance: Contact-guard assistance  Interventions: Verbal cues  Sit to Stand: Contact-guard assistance  Stand to Sit: Contact-guard assistance  Bed to Chair: Contact-guard assistance  Toilet Transfer: Contact-guard assistance           Balance:     Balance  Sitting: Intact  Sitting - Static: Good (unsupported)  Sitting - Dynamic: Good (unsupported)  Standing: Intact;Without support  Standing - Static: Good  Standing - Dynamic: Fair      ADL Intervention:       Toileting: Contact guard assistance        Skin Care: Bath wipes;Soap and water    Pain Ratin/10   Pain Intervention(s):     Activity Tolerance:   Good  Please refer to the flowsheet for vital signs taken during this treatment.    After treatment:   Patient left in no apparent distress sitting up in chair and Call bell within reach    COMMUNICATION/EDUCATION:   The patient's plan of care was discussed with: physical therapist and registered nurse    Patient Education  Education Given To: Patient;Family  Education Provided: Plan of Care;ADL Adaptive Strategies;Role of Therapy;Energy Conservation  Education Provided Comments: Oxygen education  Education Method: Verbal  Barriers to Learning: Cognition  Education Outcome: Continued education needed    Thank you for this referral.  Charo Dias OT  Minutes: 25

## 2024-08-14 NOTE — PLAN OF CARE
Problem: Physical Therapy - Adult  Goal: By Discharge: Performs mobility at highest level of function for planned discharge setting.  See evaluation for individualized goals.  Description: FUNCTIONAL STATUS PRIOR TO ADMISSION: Pt unable to provide PLOF so history was obtained from daughters at bedside.  Pt lives with her cousin and was independent with mobility and ADLs.  Pt does not drive, therefore daughters assist with driving.  Daughters report recent decline over the last ~month.     HOME SUPPORT PRIOR TO ADMISSION: Lives with cousin but did not require assistance.  Daughters assist with driving.      Physical Therapy Goals  Initiated 8/12/2024  1.  Patient will move from supine to sit and sit to supine, scoot up and down, and roll side to side in bed with minimal assistance within 7 day(s).    2.  Patient will perform sit to stand with minimal assistance within 7 day(s).  3.  Patient will transfer from bed to chair and chair to bed with minimal assistance using the least restrictive device within 7 day(s).  4.  Patient will ambulate with minimal assistance for 15 feet with the least restrictive device within 7 day(s).     Outcome: Progressing   PHYSICAL THERAPY TREATMENT    Patient: Sandra Lerma (74 y.o. female)  Date: 8/14/2024  Diagnosis: Shortness of breath [R06.02]  COPD (chronic obstructive pulmonary disease) ([facility]) [J44.9]  Hypoxia [R09.02]  COPD exacerbation (HCC) [J44.1]  Suspected malignant neoplasm [R68.89] COPD (chronic obstructive pulmonary disease) ([facility])      Precautions: Fall Risk                      ASSESSMENT:  Patient continues to benefit from skilled PT services and is progressing towards goals. Patient up in chair, alert, cooperative, not agitated today. Patient ambulated in hallway 150 ft, no AD, stand by assistance with occasional trunk sway and deviation from path. Much steadier than yesterday.    Documentation for home O2:     ROOM AIR    AT REST   O2 SATS  90 HR  76   ROOM AIR WITH

## 2024-08-14 NOTE — PROGRESS NOTES
PHYSICAL THERAPY    Please note: documentation for Home Oxygen in PT note for 8/14/2024.    Leigh Ann Garcia/PT

## 2024-08-14 NOTE — PROGRESS NOTES
sodium (COLACE) capsule 100 mg  100 mg Oral QHS    famotidine (PEPCID) tablet 40 mg  40 mg Oral BID    PARoxetine (PAXIL) tablet 10 mg  10 mg Oral Daily    mirtazapine (REMERON) tablet 15 mg  15 mg Oral Nightly    traZODone (DESYREL) tablet 100 mg  100 mg Oral Nightly    piperacillin-tazobactam (ZOSYN) 3,375 mg in sodium chloride 0.9 % 50 mL IVPB (mini-bag)  3,375 mg IntraVENous q8h    doxycycline (VIBRAMYCIN) 100 mg in sodium chloride 0.9 % 100 mL IVPB (mini-bag)  100 mg IntraVENous Q12H    arformoterol 15 mcg-budesonide 0.5 mg neb solution   Nebulization BID RT    amLODIPine (NORVASC) tablet 10 mg  10 mg Oral Daily    And    atorvastatin (LIPITOR) tablet 80 mg  80 mg Oral Daily    predniSONE (DELTASONE) tablet 60 mg  60 mg Oral Daily    sodium chloride flush 0.9 % injection 5-40 mL  5-40 mL IntraVENous 2 times per day    sodium chloride flush 0.9 % injection 5-40 mL  5-40 mL IntraVENous PRN    0.9 % sodium chloride infusion   IntraVENous PRN    potassium chloride (KLOR-CON) extended release tablet 40 mEq  40 mEq Oral PRN    Or    potassium bicarb-citric acid (EFFER-K) effervescent tablet 40 mEq  40 mEq Oral PRN    Or    potassium chloride 10 mEq/100 mL IVPB (Peripheral Line)  10 mEq IntraVENous PRN    magnesium sulfate 2000 mg in 50 mL IVPB premix  2,000 mg IntraVENous PRN    ondansetron (ZOFRAN-ODT) disintegrating tablet 4 mg  4 mg Oral Q8H PRN    Or    ondansetron (ZOFRAN) injection 4 mg  4 mg IntraVENous Q6H PRN    polyethylene glycol (GLYCOLAX) packet 17 g  17 g Oral Daily PRN    acetaminophen (TYLENOL) tablet 650 mg  650 mg Oral Q6H PRN    Or    acetaminophen (TYLENOL) suppository 650 mg  650 mg Rectal Q6H PRN    guaiFENesin (MUCINEX) extended release tablet 600 mg  600 mg Oral BID       Labs:  ABG Invalid input(s): \"ABG\"     CBC Recent Labs     08/11/24 2010 08/12/24  0828   WBC 5.9 4.4   HGB 12.0 11.1*   HCT 38.9 37.0    301   MCV 87.2 87.3   MCH 26.9 26.2        Metabolic  Panel Recent Labs

## 2024-08-14 NOTE — PROGRESS NOTES
Hospitalist Progress Note  JIAN Vergara - NP  Answering service: [phone]        Date of Service:  2024  NAME:  Sandra Lerma  :  1950  MRN:  [id number]      Admission Summary:     Ms. Lerma is a 74 y.o. female with pmh of RA, MDD w/ MAHOGANY, htn, dyslipidemia who presented to the ED with complaints of non productive cough and sob. Family at bedside noticed increasing dyspnea with very minimal exertion that has been progressive and over the last 2 days PTA she has been noted to have a \"puffing like breathing effort\" with minimal activity.  Pt reportedly has also lost ~ 15 - 20 pounds unintentionally.  She is a daily smoker.  No recent travel or sick contacts.  In the ED,   oxygen spo2 was 86% on RA. CT chest showed mediastinal LAD, right pretracheal alan mass, scattered tree-in-bud nodules throughout the right lung as above, with associated areas of bronchial wall thickening and mucous plugging.     Interval history / Subjective:        Patient was seen and examined this afternoon, she was sitting up in a chair in no acute distress, cooperative and interactive with assessment.  She reports she is feeling much better today, breathing is not as labored and is not coughing as much.  She does still indicate that she is a bit nervous about her diagnosis but since her Xanax has been reordered, is controlled.    Assessment & Plan:     Multifocal Bronchopneumonia / COPD Exacerbation  - cont with zosyn and doxy  - procal <0.05  - continue prednisone 60mg daily for now  - duonebs q4 [place]  - brovana + pulmicort  - Per nursing, patient was hypoxic with physical therapy, continues to need 2 L nasal cannula.  Will need home oxygen assessment closer to discharge.     Mediastinal LAD and Lung Mass  - concerning for malignancy  - thoracic surgery is not available at this time, pulmonology has been consulted  - Patient

## 2024-08-15 PROCEDURE — 1100000000 HC RM PRIVATE

## 2024-08-15 PROCEDURE — 6370000000 HC RX 637 (ALT 250 FOR IP)

## 2024-08-15 PROCEDURE — 94760 N-INVAS EAR/PLS OXIMETRY 1: CPT

## 2024-08-15 PROCEDURE — 6360000002 HC RX W HCPCS: Performed by: STUDENT IN AN ORGANIZED HEALTH CARE EDUCATION/TRAINING PROGRAM

## 2024-08-15 PROCEDURE — 97116 GAIT TRAINING THERAPY: CPT

## 2024-08-15 PROCEDURE — 6370000000 HC RX 637 (ALT 250 FOR IP): Performed by: FAMILY MEDICINE

## 2024-08-15 PROCEDURE — 2580000003 HC RX 258: Performed by: STUDENT IN AN ORGANIZED HEALTH CARE EDUCATION/TRAINING PROGRAM

## 2024-08-15 PROCEDURE — 94640 AIRWAY INHALATION TREATMENT: CPT

## 2024-08-15 PROCEDURE — 97530 THERAPEUTIC ACTIVITIES: CPT

## 2024-08-15 PROCEDURE — 97535 SELF CARE MNGMENT TRAINING: CPT

## 2024-08-15 PROCEDURE — 6370000000 HC RX 637 (ALT 250 FOR IP): Performed by: NURSE PRACTITIONER

## 2024-08-15 PROCEDURE — 6370000000 HC RX 637 (ALT 250 FOR IP): Performed by: STUDENT IN AN ORGANIZED HEALTH CARE EDUCATION/TRAINING PROGRAM

## 2024-08-15 PROCEDURE — 2700000000 HC OXYGEN THERAPY PER DAY

## 2024-08-15 RX ORDER — DOXYCYCLINE HYCLATE 100 MG
100 TABLET ORAL EVERY 12 HOURS SCHEDULED
Status: DISCONTINUED | OUTPATIENT
Start: 2024-08-15 | End: 2024-08-16 | Stop reason: HOSPADM

## 2024-08-15 RX ADMIN — PIPERACILLIN AND TAZOBACTAM 3375 MG: 3; .375 INJECTION, POWDER, LYOPHILIZED, FOR SOLUTION INTRAVENOUS at 23:21

## 2024-08-15 RX ADMIN — ARFORMOTEROL TARTRATE: 15 SOLUTION RESPIRATORY (INHALATION) at 19:16

## 2024-08-15 RX ADMIN — IPRATROPIUM BROMIDE AND ALBUTEROL SULFATE 1 DOSE: .5; 3 SOLUTION RESPIRATORY (INHALATION) at 19:17

## 2024-08-15 RX ADMIN — FAMOTIDINE 40 MG: 20 TABLET, FILM COATED ORAL at 10:08

## 2024-08-15 RX ADMIN — ARFORMOTEROL TARTRATE: 15 SOLUTION RESPIRATORY (INHALATION) at 07:45

## 2024-08-15 RX ADMIN — GUAIFENESIN 600 MG: 600 TABLET, EXTENDED RELEASE ORAL at 21:20

## 2024-08-15 RX ADMIN — ATORVASTATIN CALCIUM 80 MG: 40 TABLET, FILM COATED ORAL at 10:12

## 2024-08-15 RX ADMIN — ALPRAZOLAM 0.5 MG: 0.5 TABLET ORAL at 15:45

## 2024-08-15 RX ADMIN — IPRATROPIUM BROMIDE AND ALBUTEROL SULFATE 1 DOSE: .5; 3 SOLUTION RESPIRATORY (INHALATION) at 15:30

## 2024-08-15 RX ADMIN — ALPRAZOLAM 0.5 MG: 0.5 TABLET ORAL at 05:43

## 2024-08-15 RX ADMIN — SODIUM CHLORIDE, PRESERVATIVE FREE 10 ML: 5 INJECTION INTRAVENOUS at 09:58

## 2024-08-15 RX ADMIN — FAMOTIDINE 40 MG: 20 TABLET, FILM COATED ORAL at 21:19

## 2024-08-15 RX ADMIN — MIRTAZAPINE 15 MG: 15 TABLET, FILM COATED ORAL at 21:19

## 2024-08-15 RX ADMIN — PAROXETINE HYDROCHLORIDE 10 MG: 20 TABLET, FILM COATED ORAL at 10:08

## 2024-08-15 RX ADMIN — GUAIFENESIN 600 MG: 600 TABLET, EXTENDED RELEASE ORAL at 10:09

## 2024-08-15 RX ADMIN — DOCUSATE SODIUM 100 MG: 100 CAPSULE, LIQUID FILLED ORAL at 21:19

## 2024-08-15 RX ADMIN — PIPERACILLIN AND TAZOBACTAM 3375 MG: 3; .375 INJECTION, POWDER, LYOPHILIZED, FOR SOLUTION INTRAVENOUS at 12:45

## 2024-08-15 RX ADMIN — IPRATROPIUM BROMIDE AND ALBUTEROL SULFATE 1 DOSE: .5; 3 SOLUTION RESPIRATORY (INHALATION) at 11:33

## 2024-08-15 RX ADMIN — AMLODIPINE BESYLATE 10 MG: 5 TABLET ORAL at 09:59

## 2024-08-15 RX ADMIN — SODIUM CHLORIDE: 900 INJECTION INTRAVENOUS at 23:20

## 2024-08-15 RX ADMIN — IPRATROPIUM BROMIDE AND ALBUTEROL SULFATE 1 DOSE: .5; 3 SOLUTION RESPIRATORY (INHALATION) at 07:46

## 2024-08-15 RX ADMIN — PREDNISONE 60 MG: 20 TABLET ORAL at 10:08

## 2024-08-15 RX ADMIN — DOXYCYCLINE HYCLATE 100 MG: 100 TABLET, COATED ORAL at 15:45

## 2024-08-15 RX ADMIN — PIPERACILLIN AND TAZOBACTAM 3375 MG: 3; .375 INJECTION, POWDER, LYOPHILIZED, FOR SOLUTION INTRAVENOUS at 07:23

## 2024-08-15 NOTE — PROGRESS NOTES
Pulmonary, Critical Care, and Sleep Medicine~Consult Note    Name: Sandra Lerma MRN: [id number]   : 1950 Hospital: [facility]   Date: 8/15/2024 11:14 AM Admission: 2024     Impression Plan   Abnormal CT scan.  Significant mediastinal lymphadenopathy; subcarinal at 4.1 cm, right paratracheal at 4.9 cm, pretracheal node at cm, small right pleural effusion, scattered tree-in-bud nodularities in the right lung.  Former smoker, suspect COPD  Anxiety/depression Bronchodilators  On Zosyn/Doxy  P.o. steroids for 10 day taper   O2 titration above 90%  EBUS as out patient once overall respiratory status improves.  Will schedule procedure for next Thursday.  Send IgG and IgM.  Can eat     8/15  Doing ok  IgG 1700  IgA 249  IgM 69        -family at bedside.  -CT chest reviewed with the patient - large R paratracheal and sub carinal LN.  -Cough with yellow sputum. Wheezing better. Feeling better with abx.  -Denies fever, chills.  -O2 requirement 2-3 lpm    Daily Progression:    Consult Note requested by hospitalist service.    Patient presented for admission on 2024 secondary to progressive shortness of breath over the last few days.  Nina steven is at bedside.  There was initial report that breathing has been worsening over the last few months truthfully and there has been unintentional weight loss.  Patient does smoke and has been doing so for around 60 years.  She is not followed by pulmonary medicine outpatient basis.  No prior imaging for comparison to the one that has been done this admission.  CT scan on 2024 showed mediastinal lymphadenopathy with the largest being a right paratracheal at 4.9 cm.  There was scattered tree-in-bud nodularities in the right lung as well, possibly related to an aspiration event.  No PE was noted on the CT scan.  Patient does report having a colonoscopy but unknown what time.    I have reviewed the labs and previous day’s notes.    Pertinent

## 2024-08-15 NOTE — PLAN OF CARE
Problem: Pain  Goal: Verbalizes/displays adequate comfort level or baseline comfort level  Outcome: Progressing     Problem: Safety - Adult  Goal: Free from fall injury  Outcome: Progressing     Problem: Respiratory - Adult  Goal: Achieves optimal ventilation and oxygenation  Outcome: Progressing

## 2024-08-15 NOTE — PLAN OF CARE
Problem: Occupational Therapy - Adult  Goal: By Discharge: Performs self-care activities at highest level of function for planned discharge setting.  See evaluation for individualized goals.  Description: FUNCTIONAL STATUS PRIOR TO ADMISSION: Pt unable to provide PLOF/home set-up. Pt's daughter present and reported pt lives with son and was IND with ADLs and required assistance with IADLs.     HOME SUPPORT: Patient lived with son in apartment.    Occupational Therapy Goals:  Initiated 8/12/2024  1.  Patient will perform upper body dressing seated with Minimal Assist within 7 day(s).  2.  Patient will perform lower body bathing seated with Moderate Assist and AE PRN within 7 day(s).  3.  Patient will perform lower body dressing with Moderate Assist and AE PRN within 7 day(s).  4.  Patient will perform all aspects of toileting with Moderate Assist within 7 day(s).  5.  Patient will participate in upper extremity therapeutic exercise/activities with Stand by Assist for 5 minutes within 7 day(s).    6.  Patient will utilize energy conservation techniques during functional activities with verbal cues within 7 day(s).    Outcome: Completed    OCCUPATIONAL THERAPY TREATMENT/DISCHARGE  Patient: Sandra Lerma (74 y.o. female)  Date: 8/15/2024  Primary Diagnosis: Shortness of breath [R06.02]  COPD (chronic obstructive pulmonary disease) (HCC) [J44.9]  Hypoxia [R09.02]  COPD exacerbation (HCC) [J44.1]  Suspected malignant neoplasm [R68.89]       Precautions: Fall Risk                  Chart, occupational therapy assessment, plan of care, and goals were reviewed.    ASSESSMENT  Patient continues with skilled OT services and has progressed towards goals.  Pt received OOB in chair with sons present. Pt alert and oriented x3, following 100% commands. Pt received on 2L NC with sats at rest 100%; O2 on RA at rest >95%. Pt O2 on RA with activity did drop 88%, however, quickly recovered to >90% with standing rest break and PLB. Pt

## 2024-08-15 NOTE — PLAN OF CARE
Problem: Physical Therapy - Adult  Goal: By Discharge: Performs mobility at highest level of function for planned discharge setting.  See evaluation for individualized goals.  Description: FUNCTIONAL STATUS PRIOR TO ADMISSION: Pt unable to provide PLOF so history was obtained from daughters at bedside.  Pt lives with her cousin and was independent with mobility and ADLs.  Pt does not drive, therefore daughters assist with driving.  Daughters report recent decline over the last ~month.     HOME SUPPORT PRIOR TO ADMISSION: Lives with cousin but did not require assistance.  Daughters assist with driving.      Physical Therapy Goals  Initiated 2024  1.  Patient will move from supine to sit and sit to supine, scoot up and down, and roll side to side in bed with minimal assistance within 7 day(s).    2.  Patient will perform sit to stand with minimal assistance within 7 day(s).  3.  Patient will transfer from bed to chair and chair to bed with minimal assistance using the least restrictive device within 7 day(s).  4.  Patient will ambulate with minimal assistance for 15 feet with the least restrictive device within 7 day(s).   Outcome: Progressing  PHYSICAL THERAPY TREATMENT    Patient: Sandra Lerma (74 y.o. female)  Date: 8/15/2024  Diagnosis: Shortness of breath [R06.02]  COPD (chronic obstructive pulmonary disease) ([facility]) [J44.9]  Hypoxia [R09.02]  COPD exacerbation (HCC) [J44.1]  Suspected malignant neoplasm [R68.89] COPD (chronic obstructive pulmonary disease) ([facility])      Precautions: Fall Risk                      ASSESSMENT:  Patient continues to benefit from skilled PT services and is progressing towards goals. Presents with generalized weakness, impaired balance, impaired gait,  activity tolerance, and mild confusion.  Received in chair and agreeable to work with therapy.  Overall SBA for transfers and ambulating 300 ft.  Pt presented with increased trunk sway and path deviation but no overt LOB.   2L O2    After treatment:   Patient left in no apparent distress sitting up in chair, Call bell within reach, and Caregiver / family present      COMMUNICATION/EDUCATION:   The patient's plan of care was discussed with: occupational therapist and registered nurse      Leslie Li, PT, DPT  Minutes: 32

## 2024-08-15 NOTE — PROGRESS NOTES
Hospitalist Progress Note  Naun Mcnair MD  Answering service: [phone] OR [phone] from in house phone        Date of Service:  8/15/2024  NAME:  Sandra Lerma  :  1950  MRN:  [id number]      Admission Summary:     Patient admitted with pneumonia.    Interval history / Subjective:     Patient is overall feeling better.  Less shortness of breath.     Assessment & Plan:     Multifocal bronchopneumonia/aspiration  -CT chest shows tree-in-bud nodules throughout the right lung also with areas of bronchial wall thickening and mucous plugging favored to represent multifocal bronchopneumonia, possibly a component of aspiration  -Respiratory viral panel negative, blood cultures not available  -On empiric Zosyn and doxycycline    Acute exacerbation of COPD  -Antibiotics as above  -Continue bronchodilators, wean prednisone, breathing treatment as needed  -Pulmonology following    Lung mass  -CT of the chest shows multiple enlarged mediastinal lymph nodes including right paratracheal alan mass, highly suspicious for malignancy  -Pulmonology following, plan for EBUS as outpatient next week    Hypertension  -Continue amlodipine    Dyslipidemia  -Continue statin    Mood disorder  -On Paxil, Remeron and trazodone     Code status: Full  Prophylaxis: SCDs  Care Plan discussed with: Patient and patient's son present at bedside.  Anticipated Disposition: 24 to 48 hours     Active Hospital Problems    Diagnosis Date Noted    COPD (chronic obstructive pulmonary disease) (HCC) [J44.9] 2024         Review of Systems:   Pertinent items are noted in HPI.         Vital Signs:    Last 24hrs VS reviewed since prior progress note. Most recent are:  Vitals:    08/15/24 0812   BP: 111/70   Pulse: 81   Resp: 12   Temp: 97.5 °F (36.4 °C)   SpO2: 98%       No intake or output data in the 24 hours ending 08/15/24 0938     Physical Examination:      I had a face to face encounter with this patient and independently examined them on 8/15/2024 as outlined below:          General : alert x 3, awake, no acute distress,   HEENT: PEERL, EOMI, moist mucus membrane, TM clear  Neck: supple, no JVD, no meningeal signs  Chest: Clear to auscultation bilaterally   CVS: S1 S2 heard, Capillary refill less than 2 seconds  Abd: soft/ non tender, non distended, BS physiological,   Ext: no clubbing, no cyanosis, no edema, brisk 2+ DP pulses  Neuro/Psych: pleasant mood and affect, CN 2-12 grossly intact, sensory grossly within normal limit, Strength 5/5 in all extremities, DTR 1+ x 4  Skin: warm            Data Review:    Review and/or order of clinical lab test    I have independently reviewed and interpreted patient's lab and all other diagnostic data    Notes reviewed from all clinical/nonclinical/nursing services involved in patient's clinical care. Care coordination discussions were held with appropriate clinical/nonclinical/ nursing providers based on care coordination needs.     Labs:   No results for input(s): \"WBC\", \"HGB\", \"HCT\", \"PLT\" in the last 72 hours.  Recent Labs     08/13/24  0758      K 4.0      CO2 34*   BUN 12     No results for input(s): \"ALT\", \"TP\", \"GLOB\", \"GGT\" in the last 72 hours.    Invalid input(s): \"SGOT\", \"GPT\", \"AP\", \"TBIL\", \"TBILI\", \"ALB\", \"AML\", \"AMYP\", \"LPSE\", \"HLPSE\"  No results for input(s): \"INR\", \"APTT\" in the last 72 hours.    Invalid input(s): \"PTP\"   No results for input(s): \"TIBC\" in the last 72 hours.    Invalid input(s): \"FE\", \"PSAT\", \"FERR\"   No results found for: \"RBCF\"   No results for input(s): \"PH\", \"PCO2\", \"PO2\" in the last 72 hours.  No results for input(s): \"CPK\" in the last 72 hours.    Invalid input(s): \"CPKMB\", \"CKNDX\", \"TROIQ\"  Lab Results   Component Value Date/Time    CHOL 190 10/03/2023 01:33 PM    CHOL 183 10/20/2022 09:36 AM    HDL 63 10/03/2023 01:33 PM     10/03/2023 01:33 PM     No results found

## 2024-08-16 VITALS
HEART RATE: 81 BPM | OXYGEN SATURATION: 97 % | WEIGHT: 146.83 LBS | BODY MASS INDEX: 27.02 KG/M2 | SYSTOLIC BLOOD PRESSURE: 104 MMHG | RESPIRATION RATE: 16 BRPM | DIASTOLIC BLOOD PRESSURE: 65 MMHG | TEMPERATURE: 98.2 F | HEIGHT: 62 IN

## 2024-08-16 PROCEDURE — 6370000000 HC RX 637 (ALT 250 FOR IP): Performed by: NURSE PRACTITIONER

## 2024-08-16 PROCEDURE — 97116 GAIT TRAINING THERAPY: CPT | Performed by: PHYSICAL THERAPIST

## 2024-08-16 PROCEDURE — 94640 AIRWAY INHALATION TREATMENT: CPT

## 2024-08-16 PROCEDURE — 6370000000 HC RX 637 (ALT 250 FOR IP): Performed by: STUDENT IN AN ORGANIZED HEALTH CARE EDUCATION/TRAINING PROGRAM

## 2024-08-16 PROCEDURE — 6370000000 HC RX 637 (ALT 250 FOR IP): Performed by: FAMILY MEDICINE

## 2024-08-16 PROCEDURE — 6370000000 HC RX 637 (ALT 250 FOR IP)

## 2024-08-16 PROCEDURE — 2580000003 HC RX 258: Performed by: STUDENT IN AN ORGANIZED HEALTH CARE EDUCATION/TRAINING PROGRAM

## 2024-08-16 PROCEDURE — 6360000002 HC RX W HCPCS: Performed by: STUDENT IN AN ORGANIZED HEALTH CARE EDUCATION/TRAINING PROGRAM

## 2024-08-16 PROCEDURE — 2700000000 HC OXYGEN THERAPY PER DAY

## 2024-08-16 RX ORDER — PSEUDOEPHEDRINE HCL 30 MG
100 TABLET ORAL
Qty: 3 CAPSULE | Refills: 0 | Status: SHIPPED | OUTPATIENT
Start: 2024-08-16 | End: 2024-08-19

## 2024-08-16 RX ORDER — IPRATROPIUM BROMIDE AND ALBUTEROL SULFATE 2.5; .5 MG/3ML; MG/3ML
1 SOLUTION RESPIRATORY (INHALATION)
Status: DISCONTINUED | OUTPATIENT
Start: 2024-08-16 | End: 2024-08-16 | Stop reason: HOSPADM

## 2024-08-16 RX ORDER — PREDNISONE 20 MG/1
TABLET ORAL
Qty: 11 TABLET | Refills: 0 | Status: SHIPPED | OUTPATIENT
Start: 2024-08-17 | End: 2024-08-26

## 2024-08-16 RX ORDER — ATORVASTATIN CALCIUM 80 MG/1
80 TABLET, FILM COATED ORAL DAILY
Qty: 30 TABLET | Refills: 3 | Status: CANCELLED | OUTPATIENT
Start: 2024-08-17

## 2024-08-16 RX ORDER — PREDNISONE 20 MG/1
40 TABLET ORAL DAILY
Status: DISCONTINUED | OUTPATIENT
Start: 2024-08-17 | End: 2024-08-16 | Stop reason: HOSPADM

## 2024-08-16 RX ORDER — DOXYCYCLINE HYCLATE 100 MG
100 TABLET ORAL EVERY 12 HOURS SCHEDULED
Qty: 1 TABLET | Refills: 0 | Status: SHIPPED | OUTPATIENT
Start: 2024-08-16 | End: 2024-08-17

## 2024-08-16 RX ORDER — AMLODIPINE BESYLATE 10 MG/1
10 TABLET ORAL DAILY
Qty: 30 TABLET | Refills: 3 | Status: CANCELLED | OUTPATIENT
Start: 2024-08-17

## 2024-08-16 RX ADMIN — IPRATROPIUM BROMIDE AND ALBUTEROL SULFATE 1 DOSE: .5; 3 SOLUTION RESPIRATORY (INHALATION) at 07:46

## 2024-08-16 RX ADMIN — AMLODIPINE BESYLATE 10 MG: 5 TABLET ORAL at 09:01

## 2024-08-16 RX ADMIN — PIPERACILLIN AND TAZOBACTAM 3375 MG: 3; .375 INJECTION, POWDER, LYOPHILIZED, FOR SOLUTION INTRAVENOUS at 15:00

## 2024-08-16 RX ADMIN — ATORVASTATIN CALCIUM 80 MG: 40 TABLET, FILM COATED ORAL at 09:02

## 2024-08-16 RX ADMIN — PREDNISONE 60 MG: 20 TABLET ORAL at 09:01

## 2024-08-16 RX ADMIN — GUAIFENESIN 600 MG: 600 TABLET, EXTENDED RELEASE ORAL at 09:01

## 2024-08-16 RX ADMIN — DOXYCYCLINE HYCLATE 100 MG: 100 TABLET, COATED ORAL at 09:01

## 2024-08-16 RX ADMIN — PAROXETINE HYDROCHLORIDE 10 MG: 20 TABLET, FILM COATED ORAL at 09:01

## 2024-08-16 RX ADMIN — SODIUM CHLORIDE: 900 INJECTION INTRAVENOUS at 07:36

## 2024-08-16 RX ADMIN — FAMOTIDINE 40 MG: 20 TABLET, FILM COATED ORAL at 09:01

## 2024-08-16 RX ADMIN — ALPRAZOLAM 0.5 MG: 0.5 TABLET ORAL at 16:12

## 2024-08-16 RX ADMIN — ALPRAZOLAM 0.5 MG: 0.5 TABLET ORAL at 05:49

## 2024-08-16 RX ADMIN — SODIUM CHLORIDE, PRESERVATIVE FREE 10 ML: 5 INJECTION INTRAVENOUS at 09:02

## 2024-08-16 RX ADMIN — ARFORMOTEROL TARTRATE: 15 SOLUTION RESPIRATORY (INHALATION) at 07:46

## 2024-08-16 RX ADMIN — PIPERACILLIN AND TAZOBACTAM 3375 MG: 3; .375 INJECTION, POWDER, LYOPHILIZED, FOR SOLUTION INTRAVENOUS at 07:38

## 2024-08-16 ASSESSMENT — PAIN SCALES - GENERAL: PAINLEVEL_OUTOF10: 0

## 2024-08-16 NOTE — CARE COORDINATION
Transition of Care Plan:    RUR: 13%   Prior Level of Functioning: Independent   Disposition: Home with Home Health and Family Assistance   Home Health; Accepted   [facility]; Declined   [facility]   [facility]  [facility]  [facility]   The pt reported that she did not have a preference in a HH agency. This cm provided the pt with brief education regarding the service.   Follow up appointments: PCP, Pulm   DME needed: [employer] [facility]; Delivered \"No Preference\"   Transportation at discharge: Son   IM/IMM Medicare/ letter given: Received   Emergency Contact: ShandaMary (Child) [phone]  Discharge Caregiver contacted prior to discharge? Yes   Care Conference needed? N/A        08/16/24 7486   Discharge Planning   Type of Residence Apartment   Living Arrangements Family Members   Potential Assistance Needed Durable Medical Equipment;Home Care   Potential DME Needed Oxygen Therapy (Comment)   DME Ordered? Oxygen therapy (comment)   Potential Assistance Purchasing Medications No   Type of Home Care Services PT   History of falls? 0   Services At/After Discharge   Transition of Care Consult (CM Consult) Discharge Planning;Home Health   Internal Home Health No   Mode of Transport at Discharge Other (see comment)   Confirm Follow Up Transport Family   Condition of Participation: Discharge Planning   The Plan for Transition of Care is related to the following treatment goals: Home Health   The Patient and/or Patient Representative was provided with a Choice of Provider? Patient   The Patient and/Or Patient Representative agree with the Discharge Plan? Yes   Freedom of Choice list was provided with basic dialogue that supports the patient's individualized plan of care/goals, treatment preferences, and shares the quality data associated with the providers?  Yes

## 2024-08-16 NOTE — PLAN OF CARE
Problem: Pain  Goal: Verbalizes/displays adequate comfort level or baseline comfort level  Outcome: Progressing     Problem: Safety - Adult  Goal: Free from fall injury  Outcome: Progressing     Problem: Respiratory - Adult  Goal: Achieves optimal ventilation and oxygenation  Outcome: Progressing  Flowsheets (Taken 8/15/2024 0900 by Narcisa Barros LPN)  Achieves optimal ventilation and oxygenation: Assess for changes in respiratory status

## 2024-08-16 NOTE — PROGRESS NOTES
Reconciliation, Ar   traZODone (DESYREL) 100 MG tablet Take 1 tablet by mouth nightly 10/10/22   Automatic Reconciliation, Ar     Allergies   Allergen Reactions    Fluoxetine Anxiety     Irritable, restless      Social History     Tobacco Use    Smoking status: Some Days     Current packs/day: 0.00     Types: Cigarettes     Last attempt to quit: 2019     Years since quittin.1    Smokeless tobacco: Never   Substance Use Topics    Alcohol use: No      Family History   Problem Relation Age of Onset    Hypertension Paternal Grandmother     Hypertension Maternal Grandfather     Hypertension Maternal Grandmother     Cancer Father         lung    Cancer Mother         throat    Hypertension Paternal Grandfather      OBJECTIVE:     Vital Signs:     /80   Pulse 87   Temp 98.2 °F (36.8 °C) (Oral)   Resp 16   Ht 1.575 m (5' 2\")   Wt 66.6 kg (146 lb 13.2 oz)   SpO2 98%   BMI 26.85 kg/m²    Temp (24hrs), Av.1 °F (36.7 °C), Min:98 °F (36.7 °C), Max:98.2 °F (36.8 °C)     Intake/Output:     Last shift: No intake/output data recorded.    Last 3 shifts: No intake/output data recorded.      No intake or output data in the 24 hours ending 24 1029    Physical Exam:                                        Exam Findings Other   General: No resp distress noted, appears stated age    HEENT:  No ulcers, JVD not elevated, no cervical LAD    Chest: No pectus deformity, normal chest rise b/l    HEART:  No visible thrills     Lungs:  Normal expansion     ABD: Soft/NT, non rigid mildly distended    EXT: No cyanosis/clubbing/edema, normal peripheral pulses    Skin: No rashes or ulcers, no mottling    Neuro: A/O x 3        Medications:  Current Facility-Administered Medications   Medication Dose Route Frequency    ipratropium 0.5 mg-albuterol 2.5 mg (DUONEB) nebulizer solution 1 Dose  1 Dose Inhalation BID RT    doxycycline hyclate (VIBRA-TABS) tablet 100 mg  100 mg Oral 2 times per day    ALPRAZolam (XANAX) tablet 0.5  for input(s): \"NA\", \"K\", \"CL\", \"CO2\", \"GLU\", \"BUN\", \"MG\", \"PHOS\", \"ALT\", \"INR\" in the last 72 hours.    Invalid input(s): \"CREA\", \"CA\", \"ALB\", \"TBIL\", \"SGOT\"       Pertinent Labs                Lucius Shaikh PA-C  8/16/2024

## 2024-08-16 NOTE — DISCHARGE SUMMARY
Discharge Summary       PATIENT ID: Sandra Lerma  MRN: [id number]   YOB: 1950    DATE OF ADMISSION: 8/11/2024  8:06 PM    DATE OF DISCHARGE: 08/16/2024  PRIMARY CARE PROVIDER: Valeria Min APRN - NP     ATTENDING PHYSICIAN: CAMILLE Cardenas MD  DISCHARGING PROVIDER: CATE Garcia    To contact this individual call [phone] and ask the  to page.  If unavailable ask to be transferred the Adult Hospitalist Department.    CONSULTATIONS: IP CONSULT TO THORACIC SURGERY  IP CONSULT TO PULMONOLOGY  IP CONSULT HOME HEALTH    PROCEDURES/SURGERIES: * No surgery found *    ADMITTING DIAGNOSES & HOSPITAL COURSE:   Ms. Lerma is a 74 y.o. female with pmh of RA, MDD w/ MAHOGANY, htn, dyslipidemia who presented to the ED with complaints of non productive cough and sob. Family at bedside noticed increasing dyspnea with very minimal exertion that has been progressive and over the last 2 days PTA she has been noted to have a \"puffing like breathing effort\" with minimal activity.  Pt reportedly has also lost ~ 15 - 20 pounds unintentionally.  She is a daily smoker.  No recent travel or sick contacts.  In the ED,   oxygen spo2 was 86% on RA. CT chest showed mediastinal LAD, right pretracheal alan mass, scattered tree-in-bud nodules throughout the right lung as above, with associated areas of bronchial wall thickening and mucous plugging.      DISCHARGE DIAGNOSES / PLAN:      Multifocal Bronchopneumonia / COPD Exacerbation  - completed zosyn and doxy  - procal <0.05  - continue prednisone taper  - continue supplemental oxygen 2L nasal cannula   - follow-up with pulmonary outpatient     Mediastinal LAD and Lung Mass  - concerning for malignancy  - thoracic surgery is not available at this time, pulmonology has been consulted  - pulmonology would like to do EBUS next week as outpatient once pt improved from current exacerbation/pneumonia  - schedule follow-up appt after discharge     Hypokalemia  - resolved post  acute distress,   HEENT: PEERL, EOMI, moist mucus membrane  Neck: supple, no JVD, no meningeal signs  Chest: Clear to auscultation bilaterally, diminished, nasal cannula 2L  CVS: S1 S2 heard, Capillary refill less than 2 seconds  Abd: soft/ Non tender, non distended, BS physiological,   Ext: VALDES, +edema  Neuro/Psych: pleasant mood and affect, CN 2-12 grossly intact, sensory grossly within normal limit, Strength 5/5 in all extremities,   Skin: warm           Greater than 31 minutes were spent with the patient on counseling and coordination of care    Signed:   CATE Garcia  8/16/2024  1:15 PM

## 2024-08-16 NOTE — PLAN OF CARE
Problem: Pain  Goal: Verbalizes/displays adequate comfort level or baseline comfort level  8/16/2024 1050 by Marita Crooks RN  Outcome: Progressing  8/15/2024 2247 by Lucie Robledo RN  Outcome: Progressing     Problem: Safety - Adult  Goal: Free from fall injury  8/16/2024 1050 by Marita Crooks RN  Outcome: Progressing  Flowsheets (Taken 8/16/2024 0755)  Free From Fall Injury:   Instruct family/caregiver on patient safety   Based on caregiver fall risk screen, instruct family/caregiver to ask for assistance with transferring infant if caregiver noted to have fall risk factors  8/15/2024 2247 by Lucie Robledo RN  Outcome: Progressing     Problem: Respiratory - Adult  Goal: Achieves optimal ventilation and oxygenation  8/16/2024 1050 by Marita Crooks RN  Outcome: Progressing  8/15/2024 2247 by Lucie Robledo RN  Outcome: Progressing  Flowsheets (Taken 8/15/2024 0900 by Narcisa Barros LPN)  Achieves optimal ventilation and oxygenation: Assess for changes in respiratory status

## 2024-08-16 NOTE — CARE COORDINATION
Pulse oximetry assessment   95% at rest on room air (if 88% or less, skip next steps)  88% while ambulating on room air  100% at rest on 2LPM  95% while ambulating on 2LPM

## 2024-08-16 NOTE — PLAN OF CARE
Problem: Physical Therapy - Adult  Goal: By Discharge: Performs mobility at highest level of function for planned discharge setting.  See evaluation for individualized goals.  Description: FUNCTIONAL STATUS PRIOR TO ADMISSION: Pt unable to provide PLOF so history was obtained from daughters at bedside.  Pt lives with her cousin and was independent with mobility and ADLs.  Pt does not drive, therefore daughters assist with driving.  Daughters report recent decline over the last ~month.     HOME SUPPORT PRIOR TO ADMISSION: Lives with cousin but did not require assistance.  Daughters assist with driving.      Physical Therapy Goals  Initiated 8/12/2024  1.  Patient will move from supine to sit and sit to supine, scoot up and down, and roll side to side in bed with minimal assistance within 7 day(s).    2.  Patient will perform sit to stand with minimal assistance within 7 day(s).  3.  Patient will transfer from bed to chair and chair to bed with minimal assistance using the least restrictive device within 7 day(s).  4.  Patient will ambulate with minimal assistance for 15 feet with the least restrictive device within 7 day(s).     Outcome: Progressing  PHYSICAL THERAPY TREATMENT    Patient: Sandra Lerma (74 y.o. female)  Date: 8/16/2024  Diagnosis: Shortness of breath [R06.02]  COPD (chronic obstructive pulmonary disease) ([facility]) [J44.9]  Hypoxia [R09.02]  COPD exacerbation (HCC) [J44.1]  Suspected malignant neoplasm [R68.89] COPD (chronic obstructive pulmonary disease) ([facility])      Precautions: Fall Risk                      ASSESSMENT:  Patient continues to benefit from skilled PT services and is progressing towards goals. The patient is up in the chair on arrival to the room.  She is on 2L O2 and ambulated 300 feet on 2L.  She has a narrow based gait and is quite agile and quick.  She has no balance loss.  She anticipates disch for today.           PLAN:  Patient continues to benefit from skilled intervention to

## 2024-08-16 NOTE — DISCHARGE INSTRUCTIONS
Discharge Instructions       PATIENT ID: Sandra Lerma  MRN: [id number]   YOB: 1950    DATE OF ADMISSION: 8/11/2024   DATE OF DISCHARGE: 8/16/2024    PRIMARY CARE PROVIDER: Valeria Min     ATTENDING PHYSICIAN: Anneliese Cardenas MD   DISCHARGING PROVIDER: CATE Garcia    To contact this individual call [phone] and ask the  to page.   If unavailable ask to be transferred the Adult Hospitalist Department.    DISCHARGE DIAGNOSES   Multifocal Bronchopneumonia / COPD Exacerbation  - completed zosyn and doxy  - procal <0.05  - continue prednisone taper  - continue supplemental oxygen 2L nasal cannula   - follow-up with pulmonary outpatient     Mediastinal LAD and Lung Mass  - concerning for malignancy  - thoracic surgery is not available at this time, pulmonology has been consulted  - pulmonology would like to do EBUS next week as outpatient once pt improved from current exacerbation/pneumonia  - schedule follow-up appt after discharge     Hypokalemia  - resolved post replacement     MDD w/ MAHOGANY  -continue Xanax     Constipation resolved  - started daily colace per pts request  - prn dulcolax (received yesterday)  - pt had 3 large bowel movements last night    CONSULTATIONS: IP CONSULT TO THORACIC SURGERY  IP CONSULT TO PULMONOLOGY  IP CONSULT HOME HEALTH    PROCEDURES/SURGERIES: * No surgery found *    PENDING TEST RESULTS:   At the time of discharge the following test results are still pending: none    FOLLOW UP APPOINTMENTS:       Jesus Rodas MD  Internal Medicine Pulmonary Disease [phone] [address] [facility] [address]      Next Steps: Go on 8/29/2024  Instructions: COPD exacerbation follow-up 08/29/2024 10:45 AM (check in @ 10:15 for new patient paperwork) Dr Rodas [facility] [address], [address] [place] [address] P: [phone]    Valeria Min, APRN - NP PCP - Empaneled Provider Nurse Practitioner [phone]

## 2024-08-23 ENCOUNTER — HOSPITAL ENCOUNTER (OUTPATIENT)
Facility: HOSPITAL | Age: 74
Setting detail: OUTPATIENT SURGERY
Discharge: HOME | End: 2024-08-23
Attending: INTERNAL MEDICINE | Admitting: INTERNAL MEDICINE
Payer: MEDICARE

## 2024-08-23 ENCOUNTER — ANESTHESIA EVENT (OUTPATIENT)
Facility: HOSPITAL | Age: 74
End: 2024-08-23
Payer: MEDICARE

## 2024-08-23 ENCOUNTER — ANESTHESIA (OUTPATIENT)
Facility: HOSPITAL | Age: 74
End: 2024-08-23
Payer: MEDICARE

## 2024-08-23 VITALS
HEIGHT: 62 IN | SYSTOLIC BLOOD PRESSURE: 131 MMHG | WEIGHT: 146.8 LBS | TEMPERATURE: 97.8 F | OXYGEN SATURATION: 96 % | RESPIRATION RATE: 21 BRPM | BODY MASS INDEX: 27.02 KG/M2 | HEART RATE: 79 BPM | DIASTOLIC BLOOD PRESSURE: 69 MMHG

## 2024-08-23 PROCEDURE — 88341 IMHCHEM/IMCYTCHM EA ADD ANTB: CPT

## 2024-08-23 PROCEDURE — 2720000010 HC SURG SUPPLY STERILE: Performed by: INTERNAL MEDICINE

## 2024-08-23 PROCEDURE — 88342 IMHCHEM/IMCYTCHM 1ST ANTB: CPT

## 2024-08-23 PROCEDURE — 3700000001 HC ADD 15 MINUTES (ANESTHESIA): Performed by: INTERNAL MEDICINE

## 2024-08-23 PROCEDURE — 3600007512: Performed by: INTERNAL MEDICINE

## 2024-08-23 PROCEDURE — 88172 CYTP DX EVAL FNA 1ST EA SITE: CPT

## 2024-08-23 PROCEDURE — 7100000011 HC PHASE II RECOVERY - ADDTL 15 MIN: Performed by: INTERNAL MEDICINE

## 2024-08-23 PROCEDURE — 7100000010 HC PHASE II RECOVERY - FIRST 15 MIN: Performed by: INTERNAL MEDICINE

## 2024-08-23 PROCEDURE — 2709999900 HC NON-CHARGEABLE SUPPLY: Performed by: INTERNAL MEDICINE

## 2024-08-23 PROCEDURE — 3600007502: Performed by: INTERNAL MEDICINE

## 2024-08-23 PROCEDURE — 3700000000 HC ANESTHESIA ATTENDED CARE: Performed by: INTERNAL MEDICINE

## 2024-08-23 PROCEDURE — 88305 TISSUE EXAM BY PATHOLOGIST: CPT

## 2024-08-23 PROCEDURE — 2500000003 HC RX 250 WO HCPCS: Performed by: NURSE ANESTHETIST, CERTIFIED REGISTERED

## 2024-08-23 PROCEDURE — 6360000002 HC RX W HCPCS: Performed by: NURSE ANESTHETIST, CERTIFIED REGISTERED

## 2024-08-23 PROCEDURE — 88173 CYTOPATH EVAL FNA REPORT: CPT

## 2024-08-23 PROCEDURE — 2580000003 HC RX 258: Performed by: NURSE ANESTHETIST, CERTIFIED REGISTERED

## 2024-08-23 RX ORDER — ROCURONIUM BROMIDE 10 MG/ML
INJECTION, SOLUTION INTRAVENOUS PRN
Status: DISCONTINUED | OUTPATIENT
Start: 2024-08-23 | End: 2024-08-23 | Stop reason: SDUPTHER

## 2024-08-23 RX ORDER — NEOSTIGMINE METHYLSULFATE 1 MG/ML
INJECTION, SOLUTION INTRAVENOUS PRN
Status: DISCONTINUED | OUTPATIENT
Start: 2024-08-23 | End: 2024-08-23 | Stop reason: SDUPTHER

## 2024-08-23 RX ORDER — FENTANYL CITRATE 50 UG/ML
INJECTION, SOLUTION INTRAMUSCULAR; INTRAVENOUS PRN
Status: DISCONTINUED | OUTPATIENT
Start: 2024-08-23 | End: 2024-08-23 | Stop reason: SDUPTHER

## 2024-08-23 RX ORDER — LIDOCAINE HYDROCHLORIDE 20 MG/ML
INJECTION, SOLUTION EPIDURAL; INFILTRATION; INTRACAUDAL; PERINEURAL PRN
Status: DISCONTINUED | OUTPATIENT
Start: 2024-08-23 | End: 2024-08-23 | Stop reason: SDUPTHER

## 2024-08-23 RX ORDER — SODIUM CHLORIDE 9 MG/ML
INJECTION, SOLUTION INTRAVENOUS CONTINUOUS
Status: CANCELLED | OUTPATIENT
Start: 2024-08-23

## 2024-08-23 RX ORDER — PHENYLEPHRINE HCL IN 0.9% NACL 0.4MG/10ML
SYRINGE (ML) INTRAVENOUS PRN
Status: DISCONTINUED | OUTPATIENT
Start: 2024-08-23 | End: 2024-08-23 | Stop reason: SDUPTHER

## 2024-08-23 RX ORDER — SUCCINYLCHOLINE/SOD CL,ISO/PF 200MG/10ML
SYRINGE (ML) INTRAVENOUS PRN
Status: DISCONTINUED | OUTPATIENT
Start: 2024-08-23 | End: 2024-08-23 | Stop reason: SDUPTHER

## 2024-08-23 RX ORDER — SODIUM CHLORIDE 9 MG/ML
INJECTION, SOLUTION INTRAVENOUS PRN
Status: CANCELLED | OUTPATIENT
Start: 2024-08-23

## 2024-08-23 RX ORDER — SODIUM CHLORIDE 0.9 % (FLUSH) 0.9 %
5-40 SYRINGE (ML) INJECTION PRN
Status: CANCELLED | OUTPATIENT
Start: 2024-08-23

## 2024-08-23 RX ORDER — PROPOFOL 10 MG/ML
INJECTION, EMULSION INTRAVENOUS PRN
Status: DISCONTINUED | OUTPATIENT
Start: 2024-08-23 | End: 2024-08-23 | Stop reason: SDUPTHER

## 2024-08-23 RX ORDER — SODIUM CHLORIDE 9 MG/ML
INJECTION, SOLUTION INTRAVENOUS CONTINUOUS PRN
Status: DISCONTINUED | OUTPATIENT
Start: 2024-08-23 | End: 2024-08-23 | Stop reason: SDUPTHER

## 2024-08-23 RX ORDER — SODIUM CHLORIDE 0.9 % (FLUSH) 0.9 %
5-40 SYRINGE (ML) INJECTION EVERY 12 HOURS SCHEDULED
Status: CANCELLED | OUTPATIENT
Start: 2024-08-23

## 2024-08-23 RX ORDER — ONDANSETRON 2 MG/ML
INJECTION INTRAMUSCULAR; INTRAVENOUS PRN
Status: DISCONTINUED | OUTPATIENT
Start: 2024-08-23 | End: 2024-08-23 | Stop reason: SDUPTHER

## 2024-08-23 RX ORDER — GLYCOPYRROLATE 0.2 MG/ML
INJECTION, SOLUTION INTRAMUSCULAR; INTRAVENOUS PRN
Status: DISCONTINUED | OUTPATIENT
Start: 2024-08-23 | End: 2024-08-23 | Stop reason: SDUPTHER

## 2024-08-23 RX ADMIN — GLYCOPYRROLATE 0.4 MG: 0.2 INJECTION, SOLUTION INTRAMUSCULAR; INTRAVENOUS at 12:16

## 2024-08-23 RX ADMIN — FENTANYL CITRATE 50 MCG: 50 INJECTION, SOLUTION INTRAMUSCULAR; INTRAVENOUS at 11:29

## 2024-08-23 RX ADMIN — NEOSTIGMINE METHYLSULFATE 3 MG: 1 INJECTION, SOLUTION INTRAVENOUS at 12:16

## 2024-08-23 RX ADMIN — Medication 80 MCG: at 11:47

## 2024-08-23 RX ADMIN — Medication 100 MG: at 11:30

## 2024-08-23 RX ADMIN — Medication 80 MCG: at 12:19

## 2024-08-23 RX ADMIN — GLYCOPYRROLATE 0.4 MG: 0.2 INJECTION, SOLUTION INTRAMUSCULAR; INTRAVENOUS at 12:25

## 2024-08-23 RX ADMIN — NEOSTIGMINE METHYLSULFATE 2 MG: 1 INJECTION, SOLUTION INTRAVENOUS at 12:25

## 2024-08-23 RX ADMIN — ONDANSETRON 4 MG: 2 INJECTION INTRAMUSCULAR; INTRAVENOUS at 12:16

## 2024-08-23 RX ADMIN — SODIUM CHLORIDE: 9 INJECTION, SOLUTION INTRAVENOUS at 11:00

## 2024-08-23 RX ADMIN — Medication 40 MCG: at 11:40

## 2024-08-23 RX ADMIN — ROCURONIUM BROMIDE 5 MG: 10 INJECTION, SOLUTION INTRAVENOUS at 11:29

## 2024-08-23 RX ADMIN — PROPOFOL 100 MG: 10 INJECTION, EMULSION INTRAVENOUS at 11:29

## 2024-08-23 RX ADMIN — LIDOCAINE HYDROCHLORIDE 60 MG: 20 INJECTION, SOLUTION EPIDURAL; INFILTRATION; INTRACAUDAL; PERINEURAL at 11:29

## 2024-08-23 RX ADMIN — ROCURONIUM BROMIDE 25 MG: 10 INJECTION, SOLUTION INTRAVENOUS at 11:37

## 2024-08-23 ASSESSMENT — PAIN - FUNCTIONAL ASSESSMENT: PAIN_FUNCTIONAL_ASSESSMENT: NONE - DENIES PAIN

## 2024-08-23 NOTE — ANESTHESIA PRE PROCEDURE
Date/Time    COVID19 Not detected 08/11/2024 10:40 PM           Anesthesia Evaluation  Patient summary reviewed  Airway: Mallampati: II     Neck ROM: full  Mouth opening: > = 3 FB   Dental: normal exam         Pulmonary:Negative Pulmonary ROS and normal exam  breath sounds clear to auscultation  (+)  COPD:                                    ROS comment: 2L O2   Cardiovascular:Negative CV ROS  Exercise tolerance: good (>4 METS)  (+) hypertension:                  Neuro/Psych:   Negative Neuro/Psych ROS              GI/Hepatic/Renal: Neg GI/Hepatic/Renal ROS            Endo/Other: Negative Endo/Other ROS                    Abdominal: normal exam            Vascular: negative vascular ROS.         Other Findings:             Anesthesia Plan      general     ASA 4       Induction: intravenous.      Anesthetic plan and risks discussed with patient.                        Jesi Godinez DO   8/23/2024

## 2024-08-23 NOTE — PROGRESS NOTES
Respiratory Therapy  Bronchoscopy Note    Name: Sandra Lerma MRN: [id number]   : 1950     Date: 2024            Procedure: Diagnostic bronchoscopy.  Scope ID: [id number] ([id number])      Indication: Abnormal chest imaging    Consent/Treatment: Timeout verified the correct patient and correct procedure.     Anesthesia:   General anesthesia was performed by anesthesiology    Physician:Jesus Rodas MD   Respiratory Therapist:Bushra Croft RT  Nurse:BERE Garduno ENDO    Specimens:   FNA samples of the 4R LN were collected and sent to the lab by Cytology        Complications: none        RT Bettina  2024  12:43 PM

## 2024-08-23 NOTE — PROCEDURES
PROCEDURE NOTE  Date: 8/23/2024   Name: Sandra Lerma  YOB: 1950    Procedures    Pre-procedure Diagnoses   Mediastinal and R hilar lymphadenopathy        Post-procedure Diagnoses   Mediastinal and R hilar lymphadenopathy        Procedures   EBUS W/BRONCH/INTERVENT [[id number] (Custom)]  Inspection Bronchoscopy.          Signed        Pre-anesthesia assessment was performed. History and physical on the chart. Informed consent for bronchoscopy with general anesthesia was obtained from the patient. The proposed procedure and possible alternatives including the option of no procedure were discussed. The benefits of the proposed procedure and its alternatives were also discussed. The nature and probability of risks of the proposed procedure and its alternatives were discussed. After our discussion, the patient gave informed consent to undergo the procedure and wished to proceed.     A time out was performed prior to the start of procedure and the procedure was verified in the presence of bronchoscopist, RN and anesthesiologist. ASA assessment documented by anesthesiologist. Patients heart rate, BP, respiratory rate and oxygen saturations were monitored during the procedure. RSI was performed and patient was intubated by anesthesiologist.     Surgeon:  Jesus Rodas MD.  Assistant: RT Aranza, Endo nurse.     Inspection Bronchoscopy: After patient was adequately sedated the diagnostic bronchoscope was introduced through the ETT and advanced to the jenna. The left and right tracheobronchial tree was examined upto sub segmental level. Mild secretions. The RUL had endobronchial lesion with almost complete obstruction. Bronchoscopy could not be passed.     EBUS: The EBUS was introduced into trachea and advanced to DILMA and Pulled back and at 1 O Clock large 4R LN identified. 5 passes with 25G needle followed by 21G x 4. NIKO: adequate. Patient tolerated the procedure well.  No immediate  complications.     Sample collected:  -EBUS 4R - 25G TBNA x 5, 21g x 4..       EBL: 5 ml.    Complication: None.

## 2024-08-23 NOTE — PROGRESS NOTES
Patient Discharge Instructions    Sandra Lerma / [id number] : 1950    Admitted 2024 Discharged: 2024 12:19 PM     ACUTE DIAGNOSES:  Mediastinal lymphadenopathy [R59.0]    CHRONIC MEDICAL DIAGNOSES:  Patient Active Problem List   Diagnosis    Anxiety and depression    Former smoker    S/P colonoscopic polypectomy    BMI 34.0-34.9,adult    Cigarette nicotine dependence without complication    Essential hypertension with goal blood pressure less than 140/90    Hypertriglyceridemia without hypercholesterolemia    Primary osteoarthritis of right knee    Vitamin D deficiency    Osteopenia of right upper arm    COPD (chronic obstructive pulmonary disease) (HCC)       DISCHARGE MEDICATIONS:      Medication List        ASK your doctor about these medications      ALPRAZolam 0.5 MG tablet  Commonly known as: XANAX     amLODIPine-atorvastatatin 10-80 MG per tablet  Commonly known as: CADUET  TAKE ONE TABLET BY MOUTH DAILY     aspirin 81 MG EC tablet     calcium carbonate 500 MG chewable tablet  Commonly known as: TUMS  CHEW 1 TABLET BY MOUTH TWICE A DAY     diclofenac 75 MG EC tablet  Commonly known as: VOLTAREN     ergocalciferol 1.25 MG (34138 UT) capsule  Commonly known as: ERGOCALCIFEROL  Take 1 capsule by mouth every 7 days     famotidine 40 MG tablet  Commonly known as: PEPCID     Icosapent Ethyl 1 g Caps capsule  Commonly known as: VASCEPA     lidocaine 5 %  Commonly known as: LIDODERM     linaclotide 145 MCG capsule  Commonly known as: LINZESS     mirtazapine 15 MG tablet  Commonly known as: REMERON     PARoxetine 12.5 MG extended release tablet  Commonly known as: PAXIL CR     predniSONE 20 MG tablet  Commonly known as: DELTASONE  Take 2 tablets by mouth daily for 3 days, THEN 1 tablet daily for 3 days, THEN 0.5 tablets daily for 3 days.  Start taking on: 2024     traZODone 100 MG tablet  Commonly known as: DESYREL              It is important that you take the medication exactly as  they are prescribed.   Keep your medication in the bottles provided by the pharmacist and keep a list of the medication names, dosages, and times to be taken in your wallet.   Do not take other medications without consulting your doctor.       DIET:  regular diet    ACTIVITY: activity as tolerated and no driving for today    ADDITIONAL INFORMATION: If you experience any of the following symptoms then please call your primary care physician or return to the emergency room if you cannot get hold of your doctor: Fever, chills, nausea, vomiting, diarrhea, change in mentation, falling, bleeding, shortness of breath.    SPECIAL INSTRUCTIONS:    FOLLOW UP CARE:  Dr. Rodas you are to call and set up an appointment to see them in 1 weeks.      Information obtained by :  I understand that if any problems occur once I am at home I am to contact my physician.    I understand and acknowledge receipt of the instructions indicated above.                                                                                                                                           Physician's or R.N.'s Signature                                                                  Date/Time                                                                                                                                              Patient or Representative Signature                                                          Date/Time

## 2024-08-23 NOTE — H&P
PCCM:  =====  H and P reviewed.  Patient was examined.  No changes.  Procedure discussed and patient is agreeable.

## 2024-08-23 NOTE — ANESTHESIA POSTPROCEDURE EVALUATION
Department of Anesthesiology  Postprocedure Note    Patient: Sandra Lerma  MRN: [id number]  YOB: 1950  Date of evaluation: 8/23/2024    Procedure Summary       Date: 08/23/24 Room / Location: [facility] ENDO 03 / [facility] ENDOSCOPY    Anesthesia Start: 1122 Anesthesia Stop: 1230    Procedure: ENDOBRONCHIAL BRONCHOSCOPY  ULTRASOUND WITH SALINE IV Diagnosis:       Mediastinal lymphadenopathy      (Mediastinal lymphadenopathy [R59.0])    Surgeons: Jesus Rodas MD Responsible Provider: Jesi Godinez DO    Anesthesia Type: general ASA Status: 4            Anesthesia Type: No value filed.    Johnathon Phase I: Johnathon Score: 10    Johnathon Phase II: Johnathon Score: 9    Anesthesia Post Evaluation    Patient location during evaluation: PACU  Level of consciousness: awake  Airway patency: patent  Nausea & Vomiting: no nausea  Cardiovascular status: hemodynamically stable  Respiratory status: acceptable  Hydration status: stable  Multimodal analgesia pain management approach  Pain management: adequate    No notable events documented.

## 2024-08-23 NOTE — DISCHARGE INSTRUCTIONS
Jesus Rodas MD  Physician  Pulmonology     Progress Notes      Signed     Date of Service: 2024 12:18 PM     Signed       Expand All Collapse All                 Patient Discharge Instructions     Sandra Lerma / [id number] : 1950     Admitted 2024 Discharged: 2024 12:19 PM      ACUTE DIAGNOSES:  Mediastinal lymphadenopathy [R59.0]     CHRONIC MEDICAL DIAGNOSES:  Problem List       Patient Active Problem List   Diagnosis    Anxiety and depression    Former smoker    S/P colonoscopic polypectomy    BMI 34.0-34.9,adult    Cigarette nicotine dependence without complication    Essential hypertension with goal blood pressure less than 140/90    Hypertriglyceridemia without hypercholesterolemia    Primary osteoarthritis of right knee    Vitamin D deficiency    Osteopenia of right upper arm    COPD (chronic obstructive pulmonary disease) ([facility])            DISCHARGE MEDICATIONS:       Medication List          ASK your doctor about these medications       ALPRAZolam 0.5 MG tablet  Commonly known as: XANAX      amLODIPine-atorvastatatin 10-80 MG per tablet  Commonly known as: CADUET  TAKE ONE TABLET BY MOUTH DAILY      aspirin 81 MG EC tablet      calcium carbonate 500 MG chewable tablet  Commonly known as: TUMS  CHEW 1 TABLET BY MOUTH TWICE A DAY      diclofenac 75 MG EC tablet  Commonly known as: VOLTAREN      ergocalciferol 1.25 MG (90851 UT) capsule  Commonly known as: ERGOCALCIFEROL  Take 1 capsule by mouth every 7 days      famotidine 40 MG tablet  Commonly known as: PEPCID      Icosapent Ethyl 1 g Caps capsule  Commonly known as: VASCEPA      lidocaine 5 %  Commonly known as: LIDODERM      linaclotide 145 MCG capsule  Commonly known as: LINZESS      mirtazapine 15 MG tablet  Commonly known as: REMERON      PARoxetine 12.5 MG extended release tablet  Commonly known as: PAXIL CR      predniSONE 20 MG tablet  Commonly known as: DELTASONE  Take 2 tablets by mouth daily for 3 days, THEN 1 tablet

## 2024-09-01 PROBLEM — I31.39 PERICARDIAL EFFUSION: Status: ACTIVE | Noted: 2024-09-01

## 2024-09-01 PROBLEM — I30.9 PERICARDIAL EFFUSION, ACUTE: Status: ACTIVE | Noted: 2024-09-01

## 2024-09-01 NOTE — CONSULTS
Cardiology Consult Note    CC: SOB  Reason for consult:  tamponade  Requesting MD:  Dread Min, JIAN - NP     Subjective:      Date of  Admission: 9/1/2024  3:04 PM     Admission type:Emergency    Sandra Lerma is a 74 y.o. female admitted for Pericardial effusion [I31.39]  ST elevation myocardial infarction (STEMI), unspecified artery (HCC) [I21.3].Patient complains of progressively worsening SOB over last two or more weeks but much worse last two days. She was admitted two weeks ago with PNA and lung mass which was fine needle biopsied and it came back as metastatic cancer of urothelial cell type. She still smokes and she lost 15 pounds or more over last two months. Initially, STEMI was called looking at EKG but it shows diffuse ST elevation suggestive of pericarditis. She denies any CP or palpitations.    Patient Active Problem List    Diagnosis Date Noted    Pericardial effusion 09/01/2024    COPD (chronic obstructive pulmonary disease) (HCC) 08/11/2024    Osteopenia of right upper arm 10/30/2023    Vitamin D deficiency 10/21/2022    S/P colonoscopic polypectomy 10/21/2019    Former smoker 07/30/2019    BMI 34.0-34.9,adult 01/29/2018    Anxiety and depression 05/15/2017    Cigarette nicotine dependence without complication 04/25/2017    Essential hypertension with goal blood pressure less than 140/90 04/20/2016    Hypertriglyceridemia without hypercholesterolemia 04/20/2016    Primary osteoarthritis of right knee 04/20/2016      Valeria Min, JIAN - NP  Past Medical History:   Diagnosis Date    Anxiety     Arthritis     knees    COPD (chronic obstructive pulmonary disease) (HCC)     High cholesterol     Hypertension     Psychiatric disorder     anxiety      Past Surgical History:   Procedure Laterality Date    BRONCHOSCOPY N/A 8/23/2024    ENDOBRONCHIAL BRONCHOSCOPY  ULTRASOUND WITH SALINE IV performed by Jesus Rodas MD at [facility] ENDOSCOPY    COLONOSCOPY N/A 10/15/2019    COLONOSCOPY performed by  WNL for age.  Ext: No cyanosis, clubbing, or stigmata of peripheral embolization.   Derm: No ulcers or stasis dermatitis of lower extremities.   Neuro: Alert and oriented x 3;  Grossly non-focal. Normal mood and affect.       Cardiographics    Telemetry: sinus tachycardia  ECG: sinus tachycardia  Echocardiogram: abnormal and reviewed by myself; it shows large pericardia effusion with early tamponade physiology    Labs:   Recent Results (from the past 24 hour(s))   EKG 12 Lead    Collection Time: 09/01/24  2:56 PM   Result Value Ref Range    Ventricular Rate 119 BPM    Atrial Rate 119 BPM    P-R Interval 202 ms    QRS Duration 74 ms    Q-T Interval 306 ms    QTc Calculation (Bazett) 430 ms    P Axis 72 degrees    R Axis -4 degrees    T Axis 77 degrees    Diagnosis       Critical Test Result: STEMI  Sinus tachycardia  Low voltage QRS  Cannot rule out Anterior infarct , new  Inferolateral injury pattern  ** ** ACUTE MI / STEMI ** **  Abnormal ECG  When compared with ECG of 11-AUG-2024 20:03,  QRS duration has decreased  QRS voltage has decreased  Acute Anterior infarct is now present  Confirmed by Humberto NICOLE, [facility] ([id number]) on 9/1/2024 3:42:34 PM     Extra Tubes Hold    Collection Time: 09/01/24  3:12 PM   Result Value Ref Range    Specimen HOld 1RED 1BLU     Comment:        Add-on orders for these samples will be processed based on acceptable specimen integrity and analyte stability, which may vary by analyte.   CBC with Auto Differential    Collection Time: 09/01/24  3:12 PM   Result Value Ref Range    WBC 10.6 3.6 - 11.0 K/uL    RBC 3.88 3.80 - 5.20 M/uL    Hemoglobin 10.5 (L) 11.5 - 16.0 g/dL    Hematocrit 32.8 (L) 35.0 - 47.0 %    MCV 84.5 80.0 - 99.0 FL    MCH 27.1 26.0 - 34.0 PG    MCHC 32.0 30.0 - 36.5 g/dL    RDW 15.5 (H) 11.5 - 14.5 %    Platelets 346 150 - 400 K/uL    MPV 12.1 8.9 - 12.9 FL    Nucleated RBCs 0.0 0  WBC    nRBC 0.00 0.00 - 0.01 K/uL    Neutrophils % 84 (H) 32 - 75 %    Lymphocytes % 9

## 2024-09-01 NOTE — H&P
ICU Admission H&P      Name: Sandra Lerma   : 1950   MRN: [id number]   Date: 2024      Assessment:   Brief patient summary:  73 y/o female presented to the ED with SOB and chest pain. Initially EKG changes suggestive of STEMI but on further exam pt discovered to have pericardial effusion and EKG findings consistent with pericarditis. She was taken by cardiology for pericardiocentesis and then transferred to the CCU for observation and further management.    PMH: HTN, COPD, RA. MDD with MAHOGANY, work up for cancer in progress.  Recent fine needel biopsy of lung mass demonstrated  metastatic cancer of urothelial cell type.      Critical Care Problems    Patient Active Problem List   Diagnosis    Anxiety and depression    Former smoker    S/P colonoscopic polypectomy    BMI 34.0-34.9,adult    Cigarette nicotine dependence without complication    Essential hypertension with goal blood pressure less than 140/90    Hypertriglyceridemia without hypercholesterolemia    Primary osteoarthritis of right knee    Vitamin D deficiency    Osteopenia of right upper arm    COPD (chronic obstructive pulmonary disease) (HCC)    Pericardial effusion    Pericardial effusion, acute         Plan    Neuro  H/O MDD and MAHOGANY  - continue prn xanax    CVS  S/p pericardiocentesis  - pericardial effusion most likely secondary to malignancy  - trend daily eKG  - MAP goal > 65  - h/o HTN and takes amlodipine at home 10 mg; hold antihypertensives for now      Pulm  H/o COPD  - was not on any inhalers at home  - currently asymptomatic; no SOB    GI  - regular diet    Renal  - hyponatremia, continue to trend  - BUN/CR 15/0.89    Endo  - BS goal 100-180  - Calcium 10.5; pt has h/o     ID & Hematology  - wbc wnl    Heme  - H/H stable 10.6/32.8 was 11.1/37 on 24      ICU Comprehensive Plan of Care:     Plans for this Shift:     Telemetry  Keep MAP > 65  Monitor pericardial drainage  Monitor lytes        HPI/Consult/Subjective:   HPI:  74

## 2024-09-01 NOTE — PROGRESS NOTES
Spiritual Health Assessment/Progress Note  [facility]    Crisis, Code STEMI,  ,      Name: Sandra Lerma MRN: [id number]    Age: 74 y.o.     Sex: female   Language: English   Buddhism: Yazdanism   Pericardial effusion     Date: 9/1/2024            Total Time Calculated: 30 min              Spiritual Assessment began in [facility] CARDIAC CATH/EP LAB        Referral/Consult From: Other (comment) (CODE)   Encounter Overview/Reason: Crisis  Service Provided For: Patient and family together    La[place], Belief, Meaning:   Patient is connected with a lavern tradition or spiritual practice  Family/Friends are connected with a lavern tradition or spiritual practice      Importance and Influence:  Patient has spiritual/personal beliefs that influence decisions regarding their health  Family/Friends have spiritual/personal beliefs that influence decisions regarding the patient's health    Community:  Patient feels well-supported. Support system includes: Children  Family/Friends feel well-supported. Support system includes: Friends    Assessment and Plan of Care:     Patient Interventions include: Facilitated expression of thoughts and feelings, Explored spiritual coping/struggle/distress and theological reflection, and Affirmed coping skills/support systems  Family/Friends Interventions include: Explored spiritual coping/struggle/distress and theological reflection and Affirmed coping skills/support systems    Patient Plan of Care: Spiritual Care available upon further referral  Family/Friends Plan of Care: Spiritual Care available upon further referral    Electronically signed by Margarita Monlain Resident on 9/1/2024 at 4:06 PM

## 2024-09-01 NOTE — ED PROVIDER NOTES
[facility] EMERGENCY DEP  EMERGENCY DEPARTMENT ENCOUNTER      Pt Name: Sandra Lerma  MRN: [id number]  Birthdate 1950  Date of evaluation: 9/1/2024  Provider: Bryan Rocha MD      HISTORY OF PRESENT ILLNESS      74 year old female with history of HTN and COPD presents to the ED with short of breath and chest pressure for 3 days. She tells me her doctor is working her up for cancer.     Recent admission for respiratory distress and found to have pulmonary masses concerning for malignancy, urothelial cells on pulmonary lymph node biopsy last week.    The history is provided by the patient, medical records and a relative.           Nursing Notes were reviewed.    REVIEW OF SYSTEMS         Review of Systems        PAST MEDICAL HISTORY     Past Medical History:   Diagnosis Date    Anxiety     Arthritis     knees    COPD (chronic obstructive pulmonary disease) (HCC)     High cholesterol     Hypertension     Psychiatric disorder     anxiety         SURGICAL HISTORY       Past Surgical History:   Procedure Laterality Date    BRONCHOSCOPY N/A 8/23/2024    ENDOBRONCHIAL BRONCHOSCOPY  ULTRASOUND WITH SALINE IV performed by Jesus Rodas MD at [facility] ENDOSCOPY    COLONOSCOPY N/A 10/15/2019    COLONOSCOPY performed by Benito Zhu MD at [facility] ENDOSCOPY         CURRENT MEDICATIONS       Previous Medications    ALPRAZOLAM (XANAX) 0.5 MG TABLET    Take 1 tablet by mouth 2 times daily as needed for Anxiety.    AMLODIPINE-ATORVASTATATIN (CADUET) 10-80 MG PER TABLET    TAKE ONE TABLET BY MOUTH DAILY    ASPIRIN 81 MG EC TABLET    Take 1 tablet by mouth daily    CALCIUM CARBONATE (TUMS) 500 MG CHEWABLE TABLET    CHEW 1 TABLET BY MOUTH TWICE A DAY    DICLOFENAC (VOLTAREN) 75 MG EC TABLET    TAKE 1 TABLET TWICE DAILY WITH FOOD    ERGOCALCIFEROL (ERGOCALCIFEROL) 1.25 MG (48995 UT) CAPSULE    Take 1 capsule by mouth every 7 days    FAMOTIDINE (PEPCID) 40 MG TABLET    Take 1 tablet by mouth daily    ICOSAPENT ETHYL (VASCEPA)  1 G CAPS CAPSULE    Take 2 capsules by mouth 2 times daily    LIDOCAINE (LIDODERM) 5 %    Apply 1 patch to the affected area for 12 hours a day and remove for 12 hours a day.    LINACLOTIDE (LINZESS) 145 MCG CAPSULE    ceived the following from [facility]: Outside name: Linzess 145 mcg cap capsule    MIRTAZAPINE (REMERON) 15 MG TABLET    Take 1 tablet by mouth nightly    PAROXETINE (PAXIL CR) 12.5 MG EXTENDED RELEASE TABLET        TRAZODONE (DESYREL) 100 MG TABLET    Take 1 tablet by mouth nightly       ALLERGIES     Fluoxetine    FAMILY HISTORY       Family History   Problem Relation Age of Onset    Hypertension Paternal Grandmother     Hypertension Maternal Grandfather     Hypertension Maternal Grandmother     Cancer Father         lung    Cancer Mother         throat    Hypertension Paternal Grandfather           SOCIAL HISTORY       Social History     Socioeconomic History    Marital status:    Tobacco Use    Smoking status: Some Days     Current packs/day: 0.00     Types: Cigarettes     Last attempt to quit: 2019     Years since quittin.1    Smokeless tobacco: Never   Substance and Sexual Activity    Alcohol use: No    Drug use: Never    Sexual activity: Not Currently     Social Determinants of Health     Financial Resource Strain: Low Risk  (2023)    Overall Financial Resource Strain (CARDIA)     Difficulty of Paying Living Expenses: Not hard at all   Transportation Needs: Unknown (2023)    PRAPARE - Transportation     Lack of Transportation (Non-Medical): No   Physical Activity: Sufficiently Active (10/3/2023)    Exercise Vital Sign     Days of Exercise per Week: 4 days     Minutes of Exercise per Session: 60 min   Housing Stability: Unknown (2023)    Housing Stability Vital Sign     Unstable Housing in the Last Year: No         PHYSICAL EXAM       ED Triage Vitals   BP Systolic BP Percentile Diastolic BP Percentile Temp Temp src Pulse Resp SpO2   -- -- -- -- -- --

## 2024-09-01 NOTE — ED NOTES
Pt comes from home with CC of chest pain. STEMI called during triage. MD Eliseo and MD Ryann at bedside.

## 2024-09-02 NOTE — PROGRESS NOTES
encounter with the patient, reviewed and interpreted patient data including clinical events, labs, images, vital signs, I/O's, and examined patient.  I have discussed the case and the plan and management of the patient's care with the consulting services, the bedside nurses and the respiratory therapist.      NOTE OF PERSONAL INVOLVEMENT IN CARE   This patient has a high probability of imminent, clinically significant deterioration, which requires the highest level of preparedness to intervene urgently. I participated in the decision-making and personally managed or directed the management of the following life and organ supporting interventions that required my frequent assessment to treat or prevent imminent deterioration.    I personally spent 55 minutes of critical care time.  This is time spent at this critically ill patient's bedside actively involved in patient care as well as the coordination of care.  This does not include any procedural time which has been billed separately.    James Sequeira   Staff Intensivist/Infectious Disease  [facility]  9/2/2024

## 2024-09-02 NOTE — PROGRESS NOTES
Spiritual Health Assessment/Progress Note  [facility]    Follow-up, Code STEMI,  ,      Name: Sandra Lerma MRN: [id number]    Age: 74 y.o.     Sex: female   Language: English   Catholic: Moravian   Pericardial effusion     Date: 9/2/2024            Total Time Calculated: 10 min              Spiritual Assessment continued in [facility] CORONARY CARE        Referral/Consult From: Rounding   Encounter Overview/Reason: Follow-up  Service Provided For: Family, Patient not available    Lavern, Belief, Meaning:   Patient unable to communicate at this time  Family/Friends identify as spiritual, are connected with a lavern tradition or spiritual practice, and have beliefs or practices that help with coping during difficult times      Importance and Influence:  Patient unable to communicate at this time  Family/Friends have spiritual/personal beliefs that influence decisions regarding the patient's health and have no beliefs influential to healthcare decision-making identified during this visit    Community:  Patient Other: NA  Family/Friends are connected with a spiritual community: and feel well-supported. Support system includes: Children, Lavern Community, and Friends    Assessment and Plan of Care:     Patient Interventions include: Other: NA  Family/Friends Interventions include: Explored spiritual coping/struggle/distress and theological reflection and Affirmed coping skills/support systems    Patient Plan of Care: Spiritual Care available upon further referral  Family/Friends Plan of Care: Spiritual Care available upon further referral    Electronically signed by Chaplain Elieser Resident on 9/2/2024 at 10:53 AM

## 2024-09-02 NOTE — PROGRESS NOTES
Cardiology Progress Note  2024     Admit Date: 2024  Admit Diagnosis: Pericardial effusion [I31.39]  ST elevation myocardial infarction (STEMI), unspecified artery (HCC) [I21.3]  Pericardial effusion, acute [I30.9]  CC: none currently    Assessment:   Principal Problem:    Pericardial effusion  Active Problems:    Pericardial effusion, acute  Resolved Problems:    * No resolved hospital problems. *    Plan:     Echo pending  Added ASA, colchicine  Cont to monitor  Holding anti-HTN for now    Total critical care time - 30 minutes (CPT 61142)     MDM:  High  Risk of decompensation:  High     I personally spent the above critical care time.  This is time spent at this critically ill patient's bedside / unit / floor actively involved in patient care as well as the coordination of care and discussions with the patient's family.  This does not include any procedural time which has been billed separately.    Subjective:      Sandra Lerma stable. Still with pain      Objective:    Physical Exam:  Overall VSSAF;  BP (!) 85/53   Pulse 86   Temp 98.3 °F (36.8 °C) (Oral)   Resp (!) 36   Ht 1.575 m (5' 2\")   Wt 67.4 kg (148 lb 9.4 oz)   SpO2 97%   BMI 27.18 kg/m²   Temp (24hrs), Av.3 °F (36.8 °C), Min:97.8 °F (36.6 °C), Max:98.8 °F (37.1 °C)    Patient Vitals for the past 8 hrs:   Pulse   24 0900 86   24 0800 88   24 0700 87   24 0600 92   24 0500 (!) 111   24 0400 92   24 0300 98    Patient Vitals for the past 8 hrs:   Resp   24 0900 (!) 36   24 0800 (!) 35   24 0700 (!) 35   24 0600 (!) 38   24 0500 22   24 0400 (!) 34   24 0300 (!) 38    Patient Vitals for the past 8 hrs:   BP   24 0900 (!) 85/53   24 0800 91/62   24 0700 (!) 84/57   24 0600 (!) 82/56   24 0500 (!) 160/72   24 0400 (!) 89/58   24 0300 (!) 89/57       1901 -  07  In: 34.1 [I.V.:34.1]  Out: 5947  [Urine:1300; Drains:545]      General Appearance: Well developed, well nourished, no acute distress.   Ears/Nose/Mouth/Throat:   Normal MM; anicteric.     JVP: WNL   Resp:   Lungs clear to auscultation bilaterally.  Nl resp effort.   Cardiovascular:  RRR, S1, S2 normal, no new murmur. No gallop or rub.   Abdomen:   Soft, non-tender, bowel sounds are present.   Extremities: No edema bilaterally.    Skin:  Neuro: Warm and dry.  A/O x3, grossly nonfocal                         Data Review:     Telemetry independently reviewed :   NSR       ECG independently reviewed: NSR  Labs:   reviewed   Current medications reviewed       Montez Fontana MD

## 2024-09-02 NOTE — PROGRESS NOTES
0730 Bedside and Verbal shift change report given to Yamil RN/ Lavern RN (oncoming nurse) by James RN (offgoing nurse). Report included the following information Nurse Handoff Report, ED Encounter Summary, Adult Overview, Intake/Output, MAR, Recent Results, and Cardiac Rhythm NSR .      0945 Intensivist Dr. Sequeira at bedside, family updated on pt condition    1000 Cardiology Dr. Fontana at bedside, new orders received     1410 RT at to perform ABG, p02 57, Dr. Crane made aware, orders received to place pt on hi-flow     1420 Echo tech at bedside     1435 Pt placed on HHFNC at 40L and 40%     Bedside and Verbal shift change report given to Remigio RN (oncoming nurse) by Yamil RN/ Lavern RN (offgoing nurse). Report included the following information Nurse Handoff Report, Adult Overview, Intake/Output, MAR, Recent Results, and Cardiac Rhythm Sinus Tach w/ ST elevation .

## 2024-09-02 NOTE — PROGRESS NOTES
Verbal bedside shift change report given to James (oncoming nurse) by Yojana (offgoing nurse). Report included the following information Nurse Handoff Report, Index, ED Encounter Summary, Adult Overview, Surgery Report, Intake/Output, MAR, Recent Results, Cardiac Rhythm Sinus tachy, ST elevation, and Alarm Parameters.      2030 Family at bedside. Performed med rec. Intensivist at bedside to speak with family  2300 Attempted to obtain troponin. Patient agitated after failed blood draw, did not want further attempts. Intensivist ok to wait until morning  0006 Patient feeling increasingly anxious, intensivist ordered 0.5mg ativan IV PRN Q4 x3  0145 Patient began to panic, trying to get out of bed. Intensivist ordered precedex  0311 Obtained 12 lead EKG due to increasing ST elevation on monitor. Troponin 14  0504 Patient awoke very anxious, confused, trying to get out of bed. Administered PRN ativan, increased precedex. Intensivist ordered 5mg haldol, but patient became sedated before able to administer it     0730 Report to Gricelda

## 2024-09-02 NOTE — PROGRESS NOTES
4 Eyes Skin Assessment     NAME:  Sandra eLrma  YOB: 1950  MEDICAL RECORD NUMBER:  [id number]    The patient is being assessed for  Admission    I agree that at least one RN has performed a thorough Head to Toe Skin Assessment on the patient. ALL assessment sites listed below have been assessed.      Areas assessed by both nurses:    Head, Face, Ears, Shoulders, Back, Chest, Arms, Elbows, Hands, Sacrum. Buttock, Coccyx, Ischium, Legs. Feet and Heels, and Under Medical Devices         Does the Patient have a Wound? No noted wound(s)       Faisal Prevention initiated by RN: No  Wound Care Orders initiated by RN: No    Pressure Injury (Stage 3,4, Unstageable, DTI, NWPT, and Complex wounds) if present, place Wound referral order by RN under : No    New Ostomies, if present place, Ostomy referral order under : No     Nurse 1 eSignature: Electronically signed by James Banda RN on 9/2/24 at 4:48 AM EDT    **SHARE this note so that the co-signing nurse can place an eSignature**    Nurse 2 eSignature: {Esignature:[id number]}

## 2024-09-03 NOTE — PROGRESS NOTES
[facility]  [address], [address] [facility] [address]  W: [phone]  F: [phone]    Reason for Evaluation:   Sandra Lerma is a 74 y.o. female with HTN and COPD who is seen in consultation at the request of Dr. Sequeira for evaluation of metastatic carcinoma.    Hematology/Oncology Treatment History:     PRIMARY DIAGNOSIS: Urothelial carcinoma  DATE OF DIAGNOSIS:  8/23/24  ORIGINAL STAGE: metastatic  DIAGNOSTICS:   4R lymph node biopsy: metastatic carcinoma, favor urothelial origin    SITES OF DISEASE: Mediastinal lymph nodes, staging pending   PRIOR TREATMENT: None  CURRENT TREATMENT: None    History of Present Illness:   Sandra Lerma is a pleasant 74 y.o. female who presents today for evaluation of metastatic carcinoma.    Patient was recently hospitalized from 8/11 - 8/16 for multifocal pneumonia and COPD exacerbation. She was treated with a course of antibiotics and prednisone taper an discharged on supplemental oxygen. During that admission, CTA chest on 8/11 showed multiple prominent enlarged mediastinal lymph nodes, including R paratracheal alan mass (4.9 cm, subcarinal 4.1 cm) as well as small right pleural effusion. Underwent EBUS with fine needle biopsy of mediastinal lymph node on 8/23/24. Path confirmed metastatic carcinoma, favor urothelial origin.     Re-presented to [facility]. with 3 day history of chest pain and shortness of breath. Initial EKG with concern for STEMI but subsequently found to have diffsue ST changes c/w pericarditis as well as large pericardial effusion. She is currently admitted to CCU. Underwent pericardiocentesis with Cardiology. Cytology pending.     She is currently in the ICU on HFNC with escalating oxygen requirements.    Social History:  Current smoker. Previously lived alone and was capable of all ADLs; has had substantial decline since last 2 months.    Review of systems was obtained and pertinent findings reviewed above. Past medical history,  lymph node biopsy with her at this time although stated that she thought it was cancer.   - CT A/P when clinically stable to assess tumor burden  - Palliative Care consult for GOC  - Patient is not a candidate for systemic cancer-directed therapy    #) Large pericardial effusion  Pericarditis  Status post pericardiocentesis. Suspect malignant effusion  - Follow up cytology  - Further management of effusion/pericarditis per Cardiology    #) Acute hypoxic respiratory distress:  Suspected due to COPD and mediastinal adenopathy. On HFNC with increasing oxygen requirements.  - Management per CCU    #) Delirium   Dementia:  Chronically on benzodiazepines. Management per CCU.     Case discussed with family, bedside nurse, intensivist, and Palliative Care. Thank you for involving me in the care of this patient. Oncology will follow along.    Signed By: Kim Garcia MD

## 2024-09-03 NOTE — PROGRESS NOTES
10/10/22   Automatic Reconciliation, Ar       Allergies/Social/Family History:     Allergies   Allergen Reactions    Fluoxetine Anxiety     Irritable, restless      Social History     Tobacco Use    Smoking status: Some Days     Current packs/day: 0.00     Types: Cigarettes     Last attempt to quit: 2019     Years since quittin.1    Smokeless tobacco: Never   Substance Use Topics    Alcohol use: No      Family History   Problem Relation Age of Onset    Hypertension Paternal Grandmother     Hypertension Maternal Grandfather     Hypertension Maternal Grandmother     Cancer Father         lung    Cancer Mother         throat    Hypertension Paternal Grandfather            Objective:   Vital Signs:  BP (!) 141/89   Pulse (!) 137   Temp 99.6 °F (37.6 °C) (Oral)   Resp 21   Ht 1.575 m (5' 2\")   Wt 67.1 kg (147 lb 14.9 oz)   SpO2 95%   BMI 27.06 kg/m²      Temp (24hrs), Av.3 °F (37.4 °C), Min:98.4 °F (36.9 °C), Max:100.2 °F (37.9 °C)        General; moving all extremities, agitated  Cardiovascular; S1-S2, RRR  Lungs; diminished bilaterally  Abdomen; soft nontender nondistended    Intake/Output:     Intake/Output Summary (Last 24 hours) at 9/3/2024 1915  Last data filed at 9/3/2024 1600  Gross per 24 hour   Intake 59.66 ml   Output 265 ml   Net -205.34 ml         LABS AND  DATA: Personally reviewed  Recent Labs     24  0323 24  0453   WBC 6.8 8.2   HGB 9.8* 9.7*   HCT 31.0* 30.6*    275     Recent Labs     24  1012 24  0453   * 137   K 3.9 3.8   CL 96* 98   CO2 36* 38*   BUN 10 11   MG  --  1.9     Recent Labs     24  1512 24  0453   GLOB 4.6* 3.3     No results for input(s): \"INR\", \"APTT\" in the last 72 hours.    Invalid input(s): \"PTP\"   Invalid input(s): \"PHI\", \"PCO2I\", \"PO2I\", \"FIO2I\"  No results for input(s): \"CPK\", \"CKMB\" in the last 72 hours.    Invalid input(s): \"TROIQ\", \"BNPP\"    Ventilator Settings:  Mode Rate Tidal Volume Pressure FiO2 PEEP                     Peak airway pressure:      Minute ventilation:          MEDS: Reviewed    Chest X-Ray:  Reviewed      CRITICAL CARE CONSULTANT NOTE  I had a face to face encounter with the patient, reviewed and interpreted patient data including clinical events, labs, images, vital signs, I/O's, and examined patient.  I have discussed the case and the plan and management of the patient's care with the consulting services, the bedside nurses and the respiratory therapist.      NOTE OF PERSONAL INVOLVEMENT IN CARE   This patient has a high probability of imminent, clinically significant deterioration, which requires the highest level of preparedness to intervene urgently. I participated in the decision-making and personally managed or directed the management of the following life and organ supporting interventions that required my frequent assessment to treat or prevent imminent deterioration.    I personally spent 55 minutes of critical care time.  This is time spent at this critically ill patient's bedside actively involved in patient care as well as the coordination of care.  This does not include any procedural time which has been billed separately.    James Sequeira   Staff Intensivist/Infectious Disease  [facility]  9/3/2024

## 2024-09-03 NOTE — PROGRESS NOTES
Referral source:   Sandra Lerma at [facility] in [facility] CORONARY CARE.  attended rounds in the CCU as part of the Interdisciplinary team where the patient's ongoing care was discussed. I reviewed the medical record as part of this encounter.     Outcome: Interdisciplinary team are aware of  availability and were encouraged to request Spiritual Health support as needed.      The  on-call can be reached at [phone]).     Rev. Shu Layton MDiv, [facility]  Staff

## 2024-09-03 NOTE — PROGRESS NOTES
Palliative Medicine   [place] [phone] (COPE)        [facility] [phone] (COPE)        Palliative Medicine consultation received and appreciated- our team discussed w/ Oncology as well as ICU team.     Family awaiting results of scans- ICU planning to have additional conversations w/ patient's family today, awaiting daughter arrival to hospital.     Our team will follow-up with patient and family tomorrow, 9/4, for ongoing goals of care conversations and support.       Thank you for including Palliative Medicine in the care of Sandra Hodgson LCSW

## 2024-09-03 NOTE — PROGRESS NOTES
Day #1 of Ceftriaxone  Indication:  CAP  Current regimen:  2g IV q24h x 5 days  Abx regimen: monotherapy  Recent Labs     24  1512 24  0323 24  1012 24  0453   WBC 10.6 6.8  --  8.2   CREATININE 0.89 0.61 0.77 0.71   BUN 15 11 10 11     Est CrCl: 63 ml/min; UO: -- ml/kg/hr  Temp (24hrs), Av.2 °F (37.3 °C), Min:98.4 °F (36.9 °C), Max:100.2 °F (37.9 °C)    Cultures:    pericardial fluid: NGTD, prelim      Plan: Change to 1g IV q24h x 5 days per [facility] P&T protocol for indication.          Megan Elizondo, PharmD, BCPS

## 2024-09-03 NOTE — PROGRESS NOTES
1930 - Bedside shift change report given to BERE Flood (oncoming nurse) by BERE Hill (offgoing nurse). Report included the following information Nurse Handoff Report, Adult Overview, Intake/Output, MAR, Recent Results, Cardiac Rhythm ST w/ST elevation, Alarm Parameters, Neuro Assessment, and Event Log.     2125 - Precedex started due to agitation. Pt unable to tolerate PO medications, coughing and spitting medication out. Holding PO medications at this time, MD aware.    0530 - MD at bedside to assess Pt due to low BP, Precedex turned off at this time, MAP now >65.    0730 - Bedside shift change report given to BERE Hill (oncoming nurse) by BERE Flood (offgoing nurse). Report included the following information Nurse Handoff Report, Adult Overview, Intake/Output, MAR, Recent Results, Cardiac Rhythm SR w/ST elevation, Alarm Parameters, Neuro Assessment, and Event Log.

## 2024-09-03 NOTE — PROGRESS NOTES
Update Note    I had an extensive discussion with patient's children 3 children (daughters and 2 sons who are decision-makers for patient.),  We discussed the current medical situation, prognosis and to clarify the goals of care moving forward.    Clinical summary    This is a 74-year-old female with history of delirium/dementia with agitation, who was recently (8/11) found to have multiple mediastinal lymph node and paratracheal mass with high concern for malignancy.  On 8/23 patient underwent EBUS, and on 8/28 pathology revealed metastatic cancer favoring urothelial carcinoma.  Patient was admitted for shortness of breath, code STEMI was called but patient was found to have a pericardial effusion and underwent pericardiocentesis and a cardiac catheter was unremarkable.    Oncology was consulted and patient's case was discussed in detail with family.  Per oncology she is not a candidate for systemic cancer directed therapy, and will unlikely to recover to be a candidate for the therapy.  Her respiratory status, pericardial effusion which is suspected to be malignant further worsens her prognosis.  A CT scan of abdomen and pelvis was reviewed and discussed with family revealing retroperitoneal lymphadenopathy.     In summary-the patient has a diagnosis of metastatic urothelial carcinoma and significant declining functional status.    Discussion;    I explained the current medical condition including the limited treatment options available at this stage and the expected course of the disease without further curative interventions.  We reviewed the risk of continuing aggressive treatment versus focusing on comfort and quality of life.  I emphasized that the primary goal was to align the treatment plan with the patient's values, preferences, and goals.    Patient/family concerns and values    The family expressed concerns about increasing discomfort.  Per family the patient had expressed a preference for comfort and

## 2024-09-03 NOTE — PROGRESS NOTES
Cardiology Progress Note                                        Admit Date: 9/1/2024    Assessment/Plan:     Large pericardial effusion with tamponade; stable  Palliative care; I agree with this approach     Sandra Lerma is a 74 y.o. female with     PROBLEM LIST:  Patient Active Problem List    Diagnosis Date Noted    Pericardial effusion 09/01/2024    Pericardial effusion, acute 09/01/2024    COPD (chronic obstructive pulmonary disease) (HCC) 08/11/2024    Osteopenia of right upper arm 10/30/2023    Vitamin D deficiency 10/21/2022    S/P colonoscopic polypectomy 10/21/2019    Former smoker 07/30/2019    BMI 34.0-34.9,adult 01/29/2018    Anxiety and depression 05/15/2017    Cigarette nicotine dependence without complication 04/25/2017    Essential hypertension with goal blood pressure less than 140/90 04/20/2016    Hypertriglyceridemia without hypercholesterolemia 04/20/2016    Primary osteoarthritis of right knee 04/20/2016         Subjective:     Sandra Lerma reports none.    /60   Pulse (!) 103   Temp 99.2 °F (37.3 °C) (Oral)   Resp 28   Ht 1.575 m (5' 2\")   Wt 67.1 kg (147 lb 14.9 oz)   SpO2 91%   BMI 27.06 kg/m²     Intake/Output Summary (Last 24 hours) at 9/3/2024 1227  Last data filed at 9/3/2024 1200  Gross per 24 hour   Intake 59.66 ml   Output 1005 ml   Net -945.34 ml       Objective:      Physical Exam:  HEENT: Perrla, EOMI  Neck: No JVD,  No thyroidmegaly  Resp: CTA bilaterally; No wheezes or rales  CV: RRR s1s2 No murmur no s3  Abd:Soft, Nontender  Ext: No edema  Neuro: Alert and oriented; Nonfocal  Skin: Warm, Dry, Intact  Pulses: 2+ DP/PT/Rad      Telemetry: normal sinus rhythm    Current Facility-Administered Medications   Medication Dose Route Frequency    cefTRIAXone (ROCEPHIN) 1,000 mg in sterile water 10 mL IV syringe  1,000 mg IntraVENous Q24H    ALPRAZolam (XANAX) tablet 0.5 mg  0.5 mg Oral BID PRN    colchicine (COLCRYS) tablet 0.6 mg  0.6 mg Oral Daily    aspirin EC tablet  Magnesium 1.9 1.6 - 2.4 mg/dL   Arterial Blood Gas, POC    Collection Time: 09/03/24  5:25 AM   Result Value Ref Range    FIO2 40 %    POC pH 7.49 (H) 7.35 - 7.45      POC pCO2 50.1 (H) 35.0 - 45.0 MMHG    POC PO2 71 (L) 80 - 100 MMHG    POC HCO3 37.7 (H) 22 - 26 MMOL/L    POC O2 SAT 94.8 92 - 97 %    Base Excess 12.5 mmol/L    Site RIGHT RADIAL      DEVICE High Flow Nasal Cannula      POC TIDAL VOLUME 40 ml    POC Salvador's Test Positive      Specimen type: ARTERIAL

## 2024-09-03 NOTE — PROGRESS NOTES
0730 Bedside and Verbal shift change report given to Yamil BLACKBURN/ Lavern BLACKBURN (oncoming nurse) by Remigio BLACKBURN (offgoing nurse). Report included the following information Nurse Handoff Report, Adult Overview, Intake/Output, MAR, Recent Results, and Cardiac Rhythm NSR w/ ST elevation .     1140 VCI speaking with family privately regarding pt status s/p biopsy; orders received for CT of the abd    1230 Dr. Garzon cardiology at bedside to remove pt pericardial drain     1345 Pt transported to CT with RN x2    1415 Pt transported baclk to CCU 22 with RN x2    1600 Yamil BLACKBURN signing off for the day, pt care handed over to Lavern BLACKBURN

## 2024-09-04 NOTE — CARE COORDINATION
09/04/24 0855   Readmission Assessment   Number of Days since last admission? 8-30 days   Previous Disposition Home with Family   Who is being Interviewed Caregiver   What was the patient's/caregiver's perception as to why they think they needed to return back to the hospital? Other (Comment)  (Medical condition became worse.)   Did you visit your Primary Care Physician after you left the hospital, before you returned this time? No   Why weren't you able to visit your PCP? Did not have an appointment   Did you see a specialist, such as Cardiac, Pulmonary, Orthopedic Physician, etc. after you left the hospital? Yes   Who advised the patient to return to the hospital? Caregiver   Does the patient report anything that got in the way of taking their medications? Yes   In our efforts to provide the best possible care to you and others like you, can you think of anything that we could have done to help you after you left the hospital the first time, so that you might not have needed to return so soon? Other (Comment)  (Medical condition became worse.)

## 2024-09-04 NOTE — PROGRESS NOTES
Cardiology Progress Note                                        Admit Date: 9/1/2024    Assessment/Plan:     STEMI; s/p stent in LAD  PAF; new rhythm; will start PO Amiodarone  Palliative care; family would like hospice        Sandra Lerma is a 74 y.o. female with     PROBLEM LIST:  Patient Active Problem List    Diagnosis Date Noted    Pericardial effusion 09/01/2024    Pericardial effusion, acute 09/01/2024    COPD (chronic obstructive pulmonary disease) (HCC) 08/11/2024    Osteopenia of right upper arm 10/30/2023    Vitamin D deficiency 10/21/2022    S/P colonoscopic polypectomy 10/21/2019    Former smoker 07/30/2019    BMI 34.0-34.9,adult 01/29/2018    Anxiety and depression 05/15/2017    Cigarette nicotine dependence without complication 04/25/2017    Essential hypertension with goal blood pressure less than 140/90 04/20/2016    Hypertriglyceridemia without hypercholesterolemia 04/20/2016    Primary osteoarthritis of right knee 04/20/2016         Subjective:     Sandra Lerma reports none.    BP (!) 150/77   Pulse (!) 107   Temp 99.1 °F (37.3 °C) (Axillary)   Resp 19   Ht 1.575 m (5' 2\")   Wt 67.2 kg (148 lb 3.2 oz)   SpO2 94%   BMI 27.11 kg/m²     Intake/Output Summary (Last 24 hours) at 9/4/2024 0754  Last data filed at 9/4/2024 0600  Gross per 24 hour   Intake --   Output 300 ml   Net -300 ml       Objective:      Physical Exam:  HEENT: Perrla, EOMI  Neck: No JVD,  No thyroidmegaly  Resp: some rales  CV: irregular s1s2 No murmur no s3  Abd:Soft, Nontender  Ext: No edema  Neuro: Alert and oriented; Nonfocal  Skin: Warm, Dry, Intact  Pulses: 2+ DP/PT/Rad      Telemetry: AFIB    Current Facility-Administered Medications   Medication Dose Route Frequency    amiodarone (CORDARONE) tablet 200 mg  200 mg Oral BID    ALPRAZolam (XANAX) tablet 0.5 mg  0.5 mg Oral BID PRN    cefTRIAXone (ROCEPHIN) 2,000 mg in sterile water 20 mL IV syringe  2,000 mg IntraVENous Q24H    HYDROmorphone (DILAUDID) injection 0.5

## 2024-09-04 NOTE — PROGRESS NOTES
0730 Bedside and Verbal shift change report given to BERE Puckett (oncoming nurse) by BERE Flood (offgoing nurse). Report included the following information Nurse Handoff Report, Intake/Output, MAR, Recent Results, and Cardiac Rhythm ST/afib .   1400 Hospice at bedside - see note  1600 Family meeting w/ hospice RN, Dr. Sequeira & myself - plan to wean HHFNC over PM & transition pt to comfort care meds  1930 Bedside and Verbal shift change report given to BERE Flood (oncoming nurse) by BERE Pucektt (offgoing nurse). Report included the following information Nurse Handoff Report, Intake/Output, MAR, Recent Results, and Cardiac Rhythm afib .

## 2024-09-04 NOTE — PROGRESS NOTES
[facility]  Good Help to Those in Need  [phone]     Patient Name: Sandra Lerma  YOB: 1950  Age: 74 y.o.    [facility] RN Note:  Hospice consult received, reviewing chart. Will follow up with Unit Nurse and Care Manager to discuss plan of care, patient status and discharge disposition within the hour.   Plan is to meet bedside with son at 10 am.    Thank you for the opportunity to be of service to this patient.     Emi Flores RN  Clinical Nurse Liaison  [facility]   [phone] Mobile  [phone] Office   Available on Perfect Serve

## 2024-09-04 NOTE — PROGRESS NOTES
1930 - Bedside shift change report given to BERE Flood (oncoming nurse) by BERE Puckett (offgoing nurse). Report included the following information Nurse Handoff Report, Adult Overview, Intake/Output, MAR, Recent Results, Cardiac Rhythm ST w/ST elevation, Alarm Parameters, Neuro Assessment, and Event Log. Family at bedside visiting with patient.    2340 - PRN Dilaudid given.    0409 - PRN Dilaudid given.    0630 - PRN Xanax given.    0715 - Patient becoming agitated at her grandson, now requesting to be left alone so she can rest, reassurance provided, patient still in distress, HR elevated at this time and patient in a-fib, BP stable.    0730 - Bedside shift change report given to BERE Puckett (oncoming nurse) by BERE Flood (offgoing nurse). Report included the following information Nurse Handoff Report, Adult Overview, Intake/Output, MAR, Cardiac Rhythm A-fib, Alarm Parameters, Neuro Assessment, and Event Log.

## 2024-09-04 NOTE — PROGRESS NOTES
NUTRITION     Nutrition screening referral was triggered based on results obtained during nursing admission assessment for wt loss and decreased appetite.  The patient's chart was reviewed and nutrition assessment is not indicated at this time since pt transitioning to comfort care. Thank you.     Karina Madrid RD [facility]

## 2024-09-04 NOTE — PROGRESS NOTES
[facility]  [facility]  [phone]    Patient Name: Sandra Lerma  YOB: 1950  Age: 74 y.o.    [facility] RN Note:  Hospice consult noted. Chart reviewed. Plan of care discussed with patients nurse & care manager.   In to meet with son.   Discussed Hospice philosophy, general plan of care, levels of care, services and on call procedures.  Patient is alert and on HF oxygen. Dr Sequeira is managing HF wean with comfort meds in place. Per son if she tolerates wean home is not an option as they do not have 24hr care.  He is calling his siblings to see if they can come to the hospital this afternoon.  I will stop by when they arrive to meet with all 3 children and see how patient is tolerating HF wean.      Thank you for the opportunity to be of service to this patient.     Emi Flores RN  Clinical Nurse Liaison  [facility]   [phone] Mobile  [phone] Office   Available on [employer]

## 2024-09-04 NOTE — CARE COORDINATION
Care Management Initial Assessment       RUR:  18% Moderate Risk  Readmission? Yes - 9/1/21024  1st IM letter given? No  1st  letter given: No       09/04/24 0857   Service Assessment   Patient Orientation Person   Cognition Short Term Memory Deficit   History Provided By Child/Family   Primary Caregiver Family   Support Systems Children;Family Members;Friends/Neighbors   Patient's Healthcare Decision Maker is: Legal Next of Kin   PCP Verified by CM Yes   Last Visit to PCP Within last 6 months   Prior Functional Level Assistance with the following:;Bathing;Dressing;Toileting;Cooking;Housework;Shopping;Mobility   Current Functional Level Assistance with the following:;Bathing;Dressing;Toileting;Cooking;Housework;Mobility   Can patient return to prior living arrangement Unknown at present   Ability to make needs known: Poor   Family able to assist with home care needs: No   Would you like for me to discuss the discharge plan with any other family members/significant others, and if so, who? Yes   Financial Resources Medicare   Social/Functional History   Lives With Family   Type of Home Apartment   Home Layout One level   Home Access Stairs to enter with rails   Entrance Stairs - Number of Steps 2   Entrance Stairs - Rails Right   Bathroom Shower/Tub Tub/Shower unit   Bathroom Toilet Standard   Bathroom Equipment Grab bars in shower   Bathroom Accessibility Wheelchair accessible   Home Equipment None   Receives Help From Family   ADL Assistance Needs assistance   Bath Maximum assistance   Dressing Maximum assistance   Grooming Maximum assistance   Feeding Independent   Toileting Needs assistance   Homemaking Responsibilities Yes   Meal Prep Responsibility Primary   Laundry Responsibility Primary   Cleaning Responsibility Primary   Bill Paying/Finance Responsibility Primary   Shopping Responsibility Primary   Health Care Management Primary   Ambulation Assistance Needs assistance   Transfer Assistance Needs  assistance   Active  No   Patient's  Info Granddaughter, friend, daughter   Mode of Transportation Car   Education 10th Grade   Occupation Retired   Discharge Planning   Type of Residence Apartment   Living Arrangements Children   Current Services Prior To Admission Oxygen Therapy  ([employer])   Potential Assistance Needed N/A   DME Ordered? Oxygen therapy (comment)   Potential Assistance Purchasing Medications No   Patient expects to be discharged to: Apartment   One/Two Story Residence One story   History of falls? 0   Services At/After Discharge   Transition of Care Consult (CM Consult) Hospice   Internal Home Health No   [place] Resource Information Provided? No   Mode of Transport at Discharge BLS   Condition of Participation: Discharge Planning   The Patient and/or Patient Representative was provided with a Choice of Provider? Patient Representative   The Patient and/Or Patient Representative agree with the Discharge Plan? Yes   Freedom of Choice list was provided with basic dialogue that supports the patient's individualized plan of care/goals, treatment preferences, and shares the quality data associated with the providers?  Yes     Mrs. Mary Buck was called.  Mrs. Lerma was not able to participate in the assessment.  Mrs. Buck verified Mrs. Lerma's address, phone number, and insurance.  Mrs. Buck was living in a first floor apartment with 2 steps to enter.  She uses home oxygen through [employer].  Her family had moved in with her to assist her after her last discharge.  She was dependent for her ADLs and IADLs.  She does not drive.  She is retired.  Her pharmacy [facility].      Mrs. Buck was offered choice for hospice.  She agreed with a referral to [facility].      A referral was sent to [facility] through [facility].      Will continue to follow for discharge planning.  KAREN WALKER LCSW

## 2024-09-04 NOTE — PROGRESS NOTES
Spiritual Health Assessment/Progress Note  [facility]    Spiritual/Emotional Needs, Family Care, Code STEMI, Anticipatory Grief, Life Adjustments, End of Life,      Name: Sandra Lerma MRN: [id number]    Age: 74 y.o.     Sex: female   Language: English   Jainism: Jain   Pericardial effusion     Date: 9/4/2024            Total Time Calculated: 34 min              Spiritual Assessment began in [facility] 4 CORONARY CARE        Referral/Consult From: Nurse   Encounter Overview/Reason: Spiritual/Emotional Needs, Family Care  Service Provided For: Family    [place], Belief, Meaning:   Patient unable to communicate at this time  Family/Friends identify as spiritual      Importance and Influence:  Patient unable to communicate at this time  Family/Friends have spiritual/personal beliefs that influence decisions regarding the patient's health    Community:  Patient feels well-supported. Support system includes: Children and Extended family  Family/Friends feel well-supported. Support system includes: Extended family    Assessment and Plan of Care:   communicate with Dillon, pt son. Dillon talked about his mother talking about dying and understanding that she may be dying. Dillon shared that the patient does not want to know about her recent diagnosis of cancer, nor does she want to talk about it. Ms Flores is being transitioned to comfort measures.  Dillon desires to respect his mother's desires and allow her to have a peaceful transition.     Patient Interventions include: Other: unable to communicate.   Family/Friends Interventions include: Explored spiritual coping/struggle/distress and theological reflection, Affirmed coping skills/support systems, and Engaged in life review and/or legacy    Patient Plan of Care: Other: unable to communicate.  Family/Friends Plan of Care: Spiritual Care available upon further referral    Electronically signed by REV. TARYN MCCABE M.Div, BCC on 9/4/2024 at  11:12 AM

## 2024-09-04 NOTE — PROGRESS NOTES
[facility]  [address], [address] [facility] [address]  W: [phone]  F: [phone]    Reason for Evaluation:   Sandra Lerma is a 74 y.o. female with HTN and COPD who is seen in consultation at the request of Dr. Sequeira for evaluation of metastatic carcinoma.    Hematology/Oncology Treatment History:     PRIMARY DIAGNOSIS: Urothelial carcinoma  DATE OF DIAGNOSIS:  8/23/24  ORIGINAL STAGE: metastatic  DIAGNOSTICS:   4R lymph node biopsy: metastatic carcinoma, favor urothelial origin    SITES OF DISEASE: Mediastinal lymph nodes, staging pending   PRIOR TREATMENT: None  CURRENT TREATMENT: None    History of Present Illness:   Sandra Lerma is a pleasant 74 y.o. female who presents today for evaluation of metastatic carcinoma.    Patient was recently hospitalized from 8/11 - 8/16 for multifocal pneumonia and COPD exacerbation. She was treated with a course of antibiotics and prednisone taper an discharged on supplemental oxygen. During that admission, CTA chest on 8/11 showed multiple prominent enlarged mediastinal lymph nodes, including R paratracheal alan mass (4.9 cm, subcarinal 4.1 cm) as well as small right pleural effusion. Underwent EBUS with fine needle biopsy of mediastinal lymph node on 8/23/24. Path confirmed metastatic carcinoma, favor urothelial origin.     Re-presented to [facility]. with 3 day history of chest pain and shortness of breath. Initial EKG with concern for STEMI but subsequently found to have diffsue ST changes c/w pericarditis as well as large pericardial effusion. She is currently admitted to CCU. Underwent pericardiocentesis with Cardiology. Cytology pending.     Interval History:  Intensivist met with family yesterday. Decision made to transition to hospice.    Social History:  Current smoker. Previously lived alone and was capable of all ADLs; has had substantial decline since last 2 months.    Review of systems was obtained and pertinent findings reviewed  above. Past medical history, social history, family history, medications, and allergies are located in the electronic medical record.    Physical Exam:   BP (!) 142/92   Pulse (!) 117   Temp 98.9 °F (37.2 °C)   Resp 28   Ht 1.575 m (5' 2\")   Wt 67.2 kg (148 lb 3.2 oz)   SpO2 91%   BMI 27.11 kg/m²   ECOG PS: 4  General: chronically ill appearing, on HFNC, appears comfortable  Neck: supple  Respiratory: on HFNC, no tachypnea  CV: no peripheral edema  Skin: no rashes, no ecchymoses, no petechiae  Neuro: grossly intact, oriented    Results:     Lab Results   Component Value Date/Time    WBC 8.2 09/03/2024 04:53 AM    HGB 9.7 09/03/2024 04:53 AM    HCT 30.6 09/03/2024 04:53 AM     09/03/2024 04:53 AM    MCV 86.0 09/03/2024 04:53 AM     Lab Results   Component Value Date/Time     09/03/2024 04:53 AM    K 3.8 09/03/2024 04:53 AM    CL 98 09/03/2024 04:53 AM    CO2 38 09/03/2024 04:53 AM    BUN 11 09/03/2024 04:53 AM    GFRAA >60 10/12/2021 03:38 PM     Lab Results   Component Value Date/Time    ALT 26 09/03/2024 04:53 AM    GLOB 3.3 09/03/2024 04:53 AM     Pathology:  Specimen Source   1: Lymph node, 4R, fine needle aspiration and cell block:       CYTOLOGIC INTERPRETATION:   Lymph node, 4R, fine needle aspiration and cell block:     Metastatic carcinoma, favor urothelial carcinoma.   See comment.     General Categorization   Positive for malignancy.     Specimen Adequacy   Satisfactory for evaluation.       Comment   Cytology preparations and cell block shows overtly malignant cells.  The   cytomorphology and staining pattern are most consistent with metastatic   carcinoma, favor urothelial carcinoma.     IMMUNOHISTOCHEMISTRY*   Diagnostic     Test                    Result   GATA3:               Positive   P40:                     Positive   P63:                    Positive   CK7:                    Positive                 TTF-1:                Negative   Napsin A:               Negative   CK20:

## 2024-09-04 NOTE — CONSULTS
Palliative    Discussed with ICU attending.  He is going to manage comfort measures, weaning of oxygen,, working with hospice team.   He will call us as needed [phone]

## 2024-09-04 NOTE — PROGRESS NOTES
Music Therapy Assessment  [facility]    Sandra Lerma [id number]     1950  74 y.o.  female    Patient Telephone Number: [phone] (home)   Zoroastrian Affiliation: Sikh   Language: English   Patient Active Problem List    Diagnosis Date Noted    Pericardial effusion 09/01/2024    Pericardial effusion, acute 09/01/2024    COPD (chronic obstructive pulmonary disease) (HCC) 08/11/2024    Osteopenia of right upper arm 10/30/2023    Vitamin D deficiency 10/21/2022    S/P colonoscopic polypectomy 10/21/2019    Former smoker 07/30/2019    BMI 34.0-34.9,adult 01/29/2018    Anxiety and depression 05/15/2017    Cigarette nicotine dependence without complication 04/25/2017    Essential hypertension with goal blood pressure less than 140/90 04/20/2016    Hypertriglyceridemia without hypercholesterolemia 04/20/2016    Primary osteoarthritis of right knee 04/20/2016        Date: 9/4/2024            Total Time Calculated: 25 min          [facility] CORONARY CARE    Mental Status:   [  ] Alert [  ] Forgetful [  ]  Confused  [  ] Minimally responsive  [x] Sleeping    Communication Status: [  ] Impaired Speech [  ] Nonverbal -N/A    Physical Status:   [x] Oxygen in use  [  ] Hard of Hearing [  ] Vision Impaired  [  ] Ambulatory  [  ] Ambulatory with assistance [  ] Non-ambulatory     Music Preferences, Background: Pt enjoys R&B; Artists like Frederick Wolf     Clinical Problem to be addressed: Comfort to pt/family; Relaxation    Goal(s) met in session: N/A: Please see Session Observations below.   Physical/Pain management (Scale of 1-10):    Pre-session rating: N/A  Post-session rating: N/A  [  ] Increased relaxation   [  ] Affected breathing patterns  [  ] Decreased muscle tension   [  ] Decreased agitation  [  ] Affected heart rate    [  ] Increased alertness     Emotional/Psychological:  [  ] Increased self-expression   [  ] Decreased aggressive behavior   [  ] Decreased feelings of stress  [  ] Discussed healthy coping  skills     [  ] Improved mood    [  ] Decreased withdrawn behavior     Social:  [  ] Decreased feelings of isolation/loneliness [  ] Positive social interaction   [x] Provided support and/or comfort for family/friends    Spiritual:  [  ] Spiritual support    [  ] Expressed peace  [  ] Expressed marsha    [  ] Discussed beliefs    Techniques Utilized (Check all that apply): N/A: Please see Session Observations below.   [  ] Procedural support MT [  ] Music for relaxation [  ] Patient preferred music  [  ] Nora analysis  [  ] Song choice  [  ] Music for validation  [  ] Entrainment  [  ] Movement to music [  ] Guided visualization  [  ] ISO Principle  [  ] Patient instrument playing [  ] Song writing  [  ] Sing along   [  ] Improvisation  [  ] Sensory stimulation  [  ] Active Listening  [  ] Music for spiritual support [  ] Making of CDs as gifts    Session Observations:  Referred by Shanti Hodgson LCSW & Chaplain Paulo; This music therapist (MT) was actively approaching the Critical Care Unit when a staff  stopped this MT in the hallway. Staff  was offering support to the pt's son and grandson at this time, and asked to introduce this MT to see if family may be receptive to music therapy for the pt. MT offered insight on the referral placed by the Palliative , as well as offered words of support as the pt's son greeted this MT and appeared tearful. Staff  offered additional insight on the pt's current state, on behalf of the pt's son, and asked what the pt's music preferences were. Pt's son shared these and expressed an openness to a visit. He then requested to be present while music therapy was offered. MT welcomed this and walked towards the unit alongside family and the staff . Upon arrival, pt was lying in bed, sleeping soundly. Due to this, pt's son expressed wanting to let the pt sleep for now. He expressed gratitude for the offered support and declined

## 2024-09-04 NOTE — PROGRESS NOTES
Referral source:  notified of Sandra Lerma family members in need of spiritual / grief support on [facility] 4 CORONARY CARE.     Assessment: Family Care    Provided spiritual health support to son Dillon and grandson who are struggling as Ms Flores is transitioning to comfort. Provided ministry of presence, empathic listening and cultivated relationships of care and support.     nestor Layton MDiv, [facility]   paging Service [phone] ([phone])

## 2024-09-04 NOTE — PROGRESS NOTES
[facility]     ICU TEAM Progress Note           Name: Sandra Lerma   : 1950   MRN: [id number]   Date: 2024          CU PROBLEM LIST   -Pericardiocentesis  -Lung lesions.     HISTORY OF PRESENT ILLNESS:     73 y/o female past medical history of delirium/dementia-with agitation, (CT scan on -with multiple enlarged mediastinal lymph node, paratracheal mass, is highly suspicious for malignancy-patient underwent EBUS biopsy with a result on  reading metastatic carcinoma, favor urothelial carcinoma. - GATA3: P40: P63: CK7 Positive -family aware pending discussion of biopsy results and follow-up with oncology)                         Patient presented to the ED with SOB and chest pain. Initially EKG changes suggestive of STEMI but on further exam pt discovered to have pericardial effusion and EKG findings consistent with pericarditis. She was taken by cardiology for pericardiocentesis and then transferred to the CCU for observation and further management.     SUBJECTIVE:     -overnight patient was reported to have had moderate agitation, requiring multiple medications for sedation and anxiolytics.  Per family this is consistent with what patient had done at home.  This a.m. noted to be agitated, restarted on benzodiazepines.  9/3-overnight reports of agitation requiring pain medication, continues to require oxygen per high flow.  -this a.m. intermittent episodes of altered mentation, increasing tachycardia, changes on rhythm strip, increasing oxygen requirement.    Discussion with hospice team, family, nurse and myself.  Per family they would like to proceed with comfort measures only.    They would like to begin weaning high flow tonight.      POD:  3 Days Post-Op    S/P:   Procedure(s):  Pericardiocentesis    Active Problem List:     [unfilled]    Past Medical History:      has a past medical history of Anxiety, Arthritis, COPD (chronic obstructive pulmonary  hours.    Invalid input(s): \"PTP\"   Invalid input(s): \"PHI\", \"PCO2I\", \"PO2I\", \"FIO2I\"  No results for input(s): \"CPK\", \"CKMB\" in the last 72 hours.    Invalid input(s): \"TROIQ\", \"BNPP\"    Ventilator Settings:  Mode Rate Tidal Volume Pressure FiO2 PEEP                    Peak airway pressure:      Minute ventilation:          MEDS: Reviewed    Chest X-Ray:  Reviewed      CRITICAL CARE CONSULTANT NOTE  I had a face to face encounter with the patient, reviewed and interpreted patient data including clinical events, labs, images, vital signs, I/O's, and examined patient.  I have discussed the case and the plan and management of the patient's care with the consulting services, the bedside nurses and the respiratory therapist.      NOTE OF PERSONAL INVOLVEMENT IN CARE   This patient has a high probability of imminent, clinically significant deterioration, which requires the highest level of preparedness to intervene urgently. I participated in the decision-making and personally managed or directed the management of the following life and organ supporting interventions that required my frequent assessment to treat or prevent imminent deterioration.    I personally spent 35 minutes of critical care time.  This is time spent at this critically ill patient's bedside actively involved in patient care as well as the coordination of care.  This does not include any procedural time which has been billed separately.    James Sequeira   Staff Intensivist/Infectious Disease  [facility]  9/4/2024

## 2024-09-05 PROBLEM — J96.00 ACUTE RESPIRATORY FAILURE (HCC): Status: ACTIVE | Noted: 2024-01-01

## 2024-09-05 PROBLEM — R45.1 RESTLESSNESS AND AGITATION: Status: ACTIVE | Noted: 2024-01-01

## 2024-09-05 PROBLEM — R06.03 RESPIRATORY DISTRESS: Status: ACTIVE | Noted: 2024-01-01

## 2024-09-05 PROBLEM — Z51.5 HOSPICE CARE: Status: ACTIVE | Noted: 2024-01-01

## 2024-09-05 PROBLEM — J39.8 INCREASED TRACHEAL SECRETIONS: Status: ACTIVE | Noted: 2024-01-01

## 2024-09-05 NOTE — H&P
[facility]   Good Help to Those in Need  [phone]     Patient Name:  Sandra Lerma  YOB: 1950         Date of Provider Hospice Visit: 09/05/24     Level of Care:   [x] General Inpatient (GIP)              [] Routine                   [] Respite     Current Location of Care:  [x] [facility]           [] [facility]         [] [facility]        [] [facility]           [] [facility] ([facility])     IF [facility], patient referred from:  [] [facility]           [] [facility]         [] [facility]        [] [facility]           [] Home         [] Other:      Date of Original Hospice Admission:  9/5/2024  1:27 PM   Hospice Medical Director at time of admission: Dr. Ocampo     Principle Hospice Diagnosis:   Acute respiratory failure (HCC) [J96.00]   Diagnoses RELATED to the terminal prognosis: metastatic cancer of unknown primary-favor urothelial  Other Diagnoses:      HOSPICE SUMMARY   Sandra Lerma    The patient's principle diagnosis has resulted in     Patient admitted to the hospital with agitation, restlessness and had imaging showing what appeared to be widely metastatic cancer.  High suspicion for urothelial cancer.  Unfortunately she also has had increasing shortness of breath, chest pain.  She was seen by cardiology for pericardiocentesis, transferred to the CCU but continued to show evidence of decline with increasing agitation, respiratory distress, nonverbal signs of pain.  Was seen by multiple subspecialty providers and not a candidate for any type of cancer directed therapy.  She had received multiple IV doses of medication prior to transition to comfort focused care and family agreed on hospice.  Patient mated under GIP level care     Functionally, the patient's Karnofsky and/or Palliative Performance Scale has declined over a period of weeks and is estimated at 10 The patient is dependent on the following ADLs:all     Objective information that support this patients limited prognosis includes: See note above.       The patient/family

## 2024-09-05 NOTE — PROGRESS NOTES
[facility]  [facility] in [facility]  [phone]    Inpatient Nursing Admission   Patient Name: Sandra Lerma  YOB: 1950  Age: 74 y.o.       Date of Hospice Admission: 9/5/2024  Hospice Attending Elected by Patient: Lamine Monroy MD  Primary Care Physician: Valeria Min APRN - NP  Admitting RN: Nina Flores RN  :      Level of Care (GIP/Routine/Respite): [facility]  Facility of Care: [facility]  Patient Room: [facility]/     HOSPICE SUMMARY     Hospice Diagnosis: Acute respiratory failure (HCC) [J96.00]      Co-Morbidities:   Patient Active Problem List   Diagnosis    Anxiety and depression    Former smoker    S/P colonoscopic polypectomy    BMI 34.0-34.9,adult    Cigarette nicotine dependence without complication    Essential hypertension with goal blood pressure less than 140/90    Hypertriglyceridemia without hypercholesterolemia    Primary osteoarthritis of right knee    Vitamin D deficiency    Osteopenia of right upper arm    COPD (chronic obstructive pulmonary disease) (HCC)    Pericardial effusion    Pericardial effusion, acute    Acute respiratory failure (HCC)         Rationale for a prognosis of life expectancy of 6 months or less if the disease follows its normal course (Disease Specific History):     Sandra Lerma is a 74 y.o. who was admitted to [facility]. The patient's principle diagnosis of acute respiratory failure has resulted in the pursuit of hospice.  Functionally, the patient's Palliative Performance Scale has declined over a period of days and is estimated at 10. Objective information that support this patients limited prognosis includes: need for IV meds for heavy symptom burden.  The patient/family chose comfort measures with the support of Hospice.        Prognosis estimated based on 09/05/24 clinical assessment is:   [] Few to Many Hours  [x] Hours to Days   [] Few to Many Days   [] Days to Weeks   [] Few to Many Weeks   [] Weeks  to Months   [] Few to Many Months    ASSESSMENT    Patient self-reports:  []  Yes    [x] No    SYMPTOMS: pain, SOB, secretions    SIGNS/PHYSICAL FINDINGS: labored breathing, secretions, agitated, fidgeting    KARNOFSKY: 10          CLINICAL INFORMATION     Wt Readings from Last 3 Encounters:   09/05/24 67 kg (147 lb 11.3 oz)   08/23/24 66.6 kg (146 lb 12.8 oz)   08/11/24 66.6 kg (146 lb 13.2 oz)     Ht Readings from Last 3 Encounters:   09/01/24 1.575 m (5' 2\")   08/23/24 1.575 m (5' 2\")   08/11/24 1.575 m (5' 2\")     There is no height or weight on file to calculate BMI.    There were no vitals filed for this visit.    LAB VALUES  [unfilled]  [unfilled]  No results found for: \"TP\"    Currently this patient has:  [] Supplemental O2 [x] Peripheral IV  [] PICC    [] PORT   [] Puente Catheter [] NG Tube   [] PEG Tube [] Ostomy    [] AICD: Has ICD been deactivated?  [] Yes [] No:______    PLAN        1. GIP admission for management of pain, dyspnea and anxiety  2. Schedule IV dilaudid 2mg every 4 hours and every 15 min PRN pain, air hunger.  3. Schedule IV ativan 1mg every 4 hours  4.. PRN comfort set for breakthrough symptoms, 1mg dilaudid and 2mg versed every 15 minutes as needed  5. SW and  available for ongoing support  6. Maintain peripheral IV per hospital protocol  7. Place/maintain puente for urinary retention and maintain per hospital protocols.  8. Provide emotional support to family at bedside  9. Frequent rounding and communication with bedside nurse and hospice team to ensure comfort and titrate medications as needed    Hospice Team Frequency Orders:  Skilled Nurse -   Daily x 7 days /every other day x 7 days  with 5 PRN visits for symptom control. MSW - 1 visit for initial assessment/evaluation for family support and need for volunteer services..  - 1 visit for initial assessment/evaluation for spiritual support.     ADVANCE CARE PLANNING (Complete in ACP Flow Sheet)   Code Status:

## 2024-09-05 NOTE — DEATH NOTES
Death Pronouncement Note  Patient's Name: Sandra Lerma   Patient's YOB: 1950  MRN Number: [id number]    Admitting Provider: Lamine Monroy MD  Attending Provider: Lamine Monroy MD    Patient was examined and the following were absent: Pulses, Blood Pressure, and Respiratory effort    I declared the patient dead on 9/5/2024 at 7:25 PM    Preliminary Cause of Death: Acute hypoxic respiratory failure (HCC)     Electronically signed by JIAN Lainez NP on 9/5/24 at 7:24 PM EDT

## 2024-09-05 NOTE — PROGRESS NOTES
Beside report received by Lavern BLACKBURN.   Family at bedside and plan over night was discussed. At 2100 the family decided to begin weaning off of HFNC. Comfort care meds are being continued. Safety and comfort measures in place.

## 2024-09-05 NOTE — PROGRESS NOTES
Spiritual Health Assessment/Progress Note  [facility]    Follow-up, Family Care, End of Life,      Name: Sandra Lerma MRN: [id number]    Age: 74 y.o.     Sex: female   Language: English   Mandaen: Nondenominational   Acute respiratory failure (HCC)     Date: 9/5/2024            Total Time Calculated: 26 min              Spiritual Assessment began in [facility] 6E MED ONCOLOGY        Referral/Consult From: Nurse (on call page)   Encounter Overview/Reason: Follow-up  Service Provided For: Family    Lavern, Belief, Meaning:   Patient unable to communicate at this time  Family/Friends have beliefs or practices that help with coping during difficult times      Importance and Influence:  Patient unable to communicate at this time  Family/Friends have spiritual/personal beliefs that influence decisions regarding the patient's health    Community:  Patient Other: patient unable to communicate at this time  Family/Friends Other: unable to assess    Assessment and Plan of Care:     Patient Interventions include: Other: unable to communicate at this time  Family/Friends Interventions include: Affirmed coping skills/support systems and Provided sacramental/Yazidi ritual    Patient Plan of Care: Spiritual Care available upon further referral  Family/Friends Plan of Care: Spiritual Care available upon further referral    Electronically signed by Edouard Mason Chaplain Resident on 9/5/2024 at 6:47 PM

## 2024-09-05 NOTE — PROGRESS NOTES
Spiritual Health Assessment/Progress Note  [facility]    Family Care, Follow-up, Family Care, Grief and loss, Anticipatory Grief, Life Adjustments, Adjustment to illness,      Name: Sandra Lerma MRN: [id number]    Age: 74 y.o.     Sex: female   Language: English   Mormon: Sikhism   Pericardial effusion     Date: 9/5/2024            Total Time Calculated: 20 min              Spiritual Assessment began in [facility] 4 CORONARY CARE        Referral/Consult From: Rounding   Encounter Overview/Reason: Family Care, Follow-up  Service Provided For: Family    Lavern, Belief, Meaning:   Patient unable to communicate at this time  Family/Friends are connected with a lavern tradition or spiritual practice      Importance and Influence:  Patient unable to communicate at this time  Family/Friends have no beliefs influential to healthcare decision-making identified during this visit    Community:  Patient Other: unable to assess.  Family/Friends feel well-supported. Support system includes: Children    Assessment and Plan of Care:     Patient Interventions include: Other: unable to assess.  Family/Friends Interventions include: Other: Grieve care    Patient Plan of Care: Other: unable to assess.  Family/Friends Plan of Care: Spiritual Care available upon further referral    Electronically signed by REV. TARYN MCCABE M.Div,  BANDAR on 9/5/2024 at 12:18 PM

## 2024-09-06 NOTE — PROGRESS NOTES
Family now departed. Post mortem care complete. Pt awaiting security to be transported to [facility]. Nursing home updated.

## 2024-09-06 NOTE — DISCHARGE SUMMARY
Hospice Discharge Summary    [facility]  [facility] in [facility]        Date of Admission: 9/5/2024  Date of Discharge: 9/5/2024    Sandra Lerma is a 74 y.o. year old who was admitted to [facility] at [facility] with a Hospice diagnosis of Acute respiratory failure (HCC) [J96.00].      The patient's care was focused on comfort and the patient passed away on 9/5/2024.

## 2024-09-08 NOTE — DISCHARGE SUMMARY
Discharge Summary     Patient: Sandra Lerma       MRN: [id number]       YOB: 1950       Age: 74 y.o.     Date of admission:  9/1/2024    Date of discharge:  9/8/2024    Primary care provider:  Valeria Min APRN - NP     Admitting provider:  Benito Benjamin MD    Discharging provider(s): James Sequeira MD - Staff Intensivist - [facility]     Consultations  IP CONSULT TO ONCOLOGY  IP CONSULT TO HOSPICE    Discharge Condition: poor.  Discharge Destination: to [facility] hospice.    Admission diagnosis  Pericardial effusion [I31.39]  ST elevation myocardial infarction (STEMI), unspecified artery (HCC) [I21.3]  Pericardial effusion, acute [I30.9]    Please refer to the admission history and physical for details on the presenting problem.     Final discharge diagnoses and brief hospital course    This is a 74-year-old female with history of delirium/dementia with agitation, who was recently (8/11) found to have multiple mediastinal lymph node and paratracheal mass with high concern for malignancy.  On 8/23 patient underwent EBUS, and on 8/28 pathology revealed metastatic cancer favoring urothelial carcinoma.  Patient was admitted for shortness of breath, code STEMI was called but patient was found to have a pericardial effusion and underwent pericardiocentesis and a cardiac catheter was unremarkable.     Oncology was consulted and patient's case was discussed in detail with family.  Per oncology she is not a candidate for systemic cancer directed therapy, and will unlikely to recover to be a candidate for the therapy.  Her respiratory status, pericardial effusion which is suspected to be malignant further worsens her prognosis.  A CT scan of abdomen and pelvis was reviewed and discussed with family revealing retroperitoneal lymphadenopathy.      In summary-the patient has a diagnosis of metastatic urothelial carcinoma and significant declining functional status.     Discussion;     I  Patient ambulatory to the restroom with steady gait to provide urine sample. Report given to Nomi Knox RN. no

## 2024-10-18 LAB
ACID FAST STN SPEC: NEGATIVE
MYCOBACTERIUM SPEC QL CULT: NEGATIVE
SPECIMEN PREPARATION: NORMAL
SPECIMEN SOURCE: NORMAL

## (undated) DEVICE — SINGLE USE SUCTION VALVE MAJ-209: Brand: SINGLE USE SUCTION VALVE (STERILE)

## (undated) DEVICE — 3M™ CUROS™ DISINFECTING CAP FOR NEEDLELESS CONNECTORS 270/CARTON 20 CARTONS/CASE CFF1-270: Brand: CUROS™

## (undated) DEVICE — CATH IV AUTOGRD BC BLU 22GA 25 -- INSYTE

## (undated) DEVICE — 1200 GUARD II KIT W/5MM TUBE W/O VAC TUBE: Brand: GUARDIAN

## (undated) DEVICE — SYRINGE 50ML E/T

## (undated) DEVICE — Device

## (undated) DEVICE — NEEDLE ASPIR 21GA L700MM US GUID TREAT DST END FOR EFFICIENT

## (undated) DEVICE — KIT PERICARDCENT DIA8.3FR STRAIGHT CATH + CLP LIDO TY FLD ASPIR

## (undated) DEVICE — BOWL: 32OZ PEELPOUCH 50/CS: Brand: MEDEGEN MEDICAL PRODUCTS, LLC

## (undated) DEVICE — SENSOR PLSE OXMTR NEO AD 40KG ADH DISP NELLCOR

## (undated) DEVICE — PINNACLE INTRODUCER SHEATH: Brand: PINNACLE

## (undated) DEVICE — SPECIAL PROCEDURE DRAPE 32" X 34": Brand: SPECIAL PROCEDURE DRAPE

## (undated) DEVICE — CONTAINER SPEC 20 ML LID NEUT BUFF FORMALIN 10 % POLYPR STS

## (undated) DEVICE — SET ADMIN 16ML TBNG L100IN 2 Y INJ SITE IV PIGGY BK DISP

## (undated) DEVICE — SYRINGE MEDICAL 3ML CLEAR PLASTIC STANDARD NON CONTROL LUERLOCK TIP DISPOSABLE

## (undated) DEVICE — CATHETER IV 20GA L1IN FEP STR HUB INTROCAN SFTY

## (undated) DEVICE — CATHETER PH SUCT 14FR

## (undated) DEVICE — SOLUTION IRRIG 500ML 0.9% SOD CHLO USP POUR PLAS BTL

## (undated) DEVICE — BASIN EMSIS 16OZ GRAPHITE PLAS KID SHP MOLD GRAD FOR ORAL

## (undated) DEVICE — KIT MED IMAG CNTRST AGNT W/ IOPAMIDOL REUSE

## (undated) DEVICE — TOWEL 4 PLY TISS 19X30 SUE WHT

## (undated) DEVICE — NON-REM POLYHESIVE PATIENT RETURN ELECTRODE: Brand: VALLEYLAB

## (undated) DEVICE — KIT MFLD ISOLATN NACL CNTRST PRT TBNG SPIK W/ PRSS TRNSDUC

## (undated) DEVICE — PACK PROCEDURE SURG HRT CATH

## (undated) DEVICE — SNARE ENDOSCP M L240CM W27MM SHTH DIA2.4MM CHN 2.8MM OVL

## (undated) DEVICE — SYR 3ML LL TIP 1/10ML GRAD --

## (undated) DEVICE — SYRINGE MED 30ML STD CLR PLAS LUERLOCK TIP N CTRL DISP

## (undated) DEVICE — SOLIDIFIER FLD 3.2OZ 3000CC TRAD IN BTL LIQUI-LOC

## (undated) DEVICE — ANGIOGRAPHY KIT

## (undated) DEVICE — KIT AT-X65 ANGIOTOUCH HAND CONTROLLER

## (undated) DEVICE — SYR 10ML LUER LOK 1/5ML GRAD --

## (undated) DEVICE — Device: Brand: MEDICAL ACTION INDUSTRIES

## (undated) DEVICE — NEEDLE HYPO 18GA L1.5IN PNK S STL HUB POLYPR SHLD REG BVL

## (undated) DEVICE — ELECTRODE,RADIOTRANSLUCENT,FOAM,3PK: Brand: MEDLINE

## (undated) DEVICE — WASTE KIT - ST MARY: Brand: MEDLINE INDUSTRIES, INC.

## (undated) DEVICE — 3M™ TEGADERM™ CHG DRESSING 25/CARTON 4 CARTONS/CASE 1660: Brand: TEGADERM™

## (undated) DEVICE — PROVE COVER: Brand: UNBRANDED

## (undated) DEVICE — SINGLE USE BIOPSY VALVE MAJ-210: Brand: SINGLE USE BIOPSY VALVE (STERILE)

## (undated) DEVICE — SYRINGE MED 20ML STD CLR PLAS LUERSLIP TIP N CTRL DISP

## (undated) DEVICE — KIT HND CTRL 3 W STPCOCK ROT END 54IN PREM HI PRSS TBNG AT

## (undated) DEVICE — TRAP SUC MUCOUS 70ML -- MEDICHOICE MEDLINE

## (undated) DEVICE — SOLIDIFIER MEDC 1200ML -- CONVERT TO 356117

## (undated) DEVICE — SWABSTICK MEDICATED 1.75 CC SINGLE CHLORAPREP

## (undated) DEVICE — SINGLE USE ASPIRATION NEEDLE NA-U401SX-4025N: Brand: SINGLE USE ASPIRATION NEEDLE

## (undated) DEVICE — BITE BLK ENDOSCP AD 54FR GRN POLYETH ENDOSCP W STRP SLD

## (undated) DEVICE — QUILTED PREMIUM COMFORT UNDERPAD,EXTRA HEAVY: Brand: WINGS

## (undated) DEVICE — TRAP,MUCUS SPECIMEN, 80CC: Brand: MEDLINE

## (undated) DEVICE — TUBING, SUCTION, 3/16" X 6', STRAIGHT: Brand: MEDLINE

## (undated) DEVICE — STRAINER URIN CALC RNL MSH -- CONVERT TO ITEM 357634

## (undated) DEVICE — Z DISCONTINUED PER MEDLINE LINE GAS SAMPLING O2/CO2 LNG AD 13 FT NSL W/ TBNG FILTERLINE

## (undated) DEVICE — AIRLIFE™ ADULT CUSHION NASAL CANNULA 14 FOOT (4.3) CRUSH-RESISTANT OXYGEN TUBING, AND U/CONNECT-IT ADAPTER: Brand: AIRLIFE™

## (undated) DEVICE — SET ADMIN 16ML TBNG L100IN 2 Y INJ SITE IV PIGGY BK DISP (ORDER IN MULIPLES OF 48)

## (undated) DEVICE — SYRINGE MED 5ML STD CLR PLAS LUERLOCK TIP N CTRL DISP

## (undated) DEVICE — ELECTRODE,RADIOTRANSLUCENT,FOAM,5PK: Brand: MEDLINE

## (undated) DEVICE — NEONATAL-ADULT SPO2 SENSOR: Brand: NELLCOR